# Patient Record
Sex: FEMALE | Race: WHITE | NOT HISPANIC OR LATINO
[De-identification: names, ages, dates, MRNs, and addresses within clinical notes are randomized per-mention and may not be internally consistent; named-entity substitution may affect disease eponyms.]

---

## 2017-01-04 ENCOUNTER — FORM ENCOUNTER (OUTPATIENT)
Age: 77
End: 2017-01-04

## 2017-01-05 ENCOUNTER — OUTPATIENT (OUTPATIENT)
Dept: OUTPATIENT SERVICES | Facility: HOSPITAL | Age: 77
LOS: 1 days | End: 2017-01-05
Payer: MEDICARE

## 2017-01-05 ENCOUNTER — APPOINTMENT (OUTPATIENT)
Dept: RADIOLOGY | Facility: IMAGING CENTER | Age: 77
End: 2017-01-05

## 2017-01-05 ENCOUNTER — APPOINTMENT (OUTPATIENT)
Dept: MAMMOGRAPHY | Facility: IMAGING CENTER | Age: 77
End: 2017-01-05

## 2017-01-05 DIAGNOSIS — Z12.31 ENCOUNTER FOR SCREENING MAMMOGRAM FOR MALIGNANT NEOPLASM OF BREAST: ICD-10-CM

## 2017-01-05 DIAGNOSIS — M81.0 AGE-RELATED OSTEOPOROSIS WITHOUT CURRENT PATHOLOGICAL FRACTURE: ICD-10-CM

## 2017-01-05 PROCEDURE — 77080 DXA BONE DENSITY AXIAL: CPT

## 2017-01-05 PROCEDURE — 77067 SCR MAMMO BI INCL CAD: CPT

## 2017-01-09 DIAGNOSIS — Z78.0 ASYMPTOMATIC MENOPAUSAL STATE: ICD-10-CM

## 2017-01-09 DIAGNOSIS — Z12.31 ENCOUNTER FOR SCREENING MAMMOGRAM FOR MALIGNANT NEOPLASM OF BREAST: ICD-10-CM

## 2017-01-09 DIAGNOSIS — M81.0 AGE-RELATED OSTEOPOROSIS WITHOUT CURRENT PATHOLOGICAL FRACTURE: ICD-10-CM

## 2017-01-12 ENCOUNTER — OUTPATIENT (OUTPATIENT)
Dept: OUTPATIENT SERVICES | Facility: HOSPITAL | Age: 77
LOS: 1 days | Discharge: HOME | End: 2017-01-12

## 2017-01-12 DIAGNOSIS — D69.6 THROMBOCYTOPENIA, UNSPECIFIED: ICD-10-CM

## 2017-01-16 ENCOUNTER — RESULT REVIEW (OUTPATIENT)
Age: 77
End: 2017-01-16

## 2017-01-17 ENCOUNTER — APPOINTMENT (OUTPATIENT)
Dept: HEMATOLOGY ONCOLOGY | Facility: CLINIC | Age: 77
End: 2017-01-17

## 2017-01-17 VITALS
WEIGHT: 133.38 LBS | OXYGEN SATURATION: 99 % | DIASTOLIC BLOOD PRESSURE: 78 MMHG | SYSTOLIC BLOOD PRESSURE: 120 MMHG | BODY MASS INDEX: 27.62 KG/M2 | HEART RATE: 55 BPM | RESPIRATION RATE: 16 BRPM | TEMPERATURE: 98.3 F

## 2017-01-17 LAB
BASOPHILS # BLD AUTO: 0.1 K/UL — SIGNIFICANT CHANGE UP (ref 0–0.2)
BASOPHILS NFR BLD AUTO: 0.8 % — SIGNIFICANT CHANGE UP (ref 0–2)
EOSINOPHIL # BLD AUTO: 0.2 K/UL — SIGNIFICANT CHANGE UP (ref 0–0.5)
EOSINOPHIL NFR BLD AUTO: 3 % — SIGNIFICANT CHANGE UP (ref 0–6)
HCT VFR BLD CALC: 41 % — SIGNIFICANT CHANGE UP (ref 34.5–45)
HGB BLD-MCNC: 13.8 G/DL — SIGNIFICANT CHANGE UP (ref 11.5–15.5)
LYMPHOCYTES # BLD AUTO: 2.6 K/UL — SIGNIFICANT CHANGE UP (ref 1–3.3)
LYMPHOCYTES # BLD AUTO: 37.8 % — SIGNIFICANT CHANGE UP (ref 13–44)
MCHC RBC-ENTMCNC: 30.1 PG — SIGNIFICANT CHANGE UP (ref 27–34)
MCHC RBC-ENTMCNC: 33.6 GM/DL — SIGNIFICANT CHANGE UP (ref 32–36)
MCV RBC AUTO: 89.6 FL — SIGNIFICANT CHANGE UP (ref 80–100)
MONOCYTES # BLD AUTO: 0.6 K/UL — SIGNIFICANT CHANGE UP (ref 0–0.9)
MONOCYTES NFR BLD AUTO: 8.4 % — SIGNIFICANT CHANGE UP (ref 2–14)
NEUTROPHILS # BLD AUTO: 3.4 K/UL — SIGNIFICANT CHANGE UP (ref 1.8–7.4)
NEUTROPHILS NFR BLD AUTO: 50 % — SIGNIFICANT CHANGE UP (ref 43–77)
PLATELET # BLD AUTO: 101 K/UL — LOW (ref 150–400)
RBC # BLD: 4.58 M/UL — SIGNIFICANT CHANGE UP (ref 3.8–5.2)
RBC # FLD: 11.4 % — SIGNIFICANT CHANGE UP (ref 10.3–14.5)
WBC # BLD: 6.8 K/UL — SIGNIFICANT CHANGE UP (ref 3.8–10.5)
WBC # FLD AUTO: 6.8 K/UL — SIGNIFICANT CHANGE UP (ref 3.8–10.5)

## 2017-02-07 ENCOUNTER — APPOINTMENT (OUTPATIENT)
Dept: ORTHOPEDIC SURGERY | Facility: CLINIC | Age: 77
End: 2017-02-07

## 2017-02-07 VITALS — HEIGHT: 58 IN | BODY MASS INDEX: 27.92 KG/M2 | WEIGHT: 133 LBS

## 2017-02-07 VITALS — BODY MASS INDEX: 25.52 KG/M2 | WEIGHT: 130 LBS | HEIGHT: 60 IN

## 2017-02-16 ENCOUNTER — APPOINTMENT (OUTPATIENT)
Dept: ORTHOPEDIC SURGERY | Facility: CLINIC | Age: 77
End: 2017-02-16

## 2017-03-09 ENCOUNTER — MED ADMIN CHARGE (OUTPATIENT)
Age: 77
End: 2017-03-09

## 2017-03-09 ENCOUNTER — OUTPATIENT (OUTPATIENT)
Dept: OUTPATIENT SERVICES | Facility: HOSPITAL | Age: 77
LOS: 1 days | End: 2017-03-09
Payer: MEDICARE

## 2017-03-09 ENCOUNTER — APPOINTMENT (OUTPATIENT)
Dept: INTERNAL MEDICINE | Facility: CLINIC | Age: 77
End: 2017-03-09

## 2017-03-09 VITALS
SYSTOLIC BLOOD PRESSURE: 118 MMHG | BODY MASS INDEX: 25.91 KG/M2 | OXYGEN SATURATION: 96 % | HEART RATE: 60 BPM | DIASTOLIC BLOOD PRESSURE: 60 MMHG | WEIGHT: 132 LBS | HEIGHT: 60 IN

## 2017-03-09 DIAGNOSIS — M81.0 AGE-RELATED OSTEOPOROSIS WITHOUT CURRENT PATHOLOGICAL FRACTURE: ICD-10-CM

## 2017-03-09 DIAGNOSIS — E78.5 HYPERLIPIDEMIA, UNSPECIFIED: ICD-10-CM

## 2017-03-09 DIAGNOSIS — M19.012 PRIMARY OSTEOARTHRITIS, LEFT SHOULDER: ICD-10-CM

## 2017-03-09 DIAGNOSIS — I10 ESSENTIAL (PRIMARY) HYPERTENSION: ICD-10-CM

## 2017-03-09 DIAGNOSIS — Z00.00 ENCOUNTER FOR GENERAL ADULT MEDICAL EXAMINATION WITHOUT ABNORMAL FINDINGS: ICD-10-CM

## 2017-03-09 DIAGNOSIS — B35.1 TINEA UNGUIUM: ICD-10-CM

## 2017-03-09 DIAGNOSIS — M25.561 PAIN IN RIGHT KNEE: ICD-10-CM

## 2017-03-09 DIAGNOSIS — M17.11 UNILATERAL PRIMARY OSTEOARTHRITIS, RIGHT KNEE: ICD-10-CM

## 2017-03-09 DIAGNOSIS — D69.6 THROMBOCYTOPENIA, UNSPECIFIED: ICD-10-CM

## 2017-03-09 DIAGNOSIS — K21.9 GASTRO-ESOPHAGEAL REFLUX DISEASE WITHOUT ESOPHAGITIS: ICD-10-CM

## 2017-03-09 PROCEDURE — 90732 PPSV23 VACC 2 YRS+ SUBQ/IM: CPT

## 2017-03-09 PROCEDURE — 90715 TDAP VACCINE 7 YRS/> IM: CPT

## 2017-03-09 PROCEDURE — 90472 IMMUNIZATION ADMIN EACH ADD: CPT

## 2017-03-09 PROCEDURE — G0009: CPT

## 2017-03-09 PROCEDURE — G0463: CPT

## 2017-03-09 RX ORDER — CHLORHEXIDINE GLUCONATE 4 %
1000 LIQUID (ML) TOPICAL
Refills: 0 | Status: DISCONTINUED | COMMUNITY
Start: 2017-03-09 | End: 2017-03-09

## 2017-03-25 ENCOUNTER — EMERGENCY (EMERGENCY)
Facility: HOSPITAL | Age: 77
LOS: 1 days | Discharge: HOME | End: 2017-03-25
Attending: EMERGENCY MEDICINE | Admitting: EMERGENCY MEDICINE
Payer: MEDICARE

## 2017-03-25 VITALS
OXYGEN SATURATION: 100 % | HEART RATE: 57 BPM | TEMPERATURE: 98 F | SYSTOLIC BLOOD PRESSURE: 179 MMHG | RESPIRATION RATE: 16 BRPM | DIASTOLIC BLOOD PRESSURE: 72 MMHG

## 2017-03-25 VITALS — WEIGHT: 130.07 LBS | HEIGHT: 61 IN

## 2017-03-25 DIAGNOSIS — R10.13 EPIGASTRIC PAIN: ICD-10-CM

## 2017-03-25 LAB
ALBUMIN SERPL ELPH-MCNC: 3.5 G/DL — SIGNIFICANT CHANGE UP (ref 3.3–5)
ALP SERPL-CCNC: 73 U/L — SIGNIFICANT CHANGE UP (ref 40–120)
ALT FLD-CCNC: 20 U/L RC — SIGNIFICANT CHANGE UP (ref 10–45)
ANION GAP SERPL CALC-SCNC: 10 MMOL/L — SIGNIFICANT CHANGE UP (ref 5–17)
APPEARANCE UR: CLEAR — SIGNIFICANT CHANGE UP
AST SERPL-CCNC: 23 U/L — SIGNIFICANT CHANGE UP (ref 10–40)
BASOPHILS # BLD AUTO: 0 K/UL — SIGNIFICANT CHANGE UP (ref 0–0.2)
BASOPHILS NFR BLD AUTO: 0.8 % — SIGNIFICANT CHANGE UP (ref 0–2)
BILIRUB SERPL-MCNC: 0.4 MG/DL — SIGNIFICANT CHANGE UP (ref 0.2–1.2)
BILIRUB UR-MCNC: NEGATIVE — SIGNIFICANT CHANGE UP
BUN SERPL-MCNC: 13 MG/DL — SIGNIFICANT CHANGE UP (ref 7–23)
CALCIUM SERPL-MCNC: 8.6 MG/DL — SIGNIFICANT CHANGE UP (ref 8.4–10.5)
CHLORIDE SERPL-SCNC: 104 MMOL/L — SIGNIFICANT CHANGE UP (ref 96–108)
CO2 SERPL-SCNC: 28 MMOL/L — SIGNIFICANT CHANGE UP (ref 22–31)
COLOR SPEC: COLORLESS — SIGNIFICANT CHANGE UP
CREAT SERPL-MCNC: 0.72 MG/DL — SIGNIFICANT CHANGE UP (ref 0.5–1.3)
DIFF PNL FLD: NEGATIVE — SIGNIFICANT CHANGE UP
EOSINOPHIL # BLD AUTO: 0.2 K/UL — SIGNIFICANT CHANGE UP (ref 0–0.5)
EOSINOPHIL NFR BLD AUTO: 2.7 % — SIGNIFICANT CHANGE UP (ref 0–6)
EPI CELLS # UR: SIGNIFICANT CHANGE UP /HPF
GLUCOSE SERPL-MCNC: 119 MG/DL — HIGH (ref 70–99)
GLUCOSE UR QL: NEGATIVE — SIGNIFICANT CHANGE UP
HCT VFR BLD CALC: 39.8 % — SIGNIFICANT CHANGE UP (ref 34.5–45)
HGB BLD-MCNC: 13.4 G/DL — SIGNIFICANT CHANGE UP (ref 11.5–15.5)
KETONES UR-MCNC: NEGATIVE — SIGNIFICANT CHANGE UP
LEUKOCYTE ESTERASE UR-ACNC: ABNORMAL
LIDOCAIN IGE QN: 41 U/L — SIGNIFICANT CHANGE UP (ref 7–60)
LYMPHOCYTES # BLD AUTO: 2.3 K/UL — SIGNIFICANT CHANGE UP (ref 1–3.3)
LYMPHOCYTES # BLD AUTO: 37.1 % — SIGNIFICANT CHANGE UP (ref 13–44)
MCHC RBC-ENTMCNC: 30 PG — SIGNIFICANT CHANGE UP (ref 27–34)
MCHC RBC-ENTMCNC: 33.6 GM/DL — SIGNIFICANT CHANGE UP (ref 32–36)
MCV RBC AUTO: 89.5 FL — SIGNIFICANT CHANGE UP (ref 80–100)
MONOCYTES # BLD AUTO: 0.7 K/UL — SIGNIFICANT CHANGE UP (ref 0–0.9)
MONOCYTES NFR BLD AUTO: 10.8 % — SIGNIFICANT CHANGE UP (ref 2–14)
NEUTROPHILS # BLD AUTO: 3.1 K/UL — SIGNIFICANT CHANGE UP (ref 1.8–7.4)
NEUTROPHILS NFR BLD AUTO: 48.6 % — SIGNIFICANT CHANGE UP (ref 43–77)
NITRITE UR-MCNC: NEGATIVE — SIGNIFICANT CHANGE UP
PH UR: 6 — SIGNIFICANT CHANGE UP (ref 4.8–8)
PLATELET # BLD AUTO: 125 K/UL — LOW (ref 150–400)
POTASSIUM SERPL-MCNC: 4.5 MMOL/L — SIGNIFICANT CHANGE UP (ref 3.5–5.3)
POTASSIUM SERPL-SCNC: 4.5 MMOL/L — SIGNIFICANT CHANGE UP (ref 3.5–5.3)
PROT SERPL-MCNC: 6.4 G/DL — SIGNIFICANT CHANGE UP (ref 6–8.3)
PROT UR-MCNC: NEGATIVE — SIGNIFICANT CHANGE UP
RBC # BLD: 4.45 M/UL — SIGNIFICANT CHANGE UP (ref 3.8–5.2)
RBC # FLD: 11.6 % — SIGNIFICANT CHANGE UP (ref 10.3–14.5)
RBC CASTS # UR COMP ASSIST: SIGNIFICANT CHANGE UP /HPF (ref 0–2)
SODIUM SERPL-SCNC: 142 MMOL/L — SIGNIFICANT CHANGE UP (ref 135–145)
SP GR SPEC: 1 — LOW (ref 1.01–1.02)
UROBILINOGEN FLD QL: NEGATIVE — SIGNIFICANT CHANGE UP
WBC # BLD: 6.3 K/UL — SIGNIFICANT CHANGE UP (ref 3.8–10.5)
WBC # FLD AUTO: 6.3 K/UL — SIGNIFICANT CHANGE UP (ref 3.8–10.5)
WBC UR QL: SIGNIFICANT CHANGE UP /HPF (ref 0–5)

## 2017-03-25 PROCEDURE — 93010 ELECTROCARDIOGRAM REPORT: CPT

## 2017-03-25 PROCEDURE — 83690 ASSAY OF LIPASE: CPT

## 2017-03-25 PROCEDURE — 93005 ELECTROCARDIOGRAM TRACING: CPT

## 2017-03-25 PROCEDURE — 80053 COMPREHEN METABOLIC PANEL: CPT

## 2017-03-25 PROCEDURE — 96375 TX/PRO/DX INJ NEW DRUG ADDON: CPT

## 2017-03-25 PROCEDURE — 85027 COMPLETE CBC AUTOMATED: CPT

## 2017-03-25 PROCEDURE — 99284 EMERGENCY DEPT VISIT MOD MDM: CPT | Mod: 25

## 2017-03-25 PROCEDURE — 71045 X-RAY EXAM CHEST 1 VIEW: CPT

## 2017-03-25 PROCEDURE — 81001 URINALYSIS AUTO W/SCOPE: CPT

## 2017-03-25 PROCEDURE — 99284 EMERGENCY DEPT VISIT MOD MDM: CPT | Mod: 25,GC

## 2017-03-25 PROCEDURE — 71010: CPT | Mod: 26

## 2017-03-25 PROCEDURE — 96374 THER/PROPH/DIAG INJ IV PUSH: CPT

## 2017-03-25 RX ORDER — LIDOCAINE 4 G/100G
10 CREAM TOPICAL ONCE
Qty: 0 | Refills: 0 | Status: COMPLETED | OUTPATIENT
Start: 2017-03-25 | End: 2017-03-25

## 2017-03-25 RX ORDER — FAMOTIDINE 10 MG/ML
20 INJECTION INTRAVENOUS ONCE
Qty: 0 | Refills: 0 | Status: COMPLETED | OUTPATIENT
Start: 2017-03-25 | End: 2017-03-25

## 2017-03-25 RX ORDER — ACETAMINOPHEN 500 MG
1000 TABLET ORAL ONCE
Qty: 0 | Refills: 0 | Status: COMPLETED | OUTPATIENT
Start: 2017-03-25 | End: 2017-03-25

## 2017-03-25 RX ADMIN — FAMOTIDINE 20 MILLIGRAM(S): 10 INJECTION INTRAVENOUS at 12:16

## 2017-03-25 RX ADMIN — LIDOCAINE 10 MILLILITER(S): 4 CREAM TOPICAL at 12:16

## 2017-03-25 RX ADMIN — Medication 400 MILLIGRAM(S): at 12:16

## 2017-03-25 RX ADMIN — Medication 30 MILLILITER(S): at 12:16

## 2017-03-25 NOTE — ED PROVIDER NOTE - CARE PLAN
Instructions for follow-up, activity and diet:	Take Maalox 10 to 20 mL 4 times daily (maximum: 80 mL per 24 hours) as need for stomach upset. Continue taking your famotidine as prescribed. Please follow-up with your PMD within 1-2 days following discharge. GI referral provided. Continue activity and diet as tolerated. If you becomes unable to tolerate liquids by mouth, uncontrollable pain, have new or worsening symptoms, or any other concern please return to the ED. Principal Discharge DX:	GERD (gastroesophageal reflux disease)  Instructions for follow-up, activity and diet:	Follow up with Cardiology: see referral sheet for rapid cardiology appointment. Take Maalox 10 to 20 mL 4 times daily (maximum: 80 mL per 24 hours) as need for stomach upset. Continue taking your famotidine as prescribed. Please follow-up with your PMD within 1-2 days following discharge. GI referral provided. Continue activity and diet as tolerated. If you becomes unable to tolerate liquids by mouth, uncontrollable pain, have new or worsening symptoms, or any other concern please return to the ED.

## 2017-03-25 NOTE — ED PROVIDER NOTE - PLAN OF CARE
Take Maalox 10 to 20 mL 4 times daily (maximum: 80 mL per 24 hours) as need for stomach upset. Continue taking your famotidine as prescribed. Please follow-up with your PMD within 1-2 days following discharge. GI referral provided. Continue activity and diet as tolerated. If you becomes unable to tolerate liquids by mouth, uncontrollable pain, have new or worsening symptoms, or any other concern please return to the ED. Follow up with Cardiology: see referral sheet for rapid cardiology appointment. Take Maalox 10 to 20 mL 4 times daily (maximum: 80 mL per 24 hours) as need for stomach upset. Continue taking your famotidine as prescribed. Please follow-up with your PMD within 1-2 days following discharge. GI referral provided. Continue activity and diet as tolerated. If you becomes unable to tolerate liquids by mouth, uncontrollable pain, have new or worsening symptoms, or any other concern please return to the ED.

## 2017-03-25 NOTE — ED PROVIDER NOTE - PMH
Arthritis    GERD (gastroesophageal reflux disease)    HTN (hypertension)    Hypercholesteremia    Thrombocytopenia

## 2017-03-25 NOTE — ED PROVIDER NOTE - NS ED ROS FT
No fever/chills, no change in vision, no dysphagia, no odynophagia, no chest pain, + abdominal pain see hpi, no nausea/vomiting,  no dysuria, no joint pain, no rashes, no focal neurologic complaints, no known mental health issues

## 2017-03-25 NOTE — ED PROVIDER NOTE - MEDICAL DECISION MAKING DETAILS
epigatric and esophageal burning after stopping PPI - signs and symptoms concerning for GERD - labs, gi cocktail, ekg, xr, reassess.

## 2017-03-25 NOTE — ED ADULT NURSE NOTE - OBJECTIVE STATEMENT
Pt presents to the ER A+Ox3 complaining of constant epigastric burning radiating upwards into throat and nose, nausea x4 days. Pt has hx of GERD recently changed PCP, currently goes to clinic, where they changed her Protonix prescription to Pepcid. 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

## 2017-03-25 NOTE — ED PROVIDER NOTE - PHYSICAL EXAMINATION
AAOX3, NAD, NCAT, EOMI, PERRL, MMM, Neck Supple, RRR, CTABL, ABD S/suprapubic ttp, without rebound or guarding, ND +BS, No CVAT, EXT warm, well perfused, No Edema, Distal Pulses Intact, No Rash,  MAEx4, Sensation to soft touch grossly intact, normal strength/tone.

## 2017-03-25 NOTE — ED PROVIDER NOTE - ATTENDING CONTRIBUTION TO CARE
Attending MD Stewart: I personally have seen and examined this patient.  Resident note reviewed and agree on plan of care and except where noted.  See below for details.    77F with PMH including HTN, HLD, GERD, arthritis presents to the ED with epigastric burning for 4 days.  Reports that PMD (at [address]) switched Rx from Protonix to Famotidine 4 days ago.  Reports that since then had had increased epigastric burning worse with laying down or after eating.  Reports that this AM took Protonix and reports that pain is improved since taking the Protonix.  Reports symptoms also associated with increased eructation.  Denies chest pain, shortness of breath, palpitations. Denies vomiting, diarrhea, blood in stools. Denies dysuria, hematuria, change in urinary habits including frequency, urgency. Denies fevers, chills, dizziness, weakness, change in vision. On exam, head NCAT, PERRL, FROM at neck, no tenderness to palpation or stepoffs along length of spine, lungs CTAB with good inspiratory effort, +S1S2, no m/r/g, abdomen soft with +BS, +mild tenderness to palpation over epigastrum, ND, no CVAT, moving all extremities with 5/5 strength bilateral upper and lower extremities, good and equal  strength bilaterally; Plan: pain control, GI cocktail, labs, UA, EKG, CXR, reassess Attending MD Stewart: I personally have seen and examined this patient.  Resident note reviewed and agree on plan of care and except where noted.  See below for details.    77F with PMH including HTN, HLD, GERD, arthritis presents to the ED with epigastric burning for 4 days.  Reports that PMD (at [address]) switched Rx from Protonix to Famotidine 4 days ago.  Reports that since then had had increased epigastric burning worse with laying down or after eating.  Reports that this AM took Protonix and reports that pain is improved since taking the Protonix.  Reports symptoms also associated with increased eructation.  Denies PSH, EtOH, tobacco, drugs, allergies.  Denies chest pain, shortness of breath, palpitations. Denies vomiting, diarrhea, blood in stools. Denies dysuria, hematuria, change in urinary habits including frequency, urgency. Denies fevers, chills, dizziness, weakness, change in vision. On exam, head NCAT, PERRL, FROM at neck, no tenderness to palpation or stepoffs along length of spine, lungs CTAB with good inspiratory effort, +S1S2, no m/r/g, abdomen soft with +BS, +mild tenderness to palpation over epigastrum, ND, no CVAT, moving all extremities with 5/5 strength bilateral upper and lower extremities, good and equal  strength bilaterally; Plan: pain control, GI cocktail, labs, UA, EKG, CXR, reassess

## 2017-04-13 ENCOUNTER — APPOINTMENT (OUTPATIENT)
Dept: OTOLARYNGOLOGY | Facility: CLINIC | Age: 77
End: 2017-04-13

## 2017-04-13 VITALS
WEIGHT: 132 LBS | HEIGHT: 60 IN | BODY MASS INDEX: 25.91 KG/M2 | DIASTOLIC BLOOD PRESSURE: 82 MMHG | SYSTOLIC BLOOD PRESSURE: 138 MMHG

## 2017-04-13 DIAGNOSIS — L29.9 PRURITUS, UNSPECIFIED: ICD-10-CM

## 2017-04-25 ENCOUNTER — APPOINTMENT (OUTPATIENT)
Dept: GASTROENTEROLOGY | Facility: CLINIC | Age: 77
End: 2017-04-25

## 2017-05-03 ENCOUNTER — LABORATORY RESULT (OUTPATIENT)
Age: 77
End: 2017-05-03

## 2017-05-03 ENCOUNTER — APPOINTMENT (OUTPATIENT)
Dept: RHEUMATOLOGY | Facility: CLINIC | Age: 77
End: 2017-05-03

## 2017-05-03 VITALS
BODY MASS INDEX: 26.9 KG/M2 | HEIGHT: 60 IN | DIASTOLIC BLOOD PRESSURE: 74 MMHG | SYSTOLIC BLOOD PRESSURE: 139 MMHG | HEART RATE: 62 BPM | OXYGEN SATURATION: 97 % | WEIGHT: 137 LBS

## 2017-05-08 LAB
25(OH)D3 SERPL-MCNC: 39.9 NG/ML
ALBUMIN SERPL ELPH-MCNC: 3.9 G/DL
ALP BLD-CCNC: 80 U/L
ALT SERPL-CCNC: 20 U/L
ANION GAP SERPL CALC-SCNC: 11 MMOL/L
AST SERPL-CCNC: 25 U/L
BASOPHILS # BLD AUTO: 0.05 K/UL
BASOPHILS NFR BLD AUTO: 0.9 %
BILIRUB SERPL-MCNC: 0.3 MG/DL
BUN SERPL-MCNC: 16 MG/DL
CALCIUM SERPL-MCNC: 9.3 MG/DL
CALCIUM SERPL-MCNC: 9.3 MG/DL
CHLORIDE SERPL-SCNC: 103 MMOL/L
CO2 SERPL-SCNC: 28 MMOL/L
CREAT SERPL-MCNC: 0.86 MG/DL
CRP SERPL-MCNC: 0.29 MG/DL
EOSINOPHIL # BLD AUTO: 0.15 K/UL
EOSINOPHIL NFR BLD AUTO: 2.7 %
ERYTHROCYTE [SEDIMENTATION RATE] IN BLOOD BY WESTERGREN METHOD: 10 MM/HR
GLUCOSE SERPL-MCNC: 152 MG/DL
HCT VFR BLD CALC: 38.9 %
HGB BLD-MCNC: 12.8 G/DL
IMM GRANULOCYTES NFR BLD AUTO: 0.2 %
LYMPHOCYTES # BLD AUTO: 2.57 K/UL
LYMPHOCYTES NFR BLD AUTO: 45.6 %
MAN DIFF?: NORMAL
MCHC RBC-ENTMCNC: 29.2 PG
MCHC RBC-ENTMCNC: 32.9 GM/DL
MCV RBC AUTO: 88.6 FL
MONOCYTES # BLD AUTO: 0.37 K/UL
MONOCYTES NFR BLD AUTO: 6.6 %
NEUTROPHILS # BLD AUTO: 2.48 K/UL
NEUTROPHILS NFR BLD AUTO: 44 %
PARATHYROID HORMONE INTACT: 48 PG/ML
PLATELET # BLD AUTO: 141 K/UL
POTASSIUM SERPL-SCNC: 4.2 MMOL/L
PROT SERPL-MCNC: 6.5 G/DL
RBC # BLD: 4.39 M/UL
RBC # FLD: 13 %
SODIUM SERPL-SCNC: 142 MMOL/L
TSH SERPL-ACNC: 3.8 UIU/ML
WBC # FLD AUTO: 5.63 K/UL

## 2017-05-09 ENCOUNTER — APPOINTMENT (OUTPATIENT)
Dept: GASTROENTEROLOGY | Facility: CLINIC | Age: 77
End: 2017-05-09

## 2017-05-09 ENCOUNTER — RESULT REVIEW (OUTPATIENT)
Age: 77
End: 2017-05-09

## 2017-05-10 ENCOUNTER — APPOINTMENT (OUTPATIENT)
Dept: ORTHOPEDIC SURGERY | Facility: CLINIC | Age: 77
End: 2017-05-10

## 2017-05-18 ENCOUNTER — APPOINTMENT (OUTPATIENT)
Dept: INTERNAL MEDICINE | Facility: CLINIC | Age: 77
End: 2017-05-18

## 2017-05-18 ENCOUNTER — OUTPATIENT (OUTPATIENT)
Dept: OUTPATIENT SERVICES | Facility: HOSPITAL | Age: 77
LOS: 1 days | End: 2017-05-18
Payer: MEDICARE

## 2017-05-18 VITALS
HEART RATE: 63 BPM | BODY MASS INDEX: 26.56 KG/M2 | OXYGEN SATURATION: 96 % | DIASTOLIC BLOOD PRESSURE: 70 MMHG | SYSTOLIC BLOOD PRESSURE: 125 MMHG | WEIGHT: 136 LBS

## 2017-05-18 DIAGNOSIS — K21.9 GASTRO-ESOPHAGEAL REFLUX DISEASE WITHOUT ESOPHAGITIS: ICD-10-CM

## 2017-05-18 DIAGNOSIS — B36.9 SUPERFICIAL MYCOSIS, UNSPECIFIED: ICD-10-CM

## 2017-05-18 DIAGNOSIS — I10 ESSENTIAL (PRIMARY) HYPERTENSION: ICD-10-CM

## 2017-05-18 DIAGNOSIS — M81.0 AGE-RELATED OSTEOPOROSIS WITHOUT CURRENT PATHOLOGICAL FRACTURE: ICD-10-CM

## 2017-05-18 PROCEDURE — G0463: CPT

## 2017-05-19 ENCOUNTER — OTHER (OUTPATIENT)
Age: 77
End: 2017-05-19

## 2017-05-19 RX ORDER — CLOTRIMAZOLE AND BETAMETHASONE DIPROPIONATE 10; .5 MG/G; MG/G
1-0.05 CREAM TOPICAL
Qty: 1 | Refills: 0 | Status: DISCONTINUED | COMMUNITY
Start: 2017-05-18 | End: 2017-05-19

## 2017-05-25 ENCOUNTER — APPOINTMENT (OUTPATIENT)
Dept: GASTROENTEROLOGY | Facility: CLINIC | Age: 77
End: 2017-05-25

## 2017-06-01 ENCOUNTER — MEDICATION RENEWAL (OUTPATIENT)
Age: 77
End: 2017-06-01

## 2017-07-26 ENCOUNTER — OUTPATIENT (OUTPATIENT)
Dept: OUTPATIENT SERVICES | Facility: HOSPITAL | Age: 77
LOS: 1 days | End: 2017-07-26
Payer: MEDICARE

## 2017-07-26 ENCOUNTER — APPOINTMENT (OUTPATIENT)
Dept: INTERNAL MEDICINE | Facility: CLINIC | Age: 77
End: 2017-07-26

## 2017-07-26 VITALS
DIASTOLIC BLOOD PRESSURE: 68 MMHG | HEART RATE: 66 BPM | RESPIRATION RATE: 14 BRPM | OXYGEN SATURATION: 95 % | SYSTOLIC BLOOD PRESSURE: 144 MMHG

## 2017-07-26 VITALS — WEIGHT: 130 LBS | BODY MASS INDEX: 25.39 KG/M2

## 2017-07-26 DIAGNOSIS — B35.1 TINEA UNGUIUM: ICD-10-CM

## 2017-07-26 DIAGNOSIS — D69.6 THROMBOCYTOPENIA, UNSPECIFIED: ICD-10-CM

## 2017-07-26 DIAGNOSIS — I10 ESSENTIAL (PRIMARY) HYPERTENSION: ICD-10-CM

## 2017-07-26 DIAGNOSIS — M81.0 AGE-RELATED OSTEOPOROSIS WITHOUT CURRENT PATHOLOGICAL FRACTURE: ICD-10-CM

## 2017-07-26 DIAGNOSIS — K21.9 GASTRO-ESOPHAGEAL REFLUX DISEASE WITHOUT ESOPHAGITIS: ICD-10-CM

## 2017-07-26 PROCEDURE — G0463: CPT

## 2017-07-26 RX ORDER — CLOTRIMAZOLE 10 MG/G
1 CREAM TOPICAL
Qty: 1 | Refills: 0 | Status: COMPLETED | COMMUNITY
Start: 2017-05-19 | End: 2017-07-26

## 2017-07-26 RX ORDER — BETAMETHASONE DIPROPIONATE 0.5 MG/G
0.05 CREAM TOPICAL TWICE DAILY
Qty: 1 | Refills: 0 | Status: COMPLETED | COMMUNITY
Start: 2017-05-19 | End: 2017-07-26

## 2017-08-15 ENCOUNTER — APPOINTMENT (OUTPATIENT)
Dept: ORTHOPEDIC SURGERY | Facility: CLINIC | Age: 77
End: 2017-08-15

## 2017-09-29 ENCOUNTER — APPOINTMENT (OUTPATIENT)
Dept: ENDOCRINOLOGY | Facility: CLINIC | Age: 77
End: 2017-09-29
Payer: MEDICARE

## 2017-09-29 VITALS
WEIGHT: 125 LBS | OXYGEN SATURATION: 98 % | DIASTOLIC BLOOD PRESSURE: 78 MMHG | BODY MASS INDEX: 24.54 KG/M2 | HEIGHT: 60 IN | SYSTOLIC BLOOD PRESSURE: 130 MMHG | HEART RATE: 75 BPM

## 2017-09-29 PROCEDURE — 99204 OFFICE O/P NEW MOD 45 MIN: CPT

## 2017-09-29 RX ORDER — ACETAMINOPHEN AND PHENYLEPHRINE HYDROCHLORIDE 325; 5 MG/1; MG/1
TABLET, COATED ORAL
Refills: 0 | Status: ACTIVE | COMMUNITY

## 2017-10-04 ENCOUNTER — RX RENEWAL (OUTPATIENT)
Age: 77
End: 2017-10-04

## 2017-10-04 ENCOUNTER — APPOINTMENT (OUTPATIENT)
Dept: INTERNAL MEDICINE | Facility: CLINIC | Age: 77
End: 2017-10-04

## 2017-10-04 RX ORDER — RISEDRONATE SODIUM 150 MG/1
150 TABLET, FILM COATED ORAL
Qty: 3 | Refills: 3 | Status: DISCONTINUED | COMMUNITY
Start: 2017-09-29 | End: 2017-10-04

## 2017-10-16 ENCOUNTER — OUTPATIENT (OUTPATIENT)
Dept: OUTPATIENT SERVICES | Facility: HOSPITAL | Age: 77
LOS: 1 days | End: 2017-10-16
Payer: MEDICARE

## 2017-10-16 ENCOUNTER — APPOINTMENT (OUTPATIENT)
Dept: INTERNAL MEDICINE | Facility: CLINIC | Age: 77
End: 2017-10-16
Payer: MEDICARE

## 2017-10-16 ENCOUNTER — NON-APPOINTMENT (OUTPATIENT)
Age: 77
End: 2017-10-16

## 2017-10-16 VITALS
BODY MASS INDEX: 24.54 KG/M2 | HEART RATE: 55 BPM | DIASTOLIC BLOOD PRESSURE: 64 MMHG | SYSTOLIC BLOOD PRESSURE: 118 MMHG | WEIGHT: 125 LBS | HEIGHT: 60 IN | OXYGEN SATURATION: 97 %

## 2017-10-16 DIAGNOSIS — I10 ESSENTIAL (PRIMARY) HYPERTENSION: ICD-10-CM

## 2017-10-16 DIAGNOSIS — H25.89 OTHER AGE-RELATED CATARACT: ICD-10-CM

## 2017-10-16 DIAGNOSIS — Z01.818 ENCOUNTER FOR OTHER PREPROCEDURAL EXAMINATION: ICD-10-CM

## 2017-10-16 LAB — HBA1C MFR BLD HPLC: 5.6

## 2017-10-16 PROCEDURE — G0463: CPT

## 2017-10-16 PROCEDURE — 99214 OFFICE O/P EST MOD 30 MIN: CPT | Mod: GC

## 2017-10-16 PROCEDURE — 93005 ELECTROCARDIOGRAM TRACING: CPT

## 2017-11-21 ENCOUNTER — APPOINTMENT (OUTPATIENT)
Dept: INTERNAL MEDICINE | Facility: CLINIC | Age: 77
End: 2017-11-21

## 2017-12-12 ENCOUNTER — OUTPATIENT (OUTPATIENT)
Dept: OUTPATIENT SERVICES | Facility: HOSPITAL | Age: 77
LOS: 1 days | End: 2017-12-12
Payer: MEDICARE

## 2017-12-12 ENCOUNTER — MED ADMIN CHARGE (OUTPATIENT)
Age: 77
End: 2017-12-12

## 2017-12-12 ENCOUNTER — APPOINTMENT (OUTPATIENT)
Dept: INTERNAL MEDICINE | Facility: CLINIC | Age: 77
End: 2017-12-12

## 2017-12-12 DIAGNOSIS — Z01.818 ENCOUNTER FOR OTHER PREPROCEDURAL EXAMINATION: ICD-10-CM

## 2017-12-12 DIAGNOSIS — Z23 ENCOUNTER FOR IMMUNIZATION: ICD-10-CM

## 2017-12-12 DIAGNOSIS — H25.89 OTHER AGE-RELATED CATARACT: ICD-10-CM

## 2017-12-12 PROCEDURE — 90732 PPSV23 VACC 2 YRS+ SUBQ/IM: CPT

## 2017-12-12 PROCEDURE — G0009: CPT

## 2018-01-10 ENCOUNTER — APPOINTMENT (OUTPATIENT)
Dept: ENDOCRINOLOGY | Facility: CLINIC | Age: 78
End: 2018-01-10
Payer: MEDICARE

## 2018-01-10 VITALS
WEIGHT: 129 LBS | BODY MASS INDEX: 25.32 KG/M2 | SYSTOLIC BLOOD PRESSURE: 132 MMHG | HEART RATE: 67 BPM | DIASTOLIC BLOOD PRESSURE: 64 MMHG | HEIGHT: 60 IN | OXYGEN SATURATION: 98 %

## 2018-01-10 PROCEDURE — 99214 OFFICE O/P EST MOD 30 MIN: CPT

## 2018-01-22 ENCOUNTER — OUTPATIENT (OUTPATIENT)
Dept: OUTPATIENT SERVICES | Facility: HOSPITAL | Age: 78
LOS: 1 days | End: 2018-01-22
Payer: MEDICARE

## 2018-01-22 ENCOUNTER — APPOINTMENT (OUTPATIENT)
Dept: INTERNAL MEDICINE | Facility: CLINIC | Age: 78
End: 2018-01-22
Payer: MEDICARE

## 2018-01-22 VITALS
DIASTOLIC BLOOD PRESSURE: 60 MMHG | SYSTOLIC BLOOD PRESSURE: 140 MMHG | BODY MASS INDEX: 26.79 KG/M2 | OXYGEN SATURATION: 96 % | HEART RATE: 73 BPM | TEMPERATURE: 99.9 F | WEIGHT: 126 LBS

## 2018-01-22 DIAGNOSIS — J06.9 ACUTE UPPER RESPIRATORY INFECTION, UNSPECIFIED: ICD-10-CM

## 2018-01-22 DIAGNOSIS — E78.5 HYPERLIPIDEMIA, UNSPECIFIED: ICD-10-CM

## 2018-01-22 DIAGNOSIS — R30.0 DYSURIA: ICD-10-CM

## 2018-01-22 DIAGNOSIS — I10 ESSENTIAL (PRIMARY) HYPERTENSION: ICD-10-CM

## 2018-01-22 PROCEDURE — 99214 OFFICE O/P EST MOD 30 MIN: CPT | Mod: GC

## 2018-01-22 PROCEDURE — G0463: CPT

## 2018-01-24 LAB — BACTERIA UR CULT: NORMAL

## 2018-01-29 ENCOUNTER — APPOINTMENT (OUTPATIENT)
Dept: INTERNAL MEDICINE | Facility: CLINIC | Age: 78
End: 2018-01-29
Payer: MEDICARE

## 2018-01-29 VITALS
DIASTOLIC BLOOD PRESSURE: 60 MMHG | WEIGHT: 124 LBS | OXYGEN SATURATION: 97 % | HEIGHT: 57.5 IN | SYSTOLIC BLOOD PRESSURE: 144 MMHG | BODY MASS INDEX: 26.39 KG/M2 | HEART RATE: 71 BPM

## 2018-01-29 DIAGNOSIS — R09.81 NASAL CONGESTION: ICD-10-CM

## 2018-01-29 DIAGNOSIS — J34.89 NASAL CONGESTION: ICD-10-CM

## 2018-01-29 PROCEDURE — 99213 OFFICE O/P EST LOW 20 MIN: CPT | Mod: GE

## 2018-02-25 ENCOUNTER — FORM ENCOUNTER (OUTPATIENT)
Age: 78
End: 2018-02-25

## 2018-02-26 ENCOUNTER — OUTPATIENT (OUTPATIENT)
Dept: OUTPATIENT SERVICES | Facility: HOSPITAL | Age: 78
LOS: 1 days | End: 2018-02-26
Payer: MEDICARE

## 2018-02-26 ENCOUNTER — APPOINTMENT (OUTPATIENT)
Dept: RADIOLOGY | Facility: IMAGING CENTER | Age: 78
End: 2018-02-26
Payer: MEDICARE

## 2018-02-26 ENCOUNTER — APPOINTMENT (OUTPATIENT)
Dept: MAMMOGRAPHY | Facility: IMAGING CENTER | Age: 78
End: 2018-02-26
Payer: MEDICARE

## 2018-02-26 DIAGNOSIS — R06.02 SHORTNESS OF BREATH: ICD-10-CM

## 2018-02-26 PROCEDURE — 71046 X-RAY EXAM CHEST 2 VIEWS: CPT

## 2018-02-26 PROCEDURE — 77067 SCR MAMMO BI INCL CAD: CPT

## 2018-02-26 PROCEDURE — 77063 BREAST TOMOSYNTHESIS BI: CPT

## 2018-02-26 PROCEDURE — 77063 BREAST TOMOSYNTHESIS BI: CPT | Mod: 26

## 2018-02-26 PROCEDURE — 71046 X-RAY EXAM CHEST 2 VIEWS: CPT | Mod: 26

## 2018-02-26 PROCEDURE — 77067 SCR MAMMO BI INCL CAD: CPT | Mod: 26

## 2018-02-27 ENCOUNTER — MEDICATION RENEWAL (OUTPATIENT)
Age: 78
End: 2018-02-27

## 2018-02-28 ENCOUNTER — RX RENEWAL (OUTPATIENT)
Age: 78
End: 2018-02-28

## 2018-03-01 ENCOUNTER — RX RENEWAL (OUTPATIENT)
Age: 78
End: 2018-03-01

## 2018-03-01 RX ORDER — ALENDRONATE SODIUM 70 MG/1
70 TABLET ORAL
Qty: 13 | Refills: 3 | Status: DISCONTINUED | COMMUNITY
Start: 2017-10-04 | End: 2018-03-01

## 2018-04-09 ENCOUNTER — APPOINTMENT (OUTPATIENT)
Dept: INTERNAL MEDICINE | Facility: CLINIC | Age: 78
End: 2018-04-09

## 2018-04-25 ENCOUNTER — APPOINTMENT (OUTPATIENT)
Dept: OTOLARYNGOLOGY | Facility: CLINIC | Age: 78
End: 2018-04-25
Payer: MEDICARE

## 2018-04-25 VITALS
HEIGHT: 57.5 IN | WEIGHT: 125 LBS | HEART RATE: 60 BPM | DIASTOLIC BLOOD PRESSURE: 69 MMHG | BODY MASS INDEX: 26.6 KG/M2 | SYSTOLIC BLOOD PRESSURE: 162 MMHG

## 2018-04-25 DIAGNOSIS — H90.3 SENSORINEURAL HEARING LOSS, BILATERAL: ICD-10-CM

## 2018-04-25 PROCEDURE — 99213 OFFICE O/P EST LOW 20 MIN: CPT

## 2018-04-25 PROCEDURE — 92567 TYMPANOMETRY: CPT

## 2018-04-25 PROCEDURE — 92557 COMPREHENSIVE HEARING TEST: CPT

## 2018-04-25 RX ORDER — GLUCOSAMINE SULFATE 500 MG
500 CAPSULE ORAL
Refills: 0 | Status: DISCONTINUED | COMMUNITY
Start: 2017-03-09 | End: 2018-04-25

## 2018-04-25 RX ORDER — FLUTICASONE PROPIONATE 50 UG/1
50 SPRAY, METERED NASAL
Qty: 1 | Refills: 0 | Status: DISCONTINUED | COMMUNITY
Start: 2018-01-29 | End: 2018-04-25

## 2018-04-25 RX ORDER — UBIDECARENONE/VIT E ACET 100MG-5
100 CAPSULE ORAL TWICE DAILY
Refills: 0 | Status: DISCONTINUED | COMMUNITY
Start: 2017-03-09 | End: 2018-04-25

## 2018-05-11 PROBLEM — H90.3 COCHLEAR HEARING LOSS, BILATERAL: Status: ACTIVE | Noted: 2017-04-28

## 2018-06-26 ENCOUNTER — APPOINTMENT (OUTPATIENT)
Dept: CARDIOLOGY | Facility: CLINIC | Age: 78
End: 2018-06-26
Payer: MEDICARE

## 2018-06-26 ENCOUNTER — NON-APPOINTMENT (OUTPATIENT)
Age: 78
End: 2018-06-26

## 2018-06-26 VITALS
OXYGEN SATURATION: 98 % | HEIGHT: 57.5 IN | HEART RATE: 59 BPM | DIASTOLIC BLOOD PRESSURE: 70 MMHG | SYSTOLIC BLOOD PRESSURE: 140 MMHG | BODY MASS INDEX: 26.81 KG/M2 | WEIGHT: 126 LBS

## 2018-06-26 PROCEDURE — 93000 ELECTROCARDIOGRAM COMPLETE: CPT

## 2018-06-26 PROCEDURE — 99205 OFFICE O/P NEW HI 60 MIN: CPT

## 2018-07-30 ENCOUNTER — APPOINTMENT (OUTPATIENT)
Dept: ENDOCRINOLOGY | Facility: CLINIC | Age: 78
End: 2018-07-30
Payer: MEDICARE

## 2018-07-30 VITALS
SYSTOLIC BLOOD PRESSURE: 110 MMHG | WEIGHT: 130 LBS | OXYGEN SATURATION: 99 % | HEART RATE: 60 BPM | DIASTOLIC BLOOD PRESSURE: 80 MMHG | HEIGHT: 57.5 IN | BODY MASS INDEX: 27.66 KG/M2

## 2018-07-30 PROCEDURE — 99214 OFFICE O/P EST MOD 30 MIN: CPT

## 2018-07-31 LAB
ALBUMIN SERPL ELPH-MCNC: 4 G/DL
ALP BLD-CCNC: 75 U/L
ALT SERPL-CCNC: 19 U/L
ANION GAP SERPL CALC-SCNC: 14 MMOL/L
AST SERPL-CCNC: 24 U/L
BILIRUB SERPL-MCNC: 0.3 MG/DL
BUN SERPL-MCNC: 20 MG/DL
CALCIUM SERPL-MCNC: 8.9 MG/DL
CHLORIDE SERPL-SCNC: 103 MMOL/L
CO2 SERPL-SCNC: 26 MMOL/L
CREAT SERPL-MCNC: 0.75 MG/DL
GLUCOSE SERPL-MCNC: 97 MG/DL
POTASSIUM SERPL-SCNC: 4.4 MMOL/L
PROT SERPL-MCNC: 6.5 G/DL
SODIUM SERPL-SCNC: 143 MMOL/L

## 2018-08-01 ENCOUNTER — OUTPATIENT (OUTPATIENT)
Dept: OUTPATIENT SERVICES | Facility: HOSPITAL | Age: 78
LOS: 1 days | End: 2018-08-01
Payer: MEDICARE

## 2018-08-01 ENCOUNTER — APPOINTMENT (OUTPATIENT)
Dept: INTERNAL MEDICINE | Facility: CLINIC | Age: 78
End: 2018-08-01
Payer: MEDICARE

## 2018-08-01 VITALS
HEIGHT: 57.5 IN | HEART RATE: 65 BPM | SYSTOLIC BLOOD PRESSURE: 142 MMHG | DIASTOLIC BLOOD PRESSURE: 60 MMHG | BODY MASS INDEX: 27.45 KG/M2 | WEIGHT: 129 LBS | OXYGEN SATURATION: 97 %

## 2018-08-01 VITALS — SYSTOLIC BLOOD PRESSURE: 124 MMHG | DIASTOLIC BLOOD PRESSURE: 64 MMHG

## 2018-08-01 DIAGNOSIS — I10 ESSENTIAL (PRIMARY) HYPERTENSION: ICD-10-CM

## 2018-08-01 DIAGNOSIS — K21.9 GASTRO-ESOPHAGEAL REFLUX DISEASE W/OUT ESOPHAGITIS: ICD-10-CM

## 2018-08-01 DIAGNOSIS — E78.5 HYPERLIPIDEMIA, UNSPECIFIED: ICD-10-CM

## 2018-08-01 DIAGNOSIS — K21.9 GASTRO-ESOPHAGEAL REFLUX DISEASE WITHOUT ESOPHAGITIS: ICD-10-CM

## 2018-08-01 PROCEDURE — G0463: CPT

## 2018-08-01 PROCEDURE — 99213 OFFICE O/P EST LOW 20 MIN: CPT | Mod: GE

## 2018-08-01 RX ORDER — GLUCOSAMINE SULFATE 500 MG
500 CAPSULE ORAL 3 TIMES DAILY
Qty: 90 | Refills: 0 | Status: ACTIVE | COMMUNITY
Start: 2018-08-01

## 2018-08-01 NOTE — ASSESSMENT
[FreeTextEntry1] : 77 y/o Female with PMHx of HTN, HLD, GERD, Osteoporosis, Osteoarthritis, and ITP presenting for follow up visit. \par \par #HTN:\par - BP elevated today at 142/60, repeat 124/64\par - would continue with current BP regimen of amlodipine 5 and losartan 50\par - continue to monitor BP at home \par \par #GERD:\par - stable on famotidine 20 mg daily\par - counseled on avoiding triggers \par \par #HLD:\par -c/w Rosuvastatin 10 \par \par #Osteoporosis:\par -c/w Risedronate 150 mg daily (Since October 2017)\par -c/w calcium and vitamin D \par \par #HCM\par - Mammogram 02/2018 BIRADS 2 \par - immunizations UTD \par \par RTC in 4 months for CPE \par Discussed with Dr. Simon \par

## 2018-08-01 NOTE — PHYSICAL EXAM
[No Acute Distress] : no acute distress [Well-Appearing] : well-appearing [Normal Sclera/Conjunctiva] : normal sclera/conjunctiva [PERRL] : pupils equal round and reactive to light [EOMI] : extraocular movements intact [Normal Outer Ear/Nose] : the outer ears and nose were normal in appearance [Normal Oropharynx] : the oropharynx was normal [Supple] : supple [No Lymphadenopathy] : no lymphadenopathy [No Respiratory Distress] : no respiratory distress  [Clear to Auscultation] : lungs were clear to auscultation bilaterally [Normal Rate] : normal rate  [Regular Rhythm] : with a regular rhythm [Normal S1, S2] : normal S1 and S2 [Soft] : abdomen soft [Non Tender] : non-tender [Normal Posterior Cervical Nodes] : no posterior cervical lymphadenopathy [Normal Anterior Cervical Nodes] : no anterior cervical lymphadenopathy [No Joint Swelling] : no joint swelling [No Rash] : no rash [No Focal Deficits] : no focal deficits [Normal Affect] : the affect was normal

## 2018-08-01 NOTE — HISTORY OF PRESENT ILLNESS
[FreeTextEntry1] : follow up visit [de-identified] : 79 y/o Female with PMHx of HTN, HLD, GERD, Osteoporosis, Osteoarthritis presenting for follow up visit.\par \par Patient accompanied by son, who is translating in Farsi. Patient has been doing well since last visit. Patient has been taking all of her medications. Patient checks BP at home daily. Numbers ranging from 110's-130's/50'-60's. Patient's son notices that numbers are higher in the morning, and lower in the evening. Patient denies lightheadedness/dizziness, headaches, or vision changes. Patient tries to eat low sodium diet. Patient's acid reflux is much better with famotidine, started about 6 months ago. Patient has been avoiding triggers.

## 2018-08-01 NOTE — REVIEW OF SYSTEMS
[Fever] : no fever [Night Sweats] : no night sweats [Vision Problems] : no vision problems [Earache] : no earache [Chest Pain] : no chest pain [Palpitations] : no palpitations [Shortness Of Breath] : no shortness of breath [Wheezing] : no wheezing [Cough] : no cough [Abdominal Pain] : no abdominal pain [Nausea] : no nausea [Vomiting] : no vomiting [Dysuria] : no dysuria [Joint Pain] : no joint pain [Skin Rash] : no skin rash [Dizziness] : no dizziness [Fainting] : no fainting

## 2018-09-18 ENCOUNTER — APPOINTMENT (OUTPATIENT)
Dept: ORTHOPEDIC SURGERY | Facility: CLINIC | Age: 78
End: 2018-09-18
Payer: MEDICARE

## 2018-09-18 PROCEDURE — 20610 DRAIN/INJ JOINT/BURSA W/O US: CPT | Mod: 50

## 2018-09-18 PROCEDURE — 99213 OFFICE O/P EST LOW 20 MIN: CPT | Mod: 25

## 2018-09-18 PROCEDURE — 73564 X-RAY EXAM KNEE 4 OR MORE: CPT | Mod: 50

## 2018-11-01 ENCOUNTER — MEDICATION RENEWAL (OUTPATIENT)
Age: 78
End: 2018-11-01

## 2018-11-05 ENCOUNTER — APPOINTMENT (OUTPATIENT)
Dept: ENDOCRINOLOGY | Facility: CLINIC | Age: 78
End: 2018-11-05
Payer: MEDICARE

## 2018-11-05 VITALS
DIASTOLIC BLOOD PRESSURE: 60 MMHG | HEIGHT: 57.8 IN | WEIGHT: 124 LBS | OXYGEN SATURATION: 98 % | BODY MASS INDEX: 26.03 KG/M2 | HEART RATE: 56 BPM | SYSTOLIC BLOOD PRESSURE: 110 MMHG

## 2018-11-05 VITALS — HEIGHT: 57.8 IN | BODY MASS INDEX: 26.03 KG/M2 | WEIGHT: 124 LBS

## 2018-11-05 PROCEDURE — 77080 DXA BONE DENSITY AXIAL: CPT | Mod: GA

## 2018-11-05 PROCEDURE — 99214 OFFICE O/P EST MOD 30 MIN: CPT | Mod: 25

## 2018-11-05 PROCEDURE — ZZZZZ: CPT

## 2018-11-07 ENCOUNTER — MEDICATION RENEWAL (OUTPATIENT)
Age: 78
End: 2018-11-07

## 2018-12-03 ENCOUNTER — MEDICATION RENEWAL (OUTPATIENT)
Age: 78
End: 2018-12-03

## 2018-12-13 ENCOUNTER — APPOINTMENT (OUTPATIENT)
Dept: INTERNAL MEDICINE | Facility: CLINIC | Age: 78
End: 2018-12-13
Payer: MEDICARE

## 2018-12-13 ENCOUNTER — LABORATORY RESULT (OUTPATIENT)
Age: 78
End: 2018-12-13

## 2018-12-13 ENCOUNTER — OUTPATIENT (OUTPATIENT)
Dept: OUTPATIENT SERVICES | Facility: HOSPITAL | Age: 78
LOS: 1 days | End: 2018-12-13
Payer: MEDICARE

## 2018-12-13 VITALS
HEIGHT: 57.8 IN | HEART RATE: 58 BPM | OXYGEN SATURATION: 98 % | BODY MASS INDEX: 27.29 KG/M2 | SYSTOLIC BLOOD PRESSURE: 140 MMHG | WEIGHT: 130 LBS | DIASTOLIC BLOOD PRESSURE: 70 MMHG

## 2018-12-13 DIAGNOSIS — I10 ESSENTIAL (PRIMARY) HYPERTENSION: ICD-10-CM

## 2018-12-13 DIAGNOSIS — Z23 ENCOUNTER FOR IMMUNIZATION: ICD-10-CM

## 2018-12-13 DIAGNOSIS — R35.0 FREQUENCY OF MICTURITION: ICD-10-CM

## 2018-12-13 LAB
BILIRUB UR QL STRIP: NORMAL
CLARITY UR: CLEAR
COLLECTION METHOD: NORMAL
GLUCOSE UR-MCNC: NORMAL
HCG UR QL: 0.2 EU/DL
HGB UR QL STRIP.AUTO: NORMAL
KETONES UR-MCNC: NORMAL
LEUKOCYTE ESTERASE UR QL STRIP: NORMAL
NITRITE UR QL STRIP: NORMAL
PH UR STRIP: 5.5
PROT UR STRIP-MCNC: NORMAL
SP GR UR STRIP: 1

## 2018-12-13 PROCEDURE — G0463: CPT

## 2018-12-13 PROCEDURE — 99213 OFFICE O/P EST LOW 20 MIN: CPT | Mod: GE

## 2018-12-14 PROBLEM — Z23 NEED FOR INFLUENZA VACCINATION: Status: ACTIVE | Noted: 2018-12-13

## 2018-12-14 LAB
APPEARANCE: CLEAR
BILIRUBIN URINE: NEGATIVE
BLOOD URINE: NEGATIVE
COLOR: YELLOW
GLUCOSE QUALITATIVE U: NEGATIVE MG/DL
HBA1C MFR BLD HPLC: 5.8 %
KETONES URINE: NEGATIVE
LEUKOCYTE ESTERASE URINE: NEGATIVE
NITRITE URINE: NEGATIVE
PH URINE: 6.5
PROTEIN URINE: NEGATIVE MG/DL
SPECIFIC GRAVITY URINE: 1
UROBILINOGEN URINE: NEGATIVE MG/DL

## 2018-12-14 NOTE — PHYSICAL EXAM
[No Acute Distress] : no acute distress [Well-Appearing] : well-appearing [Normal Sclera/Conjunctiva] : normal sclera/conjunctiva [Normal Outer Ear/Nose] : the outer ears and nose were normal in appearance [Normal Oropharynx] : the oropharynx was normal [Supple] : supple [No Lymphadenopathy] : no lymphadenopathy [No Respiratory Distress] : no respiratory distress  [Clear to Auscultation] : lungs were clear to auscultation bilaterally [Normal Rate] : normal rate  [Regular Rhythm] : with a regular rhythm [Normal S1, S2] : normal S1 and S2 [Soft] : abdomen soft [Non Tender] : non-tender [Well Nourished] : well nourished [Well Developed] : well developed [No Edema] : there was no peripheral edema [Normal Affect] : the affect was normal [Normal Insight/Judgement] : insight and judgment were intact

## 2018-12-18 ENCOUNTER — APPOINTMENT (OUTPATIENT)
Dept: CARDIOLOGY | Facility: CLINIC | Age: 78
End: 2018-12-18
Payer: MEDICARE

## 2018-12-18 ENCOUNTER — NON-APPOINTMENT (OUTPATIENT)
Age: 78
End: 2018-12-18

## 2018-12-18 VITALS
BODY MASS INDEX: 27.61 KG/M2 | SYSTOLIC BLOOD PRESSURE: 188 MMHG | HEART RATE: 58 BPM | WEIGHT: 128 LBS | OXYGEN SATURATION: 100 % | HEIGHT: 57 IN | DIASTOLIC BLOOD PRESSURE: 50 MMHG

## 2018-12-18 PROCEDURE — 99214 OFFICE O/P EST MOD 30 MIN: CPT

## 2018-12-18 PROCEDURE — 93000 ELECTROCARDIOGRAM COMPLETE: CPT

## 2018-12-20 NOTE — REVIEW OF SYSTEMS
[Nocturia] : nocturia [Frequency] : frequency [Fever] : no fever [Chills] : no chills [Night Sweats] : no night sweats [Vision Problems] : no vision problems [Earache] : no earache [Chest Pain] : no chest pain [Palpitations] : no palpitations [Shortness Of Breath] : no shortness of breath [Wheezing] : no wheezing [Cough] : no cough [Abdominal Pain] : no abdominal pain [Nausea] : no nausea [Vomiting] : no vomiting [Dysuria] : no dysuria [Joint Pain] : no joint pain [Skin Rash] : no skin rash [Dizziness] : no dizziness [Fainting] : no fainting

## 2018-12-20 NOTE — ASSESSMENT
[FreeTextEntry1] : 79yo F with PMH of HTN, HLD, GERD, Osteoporosis, Osteoarthritis, and ITP presenting for follow up visit with complaints of nocturia/increased frequency\par \par #Nocturia/Urinary Frequency\par -UA WNL, no suggestion of infection\par -counselled that this may be part of the aging process\par -provided Kegel exercise handout\par -Urogynecology referral provided \par \par #HTN: controlled on current regimen\par - c/w amlodipine 5 and losartan 50\par - continue to monitor BP at home \par \par #HCM\par - Mammogram 02/2018 BIRADS 2 \par - immunizations UTD -> administer Flu vaccine today\par \par RTC for CPE\par d/w Dr. Rizvi\par

## 2018-12-20 NOTE — HISTORY OF PRESENT ILLNESS
[FreeTextEntry1] : Increased Urinary Frequency [de-identified] : 77 y/o Female with PMHx of HTN, HLD, GERD, Osteoporosis, Osteoarthritis presenting for follow up visit.\par \par Patient accompanied by son, who is translating in Farsi. Patient is concerned about increased nighttime urinary frequency. This has been an ongoing issue for 1yr. She is worried about her kidneys. Previously, would awaken 2 times a night but recently has gotten up 3 times a night. She eats dinner a few hours before she goes to sleep. She drinks water right before bed when she takes her meds. She drinks green tea but no other caffeine. She reports some burning with urination. She denies any incontinence.\par \par Son has been checking BPs at least 2x daily. Her SBPs are usually 120-150 prior to medication. She takes Losartan in the AM and Amlodipine in PM. Denies HA, CP, SOB, Abd Pain. lower L/lower R

## 2018-12-27 DIAGNOSIS — R35.0 FREQUENCY OF MICTURITION: ICD-10-CM

## 2018-12-28 ENCOUNTER — RX CHANGE (OUTPATIENT)
Age: 78
End: 2018-12-28

## 2019-01-07 ENCOUNTER — CLINICAL ADVICE (OUTPATIENT)
Age: 79
End: 2019-01-07

## 2019-01-08 ENCOUNTER — APPOINTMENT (OUTPATIENT)
Dept: CARDIOLOGY | Facility: CLINIC | Age: 79
End: 2019-01-08
Payer: MEDICARE

## 2019-01-08 ENCOUNTER — LABORATORY RESULT (OUTPATIENT)
Age: 79
End: 2019-01-08

## 2019-01-08 VITALS
OXYGEN SATURATION: 96 % | HEIGHT: 57 IN | SYSTOLIC BLOOD PRESSURE: 116 MMHG | HEART RATE: 70 BPM | DIASTOLIC BLOOD PRESSURE: 72 MMHG | WEIGHT: 128 LBS | BODY MASS INDEX: 27.61 KG/M2

## 2019-01-08 PROCEDURE — 99214 OFFICE O/P EST MOD 30 MIN: CPT

## 2019-01-09 LAB
25(OH)D3 SERPL-MCNC: 46.7 NG/ML
ALBUMIN SERPL ELPH-MCNC: 3.9 G/DL
ALP BLD-CCNC: 70 U/L
ALT SERPL-CCNC: 63 U/L
ANION GAP SERPL CALC-SCNC: 16 MMOL/L
APPEARANCE: CLEAR
AST SERPL-CCNC: 45 U/L
BACTERIA: NEGATIVE
BASOPHILS # BLD AUTO: 0.04 K/UL
BASOPHILS NFR BLD AUTO: 0.6 %
BILIRUB SERPL-MCNC: 0.6 MG/DL
BILIRUBIN URINE: NEGATIVE
BLOOD URINE: ABNORMAL
BUN SERPL-MCNC: 10 MG/DL
CALCIUM SERPL-MCNC: 9 MG/DL
CHLORIDE SERPL-SCNC: 101 MMOL/L
CHOLEST SERPL-MCNC: 121 MG/DL
CHOLEST/HDLC SERPL: 3 RATIO
CO2 SERPL-SCNC: 24 MMOL/L
COLOR: YELLOW
CREAT SERPL-MCNC: 0.71 MG/DL
EOSINOPHIL # BLD AUTO: 0.07 K/UL
EOSINOPHIL NFR BLD AUTO: 1 %
FT4I SERPL CALC-MCNC: 9.7 INDEX
GLUCOSE QUALITATIVE U: NEGATIVE MG/DL
GLUCOSE SERPL-MCNC: 108 MG/DL
HBA1C MFR BLD HPLC: 6.1 %
HCT VFR BLD CALC: 38.2 %
HDLC SERPL-MCNC: 41 MG/DL
HGB BLD-MCNC: 12.2 G/DL
HYALINE CASTS: 4 /LPF
IMM GRANULOCYTES NFR BLD AUTO: 0.3 %
KETONES URINE: NEGATIVE
LDLC SERPL CALC-MCNC: 60 MG/DL
LEUKOCYTE ESTERASE URINE: ABNORMAL
LYMPHOCYTES # BLD AUTO: 2.5 K/UL
LYMPHOCYTES NFR BLD AUTO: 35.1 %
MAN DIFF?: NORMAL
MCHC RBC-ENTMCNC: 28.8 PG
MCHC RBC-ENTMCNC: 31.9 GM/DL
MCV RBC AUTO: 90.1 FL
MICROSCOPIC-UA: NORMAL
MONOCYTES # BLD AUTO: 0.64 K/UL
MONOCYTES NFR BLD AUTO: 9 %
NEUTROPHILS # BLD AUTO: 3.86 K/UL
NEUTROPHILS NFR BLD AUTO: 54 %
NITRITE URINE: NEGATIVE
PH URINE: 6.5
PLATELET # BLD AUTO: 177 K/UL
POTASSIUM SERPL-SCNC: 4.6 MMOL/L
PROT SERPL-MCNC: 6.6 G/DL
PROTEIN URINE: NEGATIVE MG/DL
RBC # BLD: 4.24 M/UL
RBC # FLD: 13.2 %
RED BLOOD CELLS URINE: 8 /HPF
SODIUM SERPL-SCNC: 141 MMOL/L
SPECIFIC GRAVITY URINE: 1.02
SQUAMOUS EPITHELIAL CELLS: 2 /HPF
T3 SERPL-MCNC: 103 NG/DL
T3RU NFR SERPL: 1.01 INDEX
T4 SERPL-MCNC: 9.8 UG/DL
TRIGL SERPL-MCNC: 101 MG/DL
TSH SERPL-ACNC: 3.02 UIU/ML
UROBILINOGEN URINE: NEGATIVE MG/DL
WBC # FLD AUTO: 7.13 K/UL
WHITE BLOOD CELLS URINE: 6 /HPF

## 2019-01-10 LAB — BACTERIA UR CULT: NORMAL

## 2019-01-17 ENCOUNTER — MEDICATION RENEWAL (OUTPATIENT)
Age: 79
End: 2019-01-17

## 2019-01-18 ENCOUNTER — APPOINTMENT (OUTPATIENT)
Dept: CARDIOLOGY | Facility: CLINIC | Age: 79
End: 2019-01-18

## 2019-01-21 LAB
APPEARANCE: CLEAR
BACTERIA UR CULT: ABNORMAL
BACTERIA: NEGATIVE
BILIRUBIN URINE: NEGATIVE
BLOOD URINE: NEGATIVE
COLOR: YELLOW
GLUCOSE QUALITATIVE U: NEGATIVE MG/DL
HYALINE CASTS: 1 /LPF
KETONES URINE: NEGATIVE
LEUKOCYTE ESTERASE URINE: NEGATIVE
MICROSCOPIC-UA: NORMAL
NITRITE URINE: NEGATIVE
PH URINE: 7.5
PROTEIN URINE: NEGATIVE MG/DL
RED BLOOD CELLS URINE: 3 /HPF
SPECIFIC GRAVITY URINE: 1.01
SQUAMOUS EPITHELIAL CELLS: 1 /HPF
UROBILINOGEN URINE: NEGATIVE MG/DL
WHITE BLOOD CELLS URINE: 1 /HPF

## 2019-01-23 ENCOUNTER — APPOINTMENT (OUTPATIENT)
Dept: ORTHOPEDIC SURGERY | Facility: CLINIC | Age: 79
End: 2019-01-23
Payer: MEDICARE

## 2019-01-23 VITALS
DIASTOLIC BLOOD PRESSURE: 71 MMHG | SYSTOLIC BLOOD PRESSURE: 159 MMHG | HEART RATE: 51 BPM | WEIGHT: 128 LBS | HEIGHT: 57 IN | BODY MASS INDEX: 27.61 KG/M2

## 2019-01-23 PROCEDURE — 99213 OFFICE O/P EST LOW 20 MIN: CPT | Mod: 25

## 2019-01-23 PROCEDURE — 20610 DRAIN/INJ JOINT/BURSA W/O US: CPT | Mod: 50

## 2019-01-23 NOTE — PHYSICAL EXAM
[FreeTextEntry2] : \par \par \par KNEE EXAMINATION\par She continues to do well from a considerable amount of time after the shot and then the pain comes back she has severe arthritis in the medial compartments of both knees the right goes from 5-95 degrees still in the left stayed the same at 0-115 degrees.  She has good medial lateral and anterior posterior stability and today she has some crepitus behind the patella and pain with motion.\par \par Does not have any major effusion but knee and no real Baker's cyst.\par \par

## 2019-01-23 NOTE — DISCUSSION/SUMMARY
[de-identified] : She does not want to consider any operation or joint replacement at this time.  She would like to stay on conservative measures and tolerated the local injections well.  Return visit in 3-6 months.  \par

## 2019-01-23 NOTE — PROCEDURE
[de-identified] : Procedure Note:\par \par Anatomic Location:  Right Knee\par \par Diagnosis:  Arthritis\par \par Procedure:  Injection of 2 cc of Marcaine 0.25% plain and Celestone 1cc (6 MG)\par \par Local Spray: Ethyl Chloride.\par \par Skin preparation with Alcohol.\par \par \par Patient has consented for the procedure.\par \par Injection  through a lateral parapatella approach.\par \par Patient tolerated the procedure well.\par \par \par Procedure Note:\par \par Anatomic Location:  Left Knee\par \par Diagnosis:  Arthritis\par \par Procedure:  Injection of 2cc of Marcaine 0.25% plain and Celestone 1cc (6 MG)\par \par Local Spray: Ethyl Chloride.\par \par Preparation of the skin with Alcohol.\par \par Skin Preparation with Alcohol\par \par Patient has consented for the procedure.\par \par Injection  through a lateral parapatella approach.\par \par Patient tolerated the procedure well.\par \par Patient instructed to call the office if any reaction, fever, chills, increased erythema or swelling.   [phone].\par \par \par \par \par Patient instructed to call the office if any reaction, fever, chills, increased erythema or swelling.   [phone].\par \par

## 2019-01-23 NOTE — HISTORY OF PRESENT ILLNESS
[Pain] : pain [Worsening] : worsening [Intermit.] : ~He/She~ states the symptoms seem to be intermittent [Walking] : worsened by walking [Acetaminophen] : relieved by acetaminophen [All Hx] : past medical, family, and social [All] : medication and allergy [All Other ROS Normal] : All other review of systems are negative except as noted [Joint Pain] : joint pain [FreeTextEntry1] : Bilateral knee pain (R>L) [FreeTextEntry2] : Pt is a 79 y/o female c/o right knee pain > left knee pain.  She has pain with walking and going up and down stairs. Pain worsened last week after she was walking for awhile.  She has relief when she lays in bed at night.  The knee valentin when she stands from sitting. Denies locking, numbness or tingling. \par She had cortisone injections in September which helped.  She presents for reevaluation.

## 2019-02-20 ENCOUNTER — MEDICATION RENEWAL (OUTPATIENT)
Age: 79
End: 2019-02-20

## 2019-03-07 ENCOUNTER — MEDICATION RENEWAL (OUTPATIENT)
Age: 79
End: 2019-03-07

## 2019-03-08 ENCOUNTER — RX RENEWAL (OUTPATIENT)
Age: 79
End: 2019-03-08

## 2019-04-02 ENCOUNTER — APPOINTMENT (OUTPATIENT)
Dept: CARDIOLOGY | Facility: CLINIC | Age: 79
End: 2019-04-02
Payer: MEDICARE

## 2019-04-02 ENCOUNTER — NON-APPOINTMENT (OUTPATIENT)
Age: 79
End: 2019-04-02

## 2019-04-02 VITALS
DIASTOLIC BLOOD PRESSURE: 70 MMHG | SYSTOLIC BLOOD PRESSURE: 140 MMHG | WEIGHT: 125 LBS | HEIGHT: 57 IN | HEART RATE: 58 BPM | BODY MASS INDEX: 26.97 KG/M2 | OXYGEN SATURATION: 95 %

## 2019-04-02 PROCEDURE — 99214 OFFICE O/P EST MOD 30 MIN: CPT

## 2019-04-02 PROCEDURE — 93000 ELECTROCARDIOGRAM COMPLETE: CPT

## 2019-05-01 ENCOUNTER — APPOINTMENT (OUTPATIENT)
Dept: OTOLARYNGOLOGY | Facility: CLINIC | Age: 79
End: 2019-05-01

## 2019-05-08 ENCOUNTER — MEDICATION RENEWAL (OUTPATIENT)
Age: 79
End: 2019-05-08

## 2019-05-08 ENCOUNTER — RX RENEWAL (OUTPATIENT)
Age: 79
End: 2019-05-08

## 2019-05-09 ENCOUNTER — RX CHANGE (OUTPATIENT)
Age: 79
End: 2019-05-09

## 2019-05-19 ENCOUNTER — RX RENEWAL (OUTPATIENT)
Age: 79
End: 2019-05-19

## 2019-05-22 ENCOUNTER — APPOINTMENT (OUTPATIENT)
Dept: OTOLARYNGOLOGY | Facility: CLINIC | Age: 79
End: 2019-05-22
Payer: MEDICARE

## 2019-05-22 VITALS
SYSTOLIC BLOOD PRESSURE: 122 MMHG | DIASTOLIC BLOOD PRESSURE: 67 MMHG | BODY MASS INDEX: 28.93 KG/M2 | HEIGHT: 55 IN | WEIGHT: 125 LBS | HEART RATE: 57 BPM

## 2019-05-22 PROCEDURE — 99214 OFFICE O/P EST MOD 30 MIN: CPT

## 2019-05-22 PROCEDURE — 92557 COMPREHENSIVE HEARING TEST: CPT

## 2019-05-22 PROCEDURE — 92567 TYMPANOMETRY: CPT

## 2019-05-23 NOTE — HISTORY OF PRESENT ILLNESS
[de-identified] : 80 y/o female here for f/u for hearing check. Pt c/o of left of ear getting numb- about 3 months, happens at times. NO other sx - lasts approximately 30 minutes - tingling - gets better with massage - also happens in left hand - told must discuss with cardiology.

## 2019-05-23 NOTE — PHYSICAL EXAM
[Midline] : trachea located in midline position [Normal] : no rashes [de-identified] :  TMs normal wax removed AD

## 2019-05-23 NOTE — REASON FOR VISIT
[Subsequent Evaluation] : a subsequent evaluation for [Family Member] : family member [Other: _____] : [unfilled] [FreeTextEntry2] : f/u for hearing check [FreeTextEntry3] : Pt offered and declined translation services. Pt prefers to have family member/friend to translate.\par

## 2019-06-05 ENCOUNTER — APPOINTMENT (OUTPATIENT)
Dept: MAMMOGRAPHY | Facility: IMAGING CENTER | Age: 79
End: 2019-06-05
Payer: MEDICARE

## 2019-06-05 ENCOUNTER — OUTPATIENT (OUTPATIENT)
Dept: OUTPATIENT SERVICES | Facility: HOSPITAL | Age: 79
LOS: 1 days | End: 2019-06-05
Payer: MEDICARE

## 2019-06-05 DIAGNOSIS — Z00.00 ENCOUNTER FOR GENERAL ADULT MEDICAL EXAMINATION WITHOUT ABNORMAL FINDINGS: ICD-10-CM

## 2019-06-05 PROCEDURE — 77063 BREAST TOMOSYNTHESIS BI: CPT

## 2019-06-05 PROCEDURE — 77063 BREAST TOMOSYNTHESIS BI: CPT | Mod: 26

## 2019-06-05 PROCEDURE — 77067 SCR MAMMO BI INCL CAD: CPT

## 2019-06-05 PROCEDURE — 77067 SCR MAMMO BI INCL CAD: CPT | Mod: 26

## 2019-06-11 ENCOUNTER — APPOINTMENT (OUTPATIENT)
Dept: OTOLARYNGOLOGY | Facility: CLINIC | Age: 79
End: 2019-06-11
Payer: MEDICARE

## 2019-06-11 PROCEDURE — 92585: CPT

## 2019-06-25 ENCOUNTER — RX RENEWAL (OUTPATIENT)
Age: 79
End: 2019-06-25

## 2019-07-18 ENCOUNTER — NON-APPOINTMENT (OUTPATIENT)
Age: 79
End: 2019-07-18

## 2019-07-18 ENCOUNTER — APPOINTMENT (OUTPATIENT)
Dept: CARDIOLOGY | Facility: CLINIC | Age: 79
End: 2019-07-18
Payer: MEDICARE

## 2019-07-18 VITALS
HEART RATE: 56 BPM | WEIGHT: 126 LBS | BODY MASS INDEX: 29.16 KG/M2 | SYSTOLIC BLOOD PRESSURE: 138 MMHG | DIASTOLIC BLOOD PRESSURE: 62 MMHG | RESPIRATION RATE: 14 BRPM | OXYGEN SATURATION: 96 % | HEIGHT: 55 IN

## 2019-07-18 DIAGNOSIS — G47.30 SLEEP APNEA, UNSPECIFIED: ICD-10-CM

## 2019-07-18 PROCEDURE — 99215 OFFICE O/P EST HI 40 MIN: CPT

## 2019-07-18 PROCEDURE — 93000 ELECTROCARDIOGRAM COMPLETE: CPT | Mod: 59

## 2019-07-18 RX ORDER — RISEDRONATE SODIUM 150 MG/1
150 TABLET, FILM COATED ORAL
Qty: 3 | Refills: 2 | Status: DISCONTINUED | COMMUNITY
Start: 2018-01-10 | End: 2019-07-18

## 2019-07-18 RX ORDER — CHOLECALCIFEROL (VITAMIN D3) 50 MCG
50 MCG TABLET ORAL
Qty: 90 | Refills: 3 | Status: ACTIVE | COMMUNITY
Start: 2017-03-09 | End: 1900-01-01

## 2019-07-19 ENCOUNTER — LABORATORY RESULT (OUTPATIENT)
Age: 79
End: 2019-07-19

## 2019-07-19 LAB
ALBUMIN SERPL ELPH-MCNC: 4.4 G/DL
ALP BLD-CCNC: 74 U/L
ALT SERPL-CCNC: 19 U/L
ANION GAP SERPL CALC-SCNC: 11 MMOL/L
AST SERPL-CCNC: 20 U/L
BASOPHILS # BLD AUTO: 0.03 K/UL
BASOPHILS NFR BLD AUTO: 0.6 %
BILIRUB SERPL-MCNC: 0.6 MG/DL
BUN SERPL-MCNC: 13 MG/DL
CALCIUM SERPL-MCNC: 9.8 MG/DL
CHLORIDE SERPL-SCNC: 103 MMOL/L
CHOLEST SERPL-MCNC: 110 MG/DL
CHOLEST/HDLC SERPL: 2 RATIO
CO2 SERPL-SCNC: 29 MMOL/L
CREAT SERPL-MCNC: 0.8 MG/DL
EOSINOPHIL # BLD AUTO: 0.13 K/UL
EOSINOPHIL NFR BLD AUTO: 2.5 %
ESTIMATED AVERAGE GLUCOSE: 114 MG/DL
FT4I SERPL CALC-MCNC: 7 INDEX
GLUCOSE SERPL-MCNC: 97 MG/DL
HBA1C MFR BLD HPLC: 5.6 %
HCT VFR BLD CALC: 41.4 %
HDLC SERPL-MCNC: 54 MG/DL
HGB BLD-MCNC: 13.3 G/DL
IMM GRANULOCYTES NFR BLD AUTO: 0.2 %
LDLC SERPL CALC-MCNC: 44 MG/DL
LYMPHOCYTES # BLD AUTO: 2.47 K/UL
LYMPHOCYTES NFR BLD AUTO: 47.2 %
MAN DIFF?: NORMAL
MCHC RBC-ENTMCNC: 30.2 PG
MCHC RBC-ENTMCNC: 32.1 GM/DL
MCV RBC AUTO: 93.9 FL
MONOCYTES # BLD AUTO: 0.38 K/UL
MONOCYTES NFR BLD AUTO: 7.3 %
NEUTROPHILS # BLD AUTO: 2.21 K/UL
NEUTROPHILS NFR BLD AUTO: 42.2 %
PLATELET # BLD AUTO: 123 K/UL
POTASSIUM SERPL-SCNC: 4.6 MMOL/L
PROT SERPL-MCNC: 6.6 G/DL
RBC # BLD: 4.41 M/UL
RBC # FLD: 12.5 %
SODIUM SERPL-SCNC: 143 MMOL/L
T3 SERPL-MCNC: 112 NG/DL
T3RU NFR SERPL: 1.1 TBI
T4 FREE SERPL-MCNC: 1.3 NG/DL
T4 SERPL-MCNC: 7.2 UG/DL
TRIGL SERPL-MCNC: 60 MG/DL
TSH SERPL-ACNC: 3.64 UIU/ML
WBC # FLD AUTO: 5.23 K/UL

## 2019-07-21 LAB — 25(OH)D3 SERPL-MCNC: 46 NG/ML

## 2019-07-31 ENCOUNTER — RX RENEWAL (OUTPATIENT)
Age: 79
End: 2019-07-31

## 2019-08-22 LAB
BASOPHILS # BLD AUTO: 0.05 K/UL
BASOPHILS NFR BLD AUTO: 0.9 %
EOSINOPHIL # BLD AUTO: 0.15 K/UL
EOSINOPHIL NFR BLD AUTO: 2.7 %
HCT VFR BLD CALC: 39.5 %
HGB BLD-MCNC: 12.6 G/DL
IMM GRANULOCYTES NFR BLD AUTO: 0 %
LYMPHOCYTES # BLD AUTO: 2.32 K/UL
LYMPHOCYTES NFR BLD AUTO: 41.1 %
MAN DIFF?: NORMAL
MCHC RBC-ENTMCNC: 30 PG
MCHC RBC-ENTMCNC: 31.9 GM/DL
MCV RBC AUTO: 94 FL
MONOCYTES # BLD AUTO: 0.43 K/UL
MONOCYTES NFR BLD AUTO: 7.6 %
NEUTROPHILS # BLD AUTO: 2.69 K/UL
NEUTROPHILS NFR BLD AUTO: 47.7 %
PLATELET # BLD AUTO: 119 K/UL
RBC # BLD: 4.2 M/UL
RBC # FLD: 12.4 %
WBC # FLD AUTO: 5.64 K/UL

## 2019-10-18 ENCOUNTER — RX CHANGE (OUTPATIENT)
Age: 79
End: 2019-10-18

## 2019-11-13 RX ORDER — IBANDRONATE SODIUM 150 MG/1
150 TABLET ORAL
Qty: 3 | Refills: 3 | Status: COMPLETED | COMMUNITY
Start: 2019-05-09 | End: 2019-11-13

## 2019-12-09 ENCOUNTER — APPOINTMENT (OUTPATIENT)
Dept: ENDOCRINOLOGY | Facility: CLINIC | Age: 79
End: 2019-12-09
Payer: MEDICARE

## 2019-12-09 VITALS
OXYGEN SATURATION: 98 % | BODY MASS INDEX: 29.16 KG/M2 | WEIGHT: 126 LBS | HEIGHT: 55 IN | HEART RATE: 53 BPM | SYSTOLIC BLOOD PRESSURE: 142 MMHG | DIASTOLIC BLOOD PRESSURE: 62 MMHG

## 2019-12-09 PROCEDURE — 99214 OFFICE O/P EST MOD 30 MIN: CPT

## 2019-12-10 NOTE — PAST MEDICAL HISTORY
[History of Hormone Replacement Treatment] : has a history of hormone replacement treatment [Menopause Age____] : age at menopause was [unfilled] [de-identified] : HRT short time

## 2019-12-10 NOTE — PHYSICAL EXAM
[No Acute Distress] : no acute distress [Well Nourished] : well nourished [Alert] : alert [Well Developed] : well developed [Normal Sclera/Conjunctiva] : normal sclera/conjunctiva [EOMI] : extra ocular movement intact [No Proptosis] : no proptosis [Normal Oropharynx] : the oropharynx was normal [Thyroid Not Enlarged] : the thyroid was not enlarged [No Thyroid Nodules] : there were no palpable thyroid nodules [No Respiratory Distress] : no respiratory distress [Normal Rate] : heart rate was normal  [Clear to Auscultation] : lungs were clear to auscultation bilaterally [No Accessory Muscle Use] : no accessory muscle use [Regular Rhythm] : with a regular rhythm [Normal S1, S2] : normal S1 and S2 [Not Tender] : non-tender [Soft] : abdomen soft [Normal Bowel Sounds] : normal bowel sounds [Anterior Cervical Nodes] : anterior cervical nodes [Not Distended] : not distended [Post Cervical Nodes] : posterior cervical nodes [No Spinal Tenderness] : no spinal tenderness [Normal] : normal and non tender [Spine Straight] : spine straight [No Stigmata of Cushings Syndrome] : no stigmata of cushings syndrome [Normal Gait] : normal gait [No Rash] : no rash [Normal Strength/Tone] : muscle strength and tone were normal [Oriented x3] : oriented to person, place, and time [No Tremors] : no tremors [Normal Reflexes] : deep tendon reflexes were 2+ and symmetric [Acanthosis Nigricans] : no acanthosis nigricans

## 2019-12-10 NOTE — END OF VISIT
[FreeTextEntry3] : I, Alessandro Perez, authored this note working as a medical scribe for Dr. Concepcion.  12/09/2019.  5:00PM. This note was authored by the medical scribe for me. I have reviewed, edited, and revised the note as needed. I am in agreement with the exam findings, imaging findings, and treatment plan.  Mtathew Concepcion MD

## 2019-12-10 NOTE — HISTORY OF PRESENT ILLNESS
[Alendronate (Fosomax)] : Alendronate [Risedronate (Actonel)] : Risedronate [Previous Fragility Fracture] : previous fragility fracture(s) [FreeTextEntry1] : f/u 79 year-old female for management of osteoporosis\par \par Pt has been told of osteoporosis for many years. She took Fosamax for several years in the distant past. She stopped medicine at the time of the left humerus fracture. She was off therapy for 5 or more years. Bone mineral density January 2017 Spine -2.3 total hip -2.4, femoral neck -2.7. Restarted Fosamax, taking correctly not tolerating well some UGI sx. No thigh pain. Last DDS few mons ago. No interval fractures. \par Pt switched to Actonel 1/2018, but later transitioned to Boniva, did not tolerate (aches and pains), switched back to Actonel. Taking correctly, tolerating well. No interval fx, no UGI sx, no thigh pain. No aches & pains. No ONJ.\par \par Had cataract surgery. PSTs normal. [Disordered Eating] : no past or present history of disordered eating [Kidney Stones] : no history of kidney stones [Family History of Osteoporosis] : no family history of osteoporosis [Taking Steroids] : no past or present history of taking steroids [Family History of Hip Fracture] : no family history of hip fracture [Hyperparathyroidism] : no hyperparathyroidism [History of Radiation Therapy] : no history of radiation therapy [History of Blood Clots] : no history of blood clots

## 2019-12-10 NOTE — REASON FOR VISIT
[Follow-Up: _____] : a [unfilled] follow-up visit [Family Member] : family member [FreeTextEntry1] : via , family

## 2019-12-10 NOTE — ASSESSMENT
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 \par \par Pt has been told of osteoporosis for many years. She took Fosamax for several years in the distant past. She stopped medicine at the time of the left humerus fracture. She was off therapy for 5 or more years.\par Bone mineral density January 2017 low. Restarted Fosamax, took correctly not tolerated well some UGI sx. No thigh pain.\par Pt switched to Actonel 1/2018, but later transitioned to Boniva, did not tolerate (aches and pains), switched back to Actonel. Taking correctly, tolerating well. No interval fx, no UGI sx, no thigh pain. No aches & pains. No ONJ. Ca 9.8, normal. Vitamin D 46.\par \par f/u in 1 year w/ repeat BMD

## 2020-01-12 ENCOUNTER — LABORATORY RESULT (OUTPATIENT)
Age: 80
End: 2020-01-12

## 2020-01-14 ENCOUNTER — NON-APPOINTMENT (OUTPATIENT)
Age: 80
End: 2020-01-14

## 2020-01-14 ENCOUNTER — APPOINTMENT (OUTPATIENT)
Dept: CARDIOLOGY | Facility: CLINIC | Age: 80
End: 2020-01-14
Payer: MEDICARE

## 2020-01-14 VITALS
BODY MASS INDEX: 28.7 KG/M2 | WEIGHT: 124 LBS | DIASTOLIC BLOOD PRESSURE: 64 MMHG | OXYGEN SATURATION: 99 % | SYSTOLIC BLOOD PRESSURE: 147 MMHG | HEART RATE: 53 BPM | HEIGHT: 55 IN

## 2020-01-14 DIAGNOSIS — B36.9 SUPERFICIAL MYCOSIS, UNSPECIFIED: ICD-10-CM

## 2020-01-14 PROCEDURE — 93000 ELECTROCARDIOGRAM COMPLETE: CPT

## 2020-01-14 PROCEDURE — 99214 OFFICE O/P EST MOD 30 MIN: CPT

## 2020-02-20 ENCOUNTER — APPOINTMENT (OUTPATIENT)
Dept: CARDIOLOGY | Facility: CLINIC | Age: 80
End: 2020-02-20
Payer: MEDICARE

## 2020-02-20 ENCOUNTER — EMERGENCY (EMERGENCY)
Facility: HOSPITAL | Age: 80
LOS: 1 days | Discharge: HOME | End: 2020-02-20
Attending: EMERGENCY MEDICINE
Payer: MEDICARE

## 2020-02-20 ENCOUNTER — NON-APPOINTMENT (OUTPATIENT)
Age: 80
End: 2020-02-20

## 2020-02-20 VITALS
SYSTOLIC BLOOD PRESSURE: 128 MMHG | OXYGEN SATURATION: 100 % | HEART RATE: 66 BPM | HEIGHT: 55 IN | BODY MASS INDEX: 27.77 KG/M2 | DIASTOLIC BLOOD PRESSURE: 60 MMHG | WEIGHT: 120 LBS

## 2020-02-20 VITALS
DIASTOLIC BLOOD PRESSURE: 56 MMHG | SYSTOLIC BLOOD PRESSURE: 135 MMHG | HEART RATE: 65 BPM | TEMPERATURE: 98 F | RESPIRATION RATE: 16 BRPM | OXYGEN SATURATION: 99 %

## 2020-02-20 VITALS
SYSTOLIC BLOOD PRESSURE: 162 MMHG | HEIGHT: 55 IN | RESPIRATION RATE: 20 BRPM | WEIGHT: 119.93 LBS | OXYGEN SATURATION: 100 % | DIASTOLIC BLOOD PRESSURE: 73 MMHG | TEMPERATURE: 98 F | HEART RATE: 83 BPM

## 2020-02-20 PROCEDURE — 73030 X-RAY EXAM OF SHOULDER: CPT | Mod: 26,RT

## 2020-02-20 PROCEDURE — 73020 X-RAY EXAM OF SHOULDER: CPT

## 2020-02-20 PROCEDURE — 76377 3D RENDER W/INTRP POSTPROCES: CPT | Mod: 26

## 2020-02-20 PROCEDURE — 70450 CT HEAD/BRAIN W/O DYE: CPT

## 2020-02-20 PROCEDURE — 70450 CT HEAD/BRAIN W/O DYE: CPT | Mod: 26

## 2020-02-20 PROCEDURE — 73060 X-RAY EXAM OF HUMERUS: CPT | Mod: 26,RT

## 2020-02-20 PROCEDURE — 73030 X-RAY EXAM OF SHOULDER: CPT

## 2020-02-20 PROCEDURE — 99284 EMERGENCY DEPT VISIT MOD MDM: CPT | Mod: 25

## 2020-02-20 PROCEDURE — 70486 CT MAXILLOFACIAL W/O DYE: CPT | Mod: 26

## 2020-02-20 PROCEDURE — 99213 OFFICE O/P EST LOW 20 MIN: CPT

## 2020-02-20 PROCEDURE — 76377 3D RENDER W/INTRP POSTPROCES: CPT

## 2020-02-20 PROCEDURE — 73060 X-RAY EXAM OF HUMERUS: CPT

## 2020-02-20 PROCEDURE — 99284 EMERGENCY DEPT VISIT MOD MDM: CPT | Mod: GC

## 2020-02-20 PROCEDURE — 70486 CT MAXILLOFACIAL W/O DYE: CPT

## 2020-02-20 PROCEDURE — 73020 X-RAY EXAM OF SHOULDER: CPT | Mod: 26,59,RT

## 2020-02-20 RX ORDER — OXYBUTYNIN CHLORIDE 5 MG/1
5 TABLET ORAL DAILY
Qty: 90 | Refills: 3 | Status: DISCONTINUED | COMMUNITY
Start: 2020-01-14 | End: 2020-02-20

## 2020-02-20 RX ORDER — NITROFURANTOIN (MONOHYDRATE/MACROCRYSTALS) 25; 75 MG/1; MG/1
100 CAPSULE ORAL
Qty: 10 | Refills: 0 | Status: DISCONTINUED | COMMUNITY
Start: 2019-01-17 | End: 2020-02-20

## 2020-02-20 RX ORDER — ACETAMINOPHEN 500 MG
650 TABLET ORAL ONCE
Refills: 0 | Status: COMPLETED | OUTPATIENT
Start: 2020-02-20 | End: 2020-02-20

## 2020-02-20 RX ADMIN — Medication 650 MILLIGRAM(S): at 12:43

## 2020-02-20 RX ADMIN — Medication 650 MILLIGRAM(S): at 13:52

## 2020-02-20 NOTE — ED PROVIDER NOTE - PHYSICAL EXAMINATION
Gen: AAOx3, non-toxic  Head: NCAT  HEENT: EOMI, oral mucosa moist, normal conjunctiva  Lung: CTAB, no respiratory distress, no wheezes/rhonchi/rales B/L, speaking in full sentences  CV: RRR, no murmurs, rubs or gallops  Abd: soft, NTND, no guarding  MSK: +TTP over R acromion and proximal humerus----distal pulses intact bilateral, skin well perfused, no sensory or motor deficits. TTP over left supraorbital region with associated bruising. midline cervical spine TTP, normal ROM of neck without pain or limitation.   Neuro: No focal sensory or motor deficits, normal CN exam   Skin: Warm, well perfused, no rash  Psych: normal affect.     ~Haseeb Flor PGY2

## 2020-02-20 NOTE — ED PROVIDER NOTE - PATIENT PORTAL LINK FT
You can access the FollowMyHealth Patient Portal offered by [facility] by registering at the following website: [email or web address]. By joining [employer]’s FollowMyHealth portal, you will also be able to view your health information using other applications (apps) compatible with our system.

## 2020-02-20 NOTE — ED PROVIDER NOTE - CLINICAL SUMMARY MEDICAL DECISION MAKING FREE TEXT BOX
HAs s/p mechanical fall with head trauma 2 days ago. No midline spinal TTP, normal ROM of neck without pain or limitation. Will assess for SDH/ICH/facial bone fractures with CT head/maxfac. R shoulder pain s/p mechanical fall today. Neurovascularly intact. Will assess for fracture with XRs. Pain control and reassess.

## 2020-02-20 NOTE — ED ADULT NURSE NOTE - NSIMPLEMENTINTERV_GEN_ALL_ED
Implemented All Fall Risk Interventions:  [place] to call system. Call bell, personal items and telephone within reach. Instruct patient to call for assistance. Room bathroom lighting operational. Non-slip footwear when patient is off stretcher. Physically safe environment: no spills, clutter or unnecessary equipment. Stretcher in lowest position, wheels locked, appropriate side rails in place. Provide visual cue, wrist band, yellow gown, etc. Monitor gait and stability. Monitor for mental status changes and reorient to person, place, and time. Review medications for side effects contributing to fall risk. Reinforce activity limits and safety measures with patient and family.

## 2020-02-20 NOTE — ED ADULT NURSE NOTE - OBJECTIVE STATEMENT
80 year old female patient presents to ED c/o R shoulder pain s/p trip and fall today. Patient reports trip and fall on Tuesday night where she hit her head and sustained bruising to L eye, denies LOC at the time. Patient states she went to PMD today because she has been having headaches since. While walking outside today, patient tripped on sidewalk and fell on to R side, hitting her shoulder. Patient denies dizziness prior to fall and denies hitting head today. Patient reporting 10/10 R shoulder pain with movement. 80 year old female patient presents to ED c/o R shoulder pain s/p trip and fall today. Patient reports trip and fall on Tuesday night where she hit her head and sustained bruising to L eye, denies LOC at the time. Patient states she went to PMD today because she has been having headaches since. While walking outside today, patient tripped on sidewalk and fell on to R side, hitting her shoulder. Patient denies dizziness prior to fall and denies hitting head today. Patient reporting 10/10 R shoulder pain with movement, denies taking pain medication PTA. Patient provided with sling, applied with improvement noted and cold pack applied to shoulder. Patient made aware of plan of care.

## 2020-02-20 NOTE — ED PROVIDER NOTE - NSFOLLOWUPINSTRUCTIONS_ED_ALL_ED_FT
Follow up with your PCP in 24-48 hours about finding on CT head.   May take Tylenol as directed on the bottle for pain control.   Return to the ER if you develop any new or worsening symptoms such as fever, chest pain, shortness of breath, numbness, weakness, abdominal pain, nausea, vomiting, or visual changes.

## 2020-02-20 NOTE — ED PROVIDER NOTE - NS ED ROS FT
ROS:  GENERAL: No fever, no chills  EYES: no change in vision  HEENT: no trouble swallowing, no trouble speaking  CARDIAC: no chest pain  PULMONARY: no cough, no shortness of breath  GI: no abdominal pain, no nausea, no vomiting, no diarrhea, no constipation  : No dysuria, no frequency, no change in appearance, or odor of urine  SKIN: no rashes  NEURO: +HA.  no weakness or numbness  MSK: +R shoulder pain     Haseeb Flor PGY2

## 2020-02-20 NOTE — ED PROVIDER NOTE - PROGRESS NOTE DETAILS
Spoke to pt's cardiologist Dr. Doe who agrees with plan to have pt follow up with him within 2 days. 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

## 2020-02-20 NOTE — ED PROVIDER NOTE - ATTENDING CONTRIBUTION TO CARE
79 y/o f with pmhx HTN, HLD, presents for right shoulder pain after falling earlier today. She initially fell on Tuesday while at home, possibly felt dizzy prior to falling.  was home but not next to her at the moment. at that time she injured her left eye. today she went to follow with her pmd and after her evaluation was walking to her son's car and tripped over a pothole and fell onto her shoulder. Neither today or on Tues did the patient experience LOC, tongue biting, incontinence of bowel or bladder.  ABCDE intact,  Gen.  NO acute distress  HEENT:  perrl eomi. left paranasal ridge with ecchymosis and ttp. no deformity. no pain with eye movement. no hinds or racoon eyes,. tm clear.   Lungs:  b/l bs  CVS: S1S2   Abd;  soft non tender no distention  Ext: no pitting edema or erythema  Neuro: aaox3 no focal deficits  MSK: strength 5/5 b/l upper and lower ext

## 2020-02-20 NOTE — ED PROVIDER NOTE - OBJECTIVE STATEMENT
80F with PMH of HTN and HLD p/w right shoulder pain s/p mechanical fall today. Pt states she also experienced a mechanical fall at home 2 days ago at which time she hit her left forehead, no LOC, did not seek medical attention until she began developing headaches last night. Went to PCP this morning for the HAs. Was told by PCP nothing to do. While walking out of the office tripped over a pothole and landed onto her right shoulder. No LOC or head trauma. Reports R shoulder pain. Denies any numbness, weakness, chest pain, SOB, abd pain, N/V/D, syncope, or AC use.

## 2020-02-26 ENCOUNTER — TRANSCRIPTION ENCOUNTER (OUTPATIENT)
Age: 80
End: 2020-02-26

## 2020-02-28 ENCOUNTER — APPOINTMENT (OUTPATIENT)
Dept: ORTHOPEDIC SURGERY | Facility: CLINIC | Age: 80
End: 2020-02-28

## 2020-03-03 ENCOUNTER — NON-APPOINTMENT (OUTPATIENT)
Age: 80
End: 2020-03-03

## 2020-03-03 ENCOUNTER — APPOINTMENT (OUTPATIENT)
Dept: CARDIOLOGY | Facility: CLINIC | Age: 80
End: 2020-03-03
Payer: MEDICARE

## 2020-03-03 VITALS
WEIGHT: 125 LBS | BODY MASS INDEX: 28.93 KG/M2 | OXYGEN SATURATION: 98 % | HEART RATE: 60 BPM | HEIGHT: 55 IN | DIASTOLIC BLOOD PRESSURE: 54 MMHG | SYSTOLIC BLOOD PRESSURE: 132 MMHG

## 2020-03-03 PROCEDURE — 99214 OFFICE O/P EST MOD 30 MIN: CPT

## 2020-04-02 PROBLEM — R09.81 NASAL CONGESTION WITH RHINORRHEA: Status: ACTIVE | Noted: 2018-01-29

## 2020-05-11 ENCOUNTER — APPOINTMENT (OUTPATIENT)
Dept: OTOLARYNGOLOGY | Facility: CLINIC | Age: 80
End: 2020-05-11

## 2020-07-10 ENCOUNTER — NON-APPOINTMENT (OUTPATIENT)
Age: 80
End: 2020-07-10

## 2020-07-10 ENCOUNTER — APPOINTMENT (OUTPATIENT)
Dept: CARDIOLOGY | Facility: CLINIC | Age: 80
End: 2020-07-10
Payer: MEDICARE

## 2020-07-10 VITALS
DIASTOLIC BLOOD PRESSURE: 70 MMHG | HEIGHT: 55 IN | WEIGHT: 115 LBS | HEART RATE: 53 BPM | TEMPERATURE: 97.6 F | OXYGEN SATURATION: 99 % | BODY MASS INDEX: 26.61 KG/M2 | SYSTOLIC BLOOD PRESSURE: 140 MMHG

## 2020-07-10 PROCEDURE — 93000 ELECTROCARDIOGRAM COMPLETE: CPT

## 2020-07-10 PROCEDURE — 36415 COLL VENOUS BLD VENIPUNCTURE: CPT

## 2020-07-10 PROCEDURE — 99214 OFFICE O/P EST MOD 30 MIN: CPT

## 2020-07-12 LAB
25(OH)D3 SERPL-MCNC: 47.5 NG/ML
ALBUMIN SERPL ELPH-MCNC: 4.3 G/DL
ALP BLD-CCNC: 63 U/L
ALT SERPL-CCNC: 22 U/L
ANION GAP SERPL CALC-SCNC: 12 MMOL/L
AST SERPL-CCNC: 25 U/L
BASOPHILS # BLD AUTO: 0.05 K/UL
BASOPHILS NFR BLD AUTO: 1.1 %
BILIRUB SERPL-MCNC: 0.6 MG/DL
BUN SERPL-MCNC: 13 MG/DL
CALCIUM SERPL-MCNC: 9.5 MG/DL
CHLORIDE SERPL-SCNC: 102 MMOL/L
CHOLEST SERPL-MCNC: 108 MG/DL
CHOLEST/HDLC SERPL: 1.9 RATIO
CO2 SERPL-SCNC: 28 MMOL/L
CREAT SERPL-MCNC: 0.76 MG/DL
EOSINOPHIL # BLD AUTO: 0.24 K/UL
EOSINOPHIL NFR BLD AUTO: 5.4 %
ESTIMATED AVERAGE GLUCOSE: 114 MG/DL
GLUCOSE SERPL-MCNC: 101 MG/DL
HBA1C MFR BLD HPLC: 5.6 %
HCT VFR BLD CALC: 40.9 %
HDLC SERPL-MCNC: 56 MG/DL
HGB BLD-MCNC: 13.2 G/DL
IMM GRANULOCYTES NFR BLD AUTO: 0.2 %
LDLC SERPL CALC-MCNC: 39 MG/DL
LYMPHOCYTES # BLD AUTO: 1.88 K/UL
LYMPHOCYTES NFR BLD AUTO: 42.6 %
MAN DIFF?: NORMAL
MCHC RBC-ENTMCNC: 30.1 PG
MCHC RBC-ENTMCNC: 32.3 GM/DL
MCV RBC AUTO: 93.2 FL
MONOCYTES # BLD AUTO: 0.34 K/UL
MONOCYTES NFR BLD AUTO: 7.7 %
NEUTROPHILS # BLD AUTO: 1.89 K/UL
NEUTROPHILS NFR BLD AUTO: 43 %
PLATELET # BLD AUTO: 123 K/UL
POTASSIUM SERPL-SCNC: 4.5 MMOL/L
PROT SERPL-MCNC: 6.4 G/DL
RBC # BLD: 4.39 M/UL
RBC # FLD: 12.5 %
SARS-COV-2 IGG SERPL IA-ACNC: 0.01 INDEX
SARS-COV-2 IGG SERPL QL IA: NEGATIVE
SODIUM SERPL-SCNC: 143 MMOL/L
T4 SERPL-MCNC: 7.5 UG/DL
TRIGL SERPL-MCNC: 64 MG/DL
TSH SERPL-ACNC: 4.84 UIU/ML
WBC # FLD AUTO: 4.41 K/UL

## 2020-07-16 ENCOUNTER — APPOINTMENT (OUTPATIENT)
Dept: ORTHOPEDIC SURGERY | Facility: CLINIC | Age: 80
End: 2020-07-16
Payer: MEDICARE

## 2020-07-16 VITALS — TEMPERATURE: 97.7 F

## 2020-07-16 PROCEDURE — 20610 DRAIN/INJ JOINT/BURSA W/O US: CPT | Mod: RT

## 2020-07-16 PROCEDURE — 99214 OFFICE O/P EST MOD 30 MIN: CPT | Mod: 25

## 2020-07-22 NOTE — PROCEDURE
[de-identified] : Injection: Right shoulder (Subacromial).\par Indication: Rotator cuff arthropathy.\par \par A discussion was had with the patient regarding this procedure and all questions were answered. All risks, benefits and alternatives were discussed. These included but were not limited to bleeding, infection, and allergic reaction. Alcohol was used to clean the skin, and betadine was used to sterilize and prep the area in the posterior aspect of the right shoulder. Ethyl chloride spray was then used as a topical anesthetic. A 21-gauge needle was used to inject 4cc of 1% lidocaine and 1cc of 40mg/ml methylprednisolone into the right subacromial space. A sterile bandage was then applied. The patient tolerated the procedure well and there were no complications.

## 2020-07-22 NOTE — PHYSICAL EXAM
[de-identified] : Oriented to time, place, person\par Mood: Normal\par Affect: Normal\par Appearance: Healthy, well appearing, no acute distress.\par Gait: Normal\par Assistive Devices: None\par \par Right shoulder exam\par \par Inspection: No malalignment, No defects, No atrophy\par Skin: No masses, No lesions\par Neck: Negative Spurlings, full ROM, no pain with ROM\par AROM: FF to 160, abduction to 80, ER to 20, IR to mid lumbar\par PROM: Same\par Painful arc ROM: None\par Tenderness: No bicipital tenderness, positive tenderness to the greater tuberosity/RTC insertion, no anterior shoulder/lesser tuberosity tenderness\par Strength: 3/5 ER, 5/5 IR in adduction, 3/5 supraspinatus testing, positive O'Briens test\par AC Joint: No ttp/pain with cross arm testing\par Biceps: Speed Negative, Yergusons Negative\par Impingement test: Positive Rfausto, Positive Neer \par Stability: Stable\par Vasc: 2+ radial pulse\par Neuro: AIN, PIN, Ulnar nerve in tact to motor\par Sensation: In tact to light touch throughout\par  [de-identified] : Images were reviewed from 2.2.20. \par \par Multiple images right shoulder show severe rotator cuff arthropathy with humeral head elevation and early acetabularization of the acromion.

## 2020-07-22 NOTE — HISTORY OF PRESENT ILLNESS
[de-identified] : 80 year old RHD female presents today with right shoulder pain since February 2020. She sustained a fall on to the shoulder in February and was taken to [facility],. X-rays taken at the hospital were negative for fx. She was unable to get any additional treatment due to the pandemic. The pain is constant and worse arm elevation. Taking Tylenol for pain.  Denies numbness or tingling. Unable to take NSAIDs due to thrombocytopenia. \par \par The patient's past medical history, past surgical history, medications and allergies were reviewed by me today with the patient and documented accordingly. In addition, the patient's family and social history, which were noncontributory to this visit, were reviewed also.

## 2020-07-22 NOTE — DISCUSSION/SUMMARY
[de-identified] : 80-year-old female with right shoulder rotator cuff arthropathy\par \par A discussion was had with the patient regarding degenerative joint disease that results from rotator cuff injury and loss of joint congruence and glenohumeral wear.  This is consistent with rotator cuff arthropathy which is characterized by the combination of rotator cuff insufficiency as well as glenohumeral cartilage destruction and superior migration of the humeral head.  Discussed that progression is common due to the loss of the compressive effects of the rotator cuff as well as progression of functional decline.  Rotator cuff arthropathy tends to limit range of motion as well as pseudoparalysis.\par \par Rotator cuff arthropathy is classified by the acromiohumeral interval; GI >6mm, GII <5mm, GIII + acetabularization of acromion, GIV GH OA with or without acetabularization, GV humeral head collapse.  \par \par Nonoperative management with activity modification, injection therapy, and physical therapy are the first-line treatments.  Physical therapy is focused on scapular and rotator cuff strengthening in an attempt to maximize function and decrease anterior superior escape.  Surgical intervention can include surgical arthroscopy with unpredictable results, or reverse total shoulder arthroplasty for patients that have pseudoparalysis/anterior superior escape with a functioning axillary nerve.\par \par Recommendation: Injection therapy as provided today, consideration for surgical intervention if symptoms persist.  PT prescription provided.\par \par Follow-up PRN

## 2020-11-09 ENCOUNTER — APPOINTMENT (OUTPATIENT)
Dept: OTOLARYNGOLOGY | Facility: CLINIC | Age: 80
End: 2020-11-09
Payer: MEDICARE

## 2020-11-09 VITALS
WEIGHT: 125 LBS | DIASTOLIC BLOOD PRESSURE: 71 MMHG | BODY MASS INDEX: 28.93 KG/M2 | HEART RATE: 60 BPM | HEIGHT: 55 IN | SYSTOLIC BLOOD PRESSURE: 156 MMHG

## 2020-11-09 PROCEDURE — 92567 TYMPANOMETRY: CPT

## 2020-11-09 PROCEDURE — 92557 COMPREHENSIVE HEARING TEST: CPT

## 2020-11-09 PROCEDURE — 99213 OFFICE O/P EST LOW 20 MIN: CPT

## 2020-11-09 NOTE — HISTORY OF PRESENT ILLNESS
[de-identified] : 80 year old female follow up bilateral hearing loss.  States only wears right hearing aid as this was all insurance would cover.  States hasn't been wearing because of itchy ears.  States when removing it at night, she has right tinnitus.  Denies feeling any change in hearing since last visit.  Patient denies otalgia, otorrhea, ear infections.  Last wore hearing aid 6 months ago - clogged sensation AS but no vertigo  - when inhales feels vertigo

## 2020-11-09 NOTE — PHYSICAL EXAM
[Midline] : trachea located in midline position [Normal] : no rashes [de-identified] :  TMs normal wax removed AD

## 2020-12-09 ENCOUNTER — APPOINTMENT (OUTPATIENT)
Dept: ENDOCRINOLOGY | Facility: CLINIC | Age: 80
End: 2020-12-09

## 2020-12-16 ENCOUNTER — RX RENEWAL (OUTPATIENT)
Age: 80
End: 2020-12-16

## 2020-12-21 ENCOUNTER — RX RENEWAL (OUTPATIENT)
Age: 80
End: 2020-12-21

## 2020-12-23 ENCOUNTER — APPOINTMENT (OUTPATIENT)
Dept: ENDOCRINOLOGY | Facility: CLINIC | Age: 80
End: 2020-12-23

## 2020-12-30 ENCOUNTER — APPOINTMENT (OUTPATIENT)
Dept: ENDOCRINOLOGY | Facility: CLINIC | Age: 80
End: 2020-12-30
Payer: MEDICARE

## 2020-12-30 VITALS — HEIGHT: 57.6 IN | BODY MASS INDEX: 22.98 KG/M2 | TEMPERATURE: 98.9 F | WEIGHT: 108 LBS

## 2020-12-30 PROCEDURE — 77080 DXA BONE DENSITY AXIAL: CPT | Mod: GA

## 2021-01-08 ENCOUNTER — NON-APPOINTMENT (OUTPATIENT)
Age: 81
End: 2021-01-08

## 2021-01-08 ENCOUNTER — APPOINTMENT (OUTPATIENT)
Dept: CARDIOLOGY | Facility: CLINIC | Age: 81
End: 2021-01-08
Payer: MEDICARE

## 2021-01-08 VITALS
WEIGHT: 107 LBS | DIASTOLIC BLOOD PRESSURE: 60 MMHG | OXYGEN SATURATION: 100 % | SYSTOLIC BLOOD PRESSURE: 130 MMHG | BODY MASS INDEX: 22.68 KG/M2 | TEMPERATURE: 98.2 F | HEART RATE: 60 BPM

## 2021-01-08 DIAGNOSIS — D69.6 THROMBOCYTOPENIA, UNSPECIFIED: ICD-10-CM

## 2021-01-08 DIAGNOSIS — R14.0 ABDOMINAL DISTENSION (GASEOUS): ICD-10-CM

## 2021-01-08 DIAGNOSIS — G56.22 LESION OF ULNAR NERVE, LEFT UPPER LIMB: ICD-10-CM

## 2021-01-08 PROCEDURE — 36415 COLL VENOUS BLD VENIPUNCTURE: CPT

## 2021-01-08 PROCEDURE — 93000 ELECTROCARDIOGRAM COMPLETE: CPT

## 2021-01-08 PROCEDURE — 99214 OFFICE O/P EST MOD 30 MIN: CPT

## 2021-01-09 LAB
25(OH)D3 SERPL-MCNC: 51.8 NG/ML
ALBUMIN SERPL ELPH-MCNC: 4.2 G/DL
ALP BLD-CCNC: 74 U/L
ALT SERPL-CCNC: 18 U/L
ANION GAP SERPL CALC-SCNC: 11 MMOL/L
AST SERPL-CCNC: 22 U/L
BASOPHILS # BLD AUTO: 0.03 K/UL
BASOPHILS NFR BLD AUTO: 0.6 %
BILIRUB SERPL-MCNC: 0.6 MG/DL
BUN SERPL-MCNC: 19 MG/DL
CALCIUM SERPL-MCNC: 9.5 MG/DL
CHLORIDE SERPL-SCNC: 102 MMOL/L
CHOLEST SERPL-MCNC: 115 MG/DL
CO2 SERPL-SCNC: 29 MMOL/L
CREAT SERPL-MCNC: 0.87 MG/DL
EOSINOPHIL # BLD AUTO: 0.15 K/UL
EOSINOPHIL NFR BLD AUTO: 2.8 %
ESTIMATED AVERAGE GLUCOSE: 105 MG/DL
GLUCOSE SERPL-MCNC: 97 MG/DL
HBA1C MFR BLD HPLC: 5.3 %
HCT VFR BLD CALC: 41.7 %
HDLC SERPL-MCNC: 62 MG/DL
HGB BLD-MCNC: 13.2 G/DL
IMM GRANULOCYTES NFR BLD AUTO: 0.2 %
LDLC SERPL CALC-MCNC: 44 MG/DL
LYMPHOCYTES # BLD AUTO: 1.84 K/UL
LYMPHOCYTES NFR BLD AUTO: 34.9 %
MAN DIFF?: NORMAL
MCHC RBC-ENTMCNC: 30.3 PG
MCHC RBC-ENTMCNC: 31.7 GM/DL
MCV RBC AUTO: 95.6 FL
MONOCYTES # BLD AUTO: 0.43 K/UL
MONOCYTES NFR BLD AUTO: 8.2 %
NEUTROPHILS # BLD AUTO: 2.81 K/UL
NEUTROPHILS NFR BLD AUTO: 53.3 %
NONHDLC SERPL-MCNC: 54 MG/DL
PLATELET # BLD AUTO: 122 K/UL
POTASSIUM SERPL-SCNC: 4.6 MMOL/L
PROT SERPL-MCNC: 6.5 G/DL
RBC # BLD: 4.36 M/UL
RBC # FLD: 12.4 %
SODIUM SERPL-SCNC: 141 MMOL/L
T4 SERPL-MCNC: 8.1 UG/DL
TRIGL SERPL-MCNC: 50 MG/DL
TSH SERPL-ACNC: 3.1 UIU/ML
WBC # FLD AUTO: 5.27 K/UL

## 2021-01-13 ENCOUNTER — APPOINTMENT (OUTPATIENT)
Dept: ENDOCRINOLOGY | Facility: CLINIC | Age: 81
End: 2021-01-13
Payer: MEDICARE

## 2021-01-13 VITALS
DIASTOLIC BLOOD PRESSURE: 62 MMHG | WEIGHT: 108 LBS | BODY MASS INDEX: 23.3 KG/M2 | HEIGHT: 57 IN | OXYGEN SATURATION: 98 % | TEMPERATURE: 98.3 F | HEART RATE: 62 BPM | SYSTOLIC BLOOD PRESSURE: 160 MMHG

## 2021-01-13 PROCEDURE — 96372 THER/PROPH/DIAG INJ SC/IM: CPT

## 2021-01-13 PROCEDURE — 99214 OFFICE O/P EST MOD 30 MIN: CPT | Mod: 25

## 2021-01-13 RX ORDER — DENOSUMAB 60 MG/ML
60 INJECTION SUBCUTANEOUS
Qty: 1 | Refills: 0 | Status: COMPLETED | OUTPATIENT
Start: 2021-01-13

## 2021-01-13 RX ADMIN — DENOSUMAB 60 MG/ML: 60 INJECTION SUBCUTANEOUS at 00:00

## 2021-01-15 NOTE — PROCEDURE
[FreeTextEntry1] : Bone Densitometry Report\par BMD  December 30, 2020\par Indication: Compared to 2018\par Lumbar spine 1-4  BMD: 0.736 g/cm2  T score -2.8  WHO Classification osteoporosis -3.2%\par Total hip   BMD: 0.563 g/cm2  T score -3.1  WHO Classification osteoporosis -10.2%\par   Femoral neck  BMD: 0.481 g/cm2  T score -3.3  WHO Classification -12.4%   \par Proximal wrist  BMD: 0.619 g/cm2  T score -1.3  WHO Classification osteopenia no significant change  \par \par

## 2021-01-15 NOTE — ASSESSMENT
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 \par \par Pt has been told of osteoporosis for many years. She took Fosamax for several years in the distant past. She stopped medicine at the time of the left humerus fracture. She was off therapy for 5 or more years.\par Bone mineral density January 2017 low. Restarted Fosamax, took correctly not tolerated well some UGI sx. No thigh pain.\par Pt switched to Actonel 1/2018, but later transitioned to Boniva, did not tolerate (aches and pains), switched back to Actonel. Taking correctly, tolerating well. No interval fx, no UGI sx, no thigh pain. No aches & pains. No ONJ. Ca 9.8, normal. Vitamin D 46.\par \par  repeat BMD 12/2020,markedly decreased despite Rx.\par Options of medical therapy for osteoporosis were again reviewed in great detail. The patient was advised that she is at increased risk for future fracture. Major options are to continue anti-resorptive therapy versus change to anabolic therapy such as Tymlos, Forteo, or Evenity.  Risks benefits and costs of each category were discussed in detail. \par \par Prolia buy and bill

## 2021-01-15 NOTE — PHYSICAL EXAM
[Alert] : alert [Well Nourished] : well nourished [No Acute Distress] : no acute distress [Well Developed] : well developed [Normal Sclera/Conjunctiva] : normal sclera/conjunctiva [EOMI] : extra ocular movement intact [No Proptosis] : no proptosis [Thyroid Not Enlarged] : the thyroid was not enlarged [No Thyroid Nodules] : no palpable thyroid nodules [Clear to Auscultation] : lungs were clear to auscultation bilaterally [Normal S1, S2] : normal S1 and S2 [Normal Rate] : heart rate was normal [Regular Rhythm] : with a regular rhythm [No Edema] : no peripheral edema [Normal Bowel Sounds] : normal bowel sounds [Not Tender] : non-tender [Not Distended] : not distended [Soft] : abdomen soft [Normal Anterior Cervical Nodes] : no anterior cervical lymphadenopathy [Normal Posterior Cervical Nodes] : no posterior cervical lymphadenopathy [No Spinal Tenderness] : no spinal tenderness [Spine Straight] : spine straight [No Stigmata of Cushings Syndrome] : no stigmata of Cushings Syndrome [Normal Gait] : normal gait [No Rash] : no rash [Acanthosis Nigricans] : no acanthosis nigricans [Normal Reflexes] : deep tendon reflexes were 2+ and symmetric [No Tremors] : no tremors [Oriented x3] : oriented to person, place, and time

## 2021-01-15 NOTE — HISTORY OF PRESENT ILLNESS
[Alendronate (Fosomax)] : Alendronate [Risedronate (Actonel)] : Risedronate [Previous Fragility Fracture] : previous fragility fracture(s) [FreeTextEntry1] :  \par \par Pt has been told of osteoporosis for many years. She took Fosamax for several years in the distant past. She stopped medicine at the time of the left humerus fracture. She was off therapy for 5 or more years. Bone mineral density January 2017 Spine -2.3 total hip -2.4, femoral neck -2.7. Restarted Fosamax, taking correctly not tolerating well some UGI sx. No thigh pain. Last DDS few mons ago. No interval fractures. \par Pt switched to Actonel 1/2018, but later transitioned to Boniva, did not tolerate (aches and pains), switched back to Actonel. Taking correctly, tolerating well. No interval fx, no UGI sx, no thigh pain. No aches & pains. No ONJ.\par \par Had cataract surgery. PSTs normal. [Disordered Eating] : no past or present history of disordered eating [Kidney Stones] : no history of kidney stones [Taking Steroids] : no past or present history of taking steroids [Family History of Osteoporosis] : no family history of osteoporosis [Family History of Hip Fracture] : no family history of hip fracture [Hyperparathyroidism] : no hyperparathyroidism [History of Radiation Therapy] : no history of radiation therapy [History of Blood Clots] : no history of blood clots

## 2021-03-05 ENCOUNTER — RX RENEWAL (OUTPATIENT)
Age: 81
End: 2021-03-05

## 2021-06-28 ENCOUNTER — RX RENEWAL (OUTPATIENT)
Age: 81
End: 2021-06-28

## 2021-07-07 ENCOUNTER — RX RENEWAL (OUTPATIENT)
Age: 81
End: 2021-07-07

## 2021-07-09 ENCOUNTER — APPOINTMENT (OUTPATIENT)
Dept: CARDIOLOGY | Facility: CLINIC | Age: 81
End: 2021-07-09
Payer: MEDICARE

## 2021-07-09 ENCOUNTER — LABORATORY RESULT (OUTPATIENT)
Age: 81
End: 2021-07-09

## 2021-07-09 ENCOUNTER — NON-APPOINTMENT (OUTPATIENT)
Age: 81
End: 2021-07-09

## 2021-07-09 VITALS
HEIGHT: 57 IN | HEART RATE: 61 BPM | DIASTOLIC BLOOD PRESSURE: 78 MMHG | BODY MASS INDEX: 23.73 KG/M2 | SYSTOLIC BLOOD PRESSURE: 134 MMHG | OXYGEN SATURATION: 99 % | WEIGHT: 110 LBS

## 2021-07-09 LAB
COVID-19 SPIKE DOMAIN ANTIBODY INTERPRETATION: POSITIVE
SARS-COV-2 AB SERPL IA-ACNC: >250 U/ML

## 2021-07-09 PROCEDURE — 93000 ELECTROCARDIOGRAM COMPLETE: CPT

## 2021-07-09 PROCEDURE — 99214 OFFICE O/P EST MOD 30 MIN: CPT

## 2021-07-09 PROCEDURE — 86580 TB INTRADERMAL TEST: CPT

## 2021-07-09 PROCEDURE — 36415 COLL VENOUS BLD VENIPUNCTURE: CPT

## 2021-07-09 RX ORDER — VALSARTAN AND HYDROCHLOROTHIAZIDE 160; 25 MG/1; MG/1
160-25 TABLET, FILM COATED ORAL
Qty: 90 | Refills: 1 | Status: DISCONTINUED | COMMUNITY
Start: 2021-03-05 | End: 2021-07-09

## 2021-07-11 LAB
25(OH)D3 SERPL-MCNC: 50.5 NG/ML
ALBUMIN SERPL ELPH-MCNC: 4.1 G/DL
ALP BLD-CCNC: 65 U/L
ALT SERPL-CCNC: 18 U/L
ANION GAP SERPL CALC-SCNC: 10 MMOL/L
AST SERPL-CCNC: 21 U/L
BASOPHILS # BLD AUTO: 0.05 K/UL
BASOPHILS NFR BLD AUTO: 1 %
BILIRUB SERPL-MCNC: 0.5 MG/DL
BUN SERPL-MCNC: 19 MG/DL
CALCIUM SERPL-MCNC: 9.4 MG/DL
CHLORIDE SERPL-SCNC: 98 MMOL/L
CHOLEST SERPL-MCNC: 126 MG/DL
CO2 SERPL-SCNC: 28 MMOL/L
CREAT SERPL-MCNC: 0.77 MG/DL
EOSINOPHIL # BLD AUTO: 0.2 K/UL
EOSINOPHIL NFR BLD AUTO: 3.9 %
ESTIMATED AVERAGE GLUCOSE: 108 MG/DL
GLUCOSE SERPL-MCNC: 102 MG/DL
HBA1C MFR BLD HPLC: 5.4 %
HCT VFR BLD CALC: 36.7 %
HDLC SERPL-MCNC: 57 MG/DL
HGB BLD-MCNC: 12.2 G/DL
IMM GRANULOCYTES NFR BLD AUTO: 0.2 %
LDLC SERPL CALC-MCNC: 50 MG/DL
LYMPHOCYTES # BLD AUTO: 1.88 K/UL
LYMPHOCYTES NFR BLD AUTO: 36.2 %
MAN DIFF?: NORMAL
MCHC RBC-ENTMCNC: 31.1 PG
MCHC RBC-ENTMCNC: 33.2 GM/DL
MCV RBC AUTO: 93.6 FL
MONOCYTES # BLD AUTO: 0.45 K/UL
MONOCYTES NFR BLD AUTO: 8.7 %
NEUTROPHILS # BLD AUTO: 2.6 K/UL
NEUTROPHILS NFR BLD AUTO: 50 %
NONHDLC SERPL-MCNC: 69 MG/DL
PLATELET # BLD AUTO: 127 K/UL
POTASSIUM SERPL-SCNC: 4.2 MMOL/L
PROT SERPL-MCNC: 6.4 G/DL
RBC # BLD: 3.92 M/UL
RBC # FLD: 12.2 %
SODIUM SERPL-SCNC: 136 MMOL/L
T4 SERPL-MCNC: 6.8 UG/DL
TRIGL SERPL-MCNC: 97 MG/DL
TSH SERPL-ACNC: 3.32 UIU/ML
WBC # FLD AUTO: 5.19 K/UL

## 2021-07-14 ENCOUNTER — APPOINTMENT (OUTPATIENT)
Dept: CARDIOLOGY | Facility: CLINIC | Age: 81
End: 2021-07-14
Payer: MEDICARE

## 2021-07-14 PROCEDURE — 36415 COLL VENOUS BLD VENIPUNCTURE: CPT

## 2021-07-18 LAB
M TB IFN-G BLD-IMP: NEGATIVE
QUANTIFERON TB PLUS MITOGEN MINUS NIL: 9.16 IU/ML
QUANTIFERON TB PLUS NIL: 0.03 IU/ML
QUANTIFERON TB PLUS TB1 MINUS NIL: 0.09 IU/ML
QUANTIFERON TB PLUS TB2 MINUS NIL: 0.07 IU/ML

## 2021-07-20 ENCOUNTER — APPOINTMENT (OUTPATIENT)
Dept: ENDOCRINOLOGY | Facility: CLINIC | Age: 81
End: 2021-07-20
Payer: MEDICARE

## 2021-07-20 VITALS
HEART RATE: 54 BPM | BODY MASS INDEX: 23.3 KG/M2 | OXYGEN SATURATION: 98 % | TEMPERATURE: 98.1 F | HEIGHT: 57 IN | WEIGHT: 108 LBS | SYSTOLIC BLOOD PRESSURE: 134 MMHG | DIASTOLIC BLOOD PRESSURE: 62 MMHG

## 2021-07-20 PROCEDURE — 96372 THER/PROPH/DIAG INJ SC/IM: CPT

## 2021-07-20 PROCEDURE — 99213 OFFICE O/P EST LOW 20 MIN: CPT | Mod: 25

## 2021-07-20 RX ORDER — RISEDRONATE SODIUM 150 MG/1
150 TABLET, FILM COATED ORAL
Qty: 3 | Refills: 3 | Status: DISCONTINUED | COMMUNITY
Start: 2019-10-18 | End: 2021-07-20

## 2021-07-20 RX ORDER — DENOSUMAB 60 MG/ML
60 INJECTION SUBCUTANEOUS
Qty: 1 | Refills: 0 | Status: COMPLETED | OUTPATIENT
Start: 2021-07-20

## 2021-07-20 RX ADMIN — DENOSUMAB 60 MG/ML: 60 INJECTION SUBCUTANEOUS at 00:00

## 2021-07-20 NOTE — ASSESSMENT
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 \par \par Pt has been told of osteoporosis for many years. She took Fosamax for several years in the distant past. She stopped medicine at the time of the left humerus fracture. She was off therapy for 5 or more years.\par Bone mineral density January 2017 low. Restarted Fosamax, took correctly not tolerated well some UGI sx. No thigh pain. Pt switched to Actonel 1/2018, but later transitioned to Boniva, did not tolerate (aches and pains), switched back to Actonel. Took correctly, tolerated well. BMD 12/2020 markedly decreased despite Rx. Options of medical therapy for osteoporosis were again reviewed in great detail. Pt started Prolia 1/2021. Tolerating well. No thigh pain, no interval fx. No ONJ. Continue Prolia, buy and bill.\par \par Pt may need future dental extraction. Discussed ADA guidelines regarding osteoporosis rx, ONJ, and oral surgery.  Data to include suggestion of the resumption of osteoporosis rx after ONJ. Norma NB, Sadie YANGT, Candido N, José Antonio S, Ju PW, Lauren X, Rakel A, Terry RB, Kiley M. Invasive Oral Procedures and Events in Postmenopausal Women With Osteoporosis Treated With Denosumab for Up to 10 Years. J Clin Endocrinol Metab. 2019 Jun 1;104(6):5738-0420. Pt can have work done towards the end of a 6 month cycle and then delay the next dose of Prolia by 1 month. All questions answered. Any further questions DDS can contact me.\par \par Labs 7/2021 reviewed: Ca 9.4, normal. Vitamin D 50.5, normal. TSH 3.32, normal. Creatinine 0.77, normal.\par \par F/u in 6 months w/ BMD

## 2021-07-20 NOTE — HISTORY OF PRESENT ILLNESS
[Alendronate (Fosomax)] : Alendronate [Risedronate (Actonel)] : Risedronate [Denosumab (Prolia)] : Denosumab [FreeTextEntry1] : No significant interval health changes. No interval surgery, hospitalizations, fractures, or change in medications.\par \par Pt has been told of osteoporosis for many years. She took Fosamax for several years in the distant past. She stopped medicine at the time of the left humerus fracture. She was off therapy for 5 or more years. BMD January 2017 Spine -2.3 total hip -2.4, femoral neck -2.7. Restarted Fosamax, taking correctly not tolerating well some UGI sx. No thigh pain. Last DDS few mons ago. No interval fractures. Pt switched to Actonel 1/2018, but later transitioned to Boniva, did not tolerate (aches and pains), switched back to Actonel. Took correctly, tolerated well. BMD 12/2020 markedly decreased despite Rx. Pt started Prolia 1/2021. Tolerating well. No thigh pain, no interval fx. Last DDS over 1 year ago. No ONJ.\par \par Had cataract surgery. PSTs normal.

## 2021-07-20 NOTE — PHYSICAL EXAM
[Alert] : alert [Well Nourished] : well nourished [No Acute Distress] : no acute distress [Well Developed] : well developed [Normal Sclera/Conjunctiva] : normal sclera/conjunctiva [EOMI] : extra ocular movement intact [No Proptosis] : no proptosis [Thyroid Not Enlarged] : the thyroid was not enlarged [No Thyroid Nodules] : no palpable thyroid nodules [Clear to Auscultation] : lungs were clear to auscultation bilaterally [Normal S1, S2] : normal S1 and S2 [Normal Rate] : heart rate was normal [Regular Rhythm] : with a regular rhythm [No Edema] : no peripheral edema [Normal Bowel Sounds] : normal bowel sounds [Not Tender] : non-tender [Not Distended] : not distended [Soft] : abdomen soft [Normal Anterior Cervical Nodes] : no anterior cervical lymphadenopathy [No Spinal Tenderness] : no spinal tenderness [Spine Straight] : spine straight [No Stigmata of Cushings Syndrome] : no stigmata of Cushings Syndrome [Normal Gait] : normal gait [Normal Reflexes] : deep tendon reflexes were 2+ and symmetric [No Tremors] : no tremors [Oriented x3] : oriented to person, place, and time [de-identified] : 2/6 systolic murmur

## 2021-07-20 NOTE — END OF VISIT
[FreeTextEntry3] : I, Alessandro Perez, authored this note working as a medical scribe for Dr. Concepcion.  07/20/2021. 11:15AM. This note was authored by the medical scribe for me. I have reviewed, edited, and revised the note as needed. I am in agreement with the exam findings, imaging findings, and treatment plan.  Matthew Concepcion MD

## 2021-11-07 ENCOUNTER — EMERGENCY (EMERGENCY)
Facility: HOSPITAL | Age: 81
LOS: 1 days | Discharge: HOME | End: 2021-11-07
Attending: EMERGENCY MEDICINE
Payer: MEDICARE

## 2021-11-07 VITALS
HEART RATE: 63 BPM | DIASTOLIC BLOOD PRESSURE: 70 MMHG | TEMPERATURE: 100 F | OXYGEN SATURATION: 98 % | SYSTOLIC BLOOD PRESSURE: 176 MMHG | WEIGHT: 119.93 LBS | RESPIRATION RATE: 16 BRPM | HEIGHT: 55 IN

## 2021-11-07 PROCEDURE — 99283 EMERGENCY DEPT VISIT LOW MDM: CPT | Mod: GC

## 2021-11-07 PROCEDURE — 99284 EMERGENCY DEPT VISIT MOD MDM: CPT

## 2021-11-07 RX ORDER — ACETAMINOPHEN 500 MG
975 TABLET ORAL ONCE
Refills: 0 | Status: COMPLETED | OUTPATIENT
Start: 2021-11-07 | End: 2021-11-07

## 2021-11-07 RX ADMIN — Medication 1 TABLET(S): at 16:16

## 2021-11-07 RX ADMIN — Medication 975 MILLIGRAM(S): at 12:44

## 2021-11-07 NOTE — ED PROVIDER NOTE - ATTENDING CONTRIBUTION TO CARE
Patient with R jawline/gum abscess, no evidence of deep space infection, no trismus, dental consult.

## 2021-11-07 NOTE — ED PROVIDER NOTE - NSFOLLOWUPINSTRUCTIONS_ED_ALL_ED_FT
Please take Augmentin (antibiotic) one tablet two times per day for the next 10 days.       Dental Abscess    WHAT YOU NEED TO KNOW:    A dental abscess is a collection of pus in or around a tooth. A dental abscess is caused by bacteria. The bacteria can enter the tooth when the enamel (outer part of the tooth) is damaged by tooth decay. Bacteria can also enter the tooth through a chip in the tooth or a cut in the gum. Food particles that are stuck between the teeth for a long time may also lead to an abscess.     Dental Abscess         DISCHARGE INSTRUCTIONS:    Return to the emergency department if:   •You have severe pain in your tooth or jaw.      •You have trouble breathing because of pain or swelling.      Call your doctor if:   •Your symptoms get worse, even after treatment.      •Your mouth is bleeding.      •You cannot eat or drink because of pain or swelling.      •Your abscess returns.      •You have an injury that causes a crack in your tooth.      •You have questions or concerns about your condition or care.      Medicines: You may need any of the following:   •Antibiotics help treat a bacterial infection.       •NSAIDs, such as ibuprofen, help decrease swelling, pain, and fever. This medicine is available with or without a doctor's order. NSAIDs can cause stomach bleeding or kidney problems in certain people. If you take blood thinner medicine, always ask your healthcare provider if NSAIDs are safe for you. Always read the medicine label and follow directions.      •Acetaminophen decreases pain and fever. It is available without a doctor's order. Ask how much to take and how often to take it. Follow directions. Read the labels of all other medicines you are using to see if they also contain acetaminophen, or ask your doctor or pharmacist. Acetaminophen can cause liver damage if not taken correctly. Do not use more than 4 grams (4,000 milligrams) total of acetaminophen in one day.       •Prescription pain medicine may be given. Ask your healthcare provider how to take this medicine safely. Some prescription pain medicines contain acetaminophen. Do not take other medicines that contain acetaminophen without talking to your healthcare provider. Too much acetaminophen may cause liver damage. Prescription pain medicine may cause constipation. Ask your healthcare provider how to prevent or treat constipation.       •Take your medicine as directed. Contact your healthcare provider if you think your medicine is not helping or if you have side effects. Tell him of her if you are allergic to any medicine. Keep a list of the medicines, vitamins, and herbs you take. Include the amounts, and when and why you take them. Bring the list or the pill bottles to follow-up visits. Carry your medicine list with you in case of an emergency.      Self-care:   •Rinse your mouth every 2 hours with salt water. This will help keep the area clean.       •Gently brush your teeth twice a day with a soft tooth brush. This will help keep the area clean.       •Eat soft foods as directed. Soft foods may cause less pain. Examples include applesauce, yogurt, and cooked pasta. Ask your healthcare provider how long to follow this instruction.       •Apply a warm compress to your tooth or gum. Use a cotton ball or gauze soaked in warm water. Remove the compress in 10 minutes or when it becomes cool. Repeat 3 times a day.       Prevent another abscess:   •Brush your teeth at least 2 times a day with fluoride toothpaste.      •Use dental floss at least once a day to clean between your teeth.      •Rinse your mouth with water or mouthwash after meals and snacks. Chew sugarless gum.      •Avoid sugary and starchy food that can stick between your teeth. Limit drinks high in sugar, such as soda or fruit juice.      •See your dentist every 6 months for dental cleanings and oral exams.      Follow up with your doctor or dentist in 24 hours, or as directed: Your healthcare provider will need to check your teeth and gums. Write down your questions so you remember to ask them during your visits.

## 2021-11-07 NOTE — ED PROVIDER NOTE - NS ED ROS FT
Gen: No F/C/NS  Head: No falls   Eyes: No changes in vision   Resp: No cough   Cardiovascular: No chest pain   Gastroenteric: No N/V/D  :  No change in urine output, dysuria or hematuria   MS: + r jaw pain   Neuro: No headache   Skin: No new rash

## 2021-11-07 NOTE — CONSULT NOTE ADULT - SUBJECTIVE AND OBJECTIVE BOX
Patient is a 81y old  Female who presents with a chief complaint of     HPI: As per ED Team: 80yo F PMH HTN, HLD, heart murmur presents with R jaw pain. Onset 1 wk ago. 3 wks ago saw dentist, told she needs to be seen by gum specialist whom she saw but was not able to undergo any procedures secondary to high out of pocket payment it required. Now with R jaw pain. Taking Tylenol with minimal relief of symptoms. No F/C/NS. Pain with mov't and chewing. Given a mouthwash by dentist 3 wks ago, since then states her teeth have become more sensitive. Per son pt with urosepsis 5 yrs ago.      PAST MEDICAL & SURGICAL HISTORY:  HTN (hypertension)    Hypercholesteremia    GERD (gastroesophageal reflux disease)    Arthritis    Thrombocytopenia    Heart Murmur    No significant past surgical history      (-) heart valve replacement  (-) joint replacement  (-) pregnancy    MEDICATIONS  (STANDING):    Prolia  Valsartan HCTZ  Rosuvastatin  Ketoconazaole  Chlorhexidine 0.12% Rinse  Clotrimazole  Betamethasone    MEDICATIONS  (PRN):      Allergies    No Known Allergies    Intolerances      Vital Signs Last 24 Hrs  T(C): 37.8 (07 Nov 2021 10:55), Max: 37.8 (07 Nov 2021 10:55)  T(F): 100 (07 Nov 2021 10:55), Max: 100 (07 Nov 2021 10:55)  HR: 63 (07 Nov 2021 10:55) (63 - 63)  BP: 176/70 (07 Nov 2021 10:55) (176/70 - 176/70)  BP(mean): --  RR: 16 (07 Nov 2021 10:55) (16 - 16)  SpO2: 98% (07 Nov 2021 10:55) (98% - 98%)    EOE:  TMJ (-) clicks                    (-) pops                    (-) crepitus             Mandible FROM             Facial bones and MOM grossly intact             (-) trismus             (-) LAD             (+) swelling - mild right sided facial swelling approximating posterior border of ramus              (-) asymmetry             (+) slight tenderness to right sided palpation             (-) SOB             (-) dysphagia             (-) LOC    IOE:  permanent dentition: grossly intact and multiple carious teeth           hard/soft palate:  (-) palatal torus           tongue/FOM: WNL           labial/buccal mucosa: mild erythema/swelling            (+) percussion - #29 (lower right second premolar)           (+) palpation - buccal of #29           (+) swelling - right vestibule buccal to #29     Radiographs: Panoramic image and periapical image #29 obtained and reviewed. Periapical radiolucency noted around #19 and #29. #[id number] PFM bridge observed, with previously root canal treated #29 and post previously placed.      ASSESSMENT: Dental consulted to ED for right sided facial swelling. Clinical and radiographic assessment conducted. Mild erythema/buccal vestibular swelling noted approximating #29. Recommended incision and drainage of site, and explained benefits/risks of procedure. Patient declined incision and drainage and has appointment to see her outside dentist this Wednesday, November 10.     RECOMMENDATIONS:  1) Antibiotics and pain management as per ED.    2) Dental F/U with outpatient dentist for comprehensive dental care.   3) If any difficulty swallowing/breathing, fever occur, page dental.     Victor Hugo Herr, DMD #[id number]

## 2021-11-07 NOTE — ED PROVIDER NOTE - CLINICAL SUMMARY MEDICAL DECISION MAKING FREE TEXT BOX
80yo F PMH HTN, HLD, heart murmur presents with R jaw pain. R side of jaw erythematous, swollen, TTP. Right lower gum/molar swollen and TTP. Plan to give analgesics, antibiotics, c/s dental, reassess. Likely dental abscess.

## 2021-11-07 NOTE — ED PROVIDER NOTE - PHYSICAL EXAMINATION
G: NAD, cooperative with exam   H: NCAT  E: EOMI, no conjunctival pallor   M: Mucous membranes moist; R side of jaw erythematous, swollen, TTP. Right lower gum/molar swollen and TTP.   R: CTABL, nWOB  C: Nl S1/S2, no mrg  A: Soft, NT/ND, no rebound/guarding   MSK: moving all ext spontaneously

## 2021-11-07 NOTE — ED PROVIDER NOTE - NSICDXPASTMEDICALHX_GEN_ALL_CORE_FT
PAST MEDICAL HISTORY:  Arthritis     GERD (gastroesophageal reflux disease)     HTN (hypertension)     Hypercholesteremia     Thrombocytopenia

## 2021-11-07 NOTE — ED PROVIDER NOTE - OBJECTIVE STATEMENT
82yo F PMH HTN, HLD, heart murmur presents with R jaw pain. Onset 1 wk ago. 3 wks ago saw dentist, told she needs to be seen by gum specialist whom she saw but was not able to undergo any procedures secondary to high out of pocket payment it required. Now with R jaw pain. Taking Tylenol with minimal relief of symptoms. No F/C/NS. Pain with mov't and chewing. Given a mouthwash by dentist 3 wks ago, since then states her teeth have become more sensitive. Per son pt with urosepsis 5 yrs ago.

## 2021-11-07 NOTE — ED PROVIDER NOTE - NSFOLLOWUPCLINICS_GEN_ALL_ED_FT
[facility]  Dental  [address] [place] [id number]  Phone: [phone]  Fax:     Oral & Maxillofacial Surgery  Department of Dental Medicine  [address] [place], [place] [address]  Phone: [phone]  Fax: [phone]

## 2021-11-07 NOTE — ED ADULT NURSE NOTE - OBJECTIVE STATEMENT
pt ambulatory to [facility] accompanied by son c/o r lower dental pain , swelling of lower jaw pt saw dentist was told pt ambulatory to [facility] accompanied by son c/o r lower dental pain , swelling of lower jaw pt saw dentist was told needs cleaning and peridontest

## 2021-11-07 NOTE — ED PROVIDER NOTE - PATIENT PORTAL LINK FT
You can access the FollowMyHealth Patient Portal offered by [facility] by registering at the following website: [email or web address]. By joining [employer]’s FollowMyHealth portal, you will also be able to view your health information using other applications (apps) compatible with our system.

## 2021-11-07 NOTE — ED PROVIDER NOTE - PROGRESS NOTE DETAILS
Faith Cooper MD (PGY2) -  Pt seen by dental, pt declines I&D at this time, dc home on abx and dental f/u

## 2021-11-29 ENCOUNTER — APPOINTMENT (OUTPATIENT)
Dept: OTOLARYNGOLOGY | Facility: CLINIC | Age: 81
End: 2021-11-29
Payer: MEDICARE

## 2021-11-29 DIAGNOSIS — H93.13 TINNITUS, BILATERAL: ICD-10-CM

## 2021-11-29 PROCEDURE — 92557 COMPREHENSIVE HEARING TEST: CPT

## 2021-11-29 PROCEDURE — 99213 OFFICE O/P EST LOW 20 MIN: CPT

## 2021-11-29 PROCEDURE — 92567 TYMPANOMETRY: CPT

## 2021-12-11 PROBLEM — H93.13 TINNITUS OF BOTH EARS: Status: ACTIVE | Noted: 2020-11-09

## 2021-12-11 NOTE — DATA REVIEWED
[de-identified] : Hearing WNL at 250 Hz, sloping to a mild to moderately severe SNHL,Au.\par Type As Tymp, Au.

## 2021-12-11 NOTE — PHYSICAL EXAM
[Midline] : trachea located in midline position [Normal] : no rashes [de-identified] :  TMs normal wax removed AD

## 2021-12-11 NOTE — HISTORY OF PRESENT ILLNESS
[de-identified] : 81F f/u for  tinnitus and asymmetric sensorineural hearing loss- .Tinnitus has resolved-patient reports tinnitus occurred after removing HA at bedtime. Not wearing right HA. Patient reports hearing is stable. Patient denies otalgia, otorrhea, recent ear infections, dizziness, vertigo, headaches related to hearing.\par

## 2021-12-30 ENCOUNTER — RX RENEWAL (OUTPATIENT)
Age: 81
End: 2021-12-30

## 2022-01-06 ENCOUNTER — NON-APPOINTMENT (OUTPATIENT)
Age: 82
End: 2022-01-06

## 2022-01-06 ENCOUNTER — APPOINTMENT (OUTPATIENT)
Dept: CARDIOLOGY | Facility: CLINIC | Age: 82
End: 2022-01-06
Payer: MEDICARE

## 2022-01-06 VITALS
WEIGHT: 112 LBS | OXYGEN SATURATION: 99 % | SYSTOLIC BLOOD PRESSURE: 168 MMHG | HEIGHT: 57 IN | HEART RATE: 57 BPM | BODY MASS INDEX: 24.16 KG/M2 | DIASTOLIC BLOOD PRESSURE: 62 MMHG

## 2022-01-06 LAB
25(OH)D3 SERPL-MCNC: 63.3 NG/ML
ALBUMIN SERPL ELPH-MCNC: 4.3 G/DL
ALP BLD-CCNC: 69 U/L
ALT SERPL-CCNC: 19 U/L
ANION GAP SERPL CALC-SCNC: 14 MMOL/L
AST SERPL-CCNC: 24 U/L
BASOPHILS # BLD AUTO: 0.04 K/UL
BASOPHILS NFR BLD AUTO: 0.7 %
BILIRUB SERPL-MCNC: 0.6 MG/DL
BUN SERPL-MCNC: 19 MG/DL
CALCIUM SERPL-MCNC: 9.3 MG/DL
CHLORIDE SERPL-SCNC: 93 MMOL/L
CHOLEST SERPL-MCNC: 130 MG/DL
CO2 SERPL-SCNC: 29 MMOL/L
CREAT SERPL-MCNC: 0.85 MG/DL
EOSINOPHIL # BLD AUTO: 0.15 K/UL
EOSINOPHIL NFR BLD AUTO: 2.7 %
ESTIMATED AVERAGE GLUCOSE: 111 MG/DL
GLUCOSE SERPL-MCNC: 100 MG/DL
HBA1C MFR BLD HPLC: 5.5 %
HCT VFR BLD CALC: 38.9 %
HDLC SERPL-MCNC: 66 MG/DL
HGB BLD-MCNC: 13 G/DL
IMM GRANULOCYTES NFR BLD AUTO: 0.2 %
LDLC SERPL CALC-MCNC: 51 MG/DL
LYMPHOCYTES # BLD AUTO: 1.86 K/UL
LYMPHOCYTES NFR BLD AUTO: 33.4 %
MAN DIFF?: NORMAL
MCHC RBC-ENTMCNC: 30.6 PG
MCHC RBC-ENTMCNC: 33.4 GM/DL
MCV RBC AUTO: 91.5 FL
MONOCYTES # BLD AUTO: 0.49 K/UL
MONOCYTES NFR BLD AUTO: 8.8 %
NEUTROPHILS # BLD AUTO: 3.02 K/UL
NEUTROPHILS NFR BLD AUTO: 54.2 %
NONHDLC SERPL-MCNC: 64 MG/DL
PLATELET # BLD AUTO: 179 K/UL
POTASSIUM SERPL-SCNC: 4.3 MMOL/L
PROT SERPL-MCNC: 6.9 G/DL
RBC # BLD: 4.25 M/UL
RBC # FLD: 11.9 %
SODIUM SERPL-SCNC: 136 MMOL/L
T4 SERPL-MCNC: 9.4 UG/DL
TRIGL SERPL-MCNC: 66 MG/DL
TSH SERPL-ACNC: 3.32 UIU/ML
WBC # FLD AUTO: 5.57 K/UL

## 2022-01-06 PROCEDURE — 93000 ELECTROCARDIOGRAM COMPLETE: CPT

## 2022-01-06 PROCEDURE — 99214 OFFICE O/P EST MOD 30 MIN: CPT

## 2022-01-06 PROCEDURE — 36415 COLL VENOUS BLD VENIPUNCTURE: CPT

## 2022-01-25 ENCOUNTER — APPOINTMENT (OUTPATIENT)
Dept: ENDOCRINOLOGY | Facility: CLINIC | Age: 82
End: 2022-01-25

## 2022-03-15 ENCOUNTER — APPOINTMENT (OUTPATIENT)
Dept: ENDOCRINOLOGY | Facility: CLINIC | Age: 82
End: 2022-03-15
Payer: MEDICARE

## 2022-03-15 VITALS — WEIGHT: 114 LBS | TEMPERATURE: 97.8 F | HEIGHT: 57 IN | BODY MASS INDEX: 24.59 KG/M2

## 2022-03-15 PROCEDURE — 77080 DXA BONE DENSITY AXIAL: CPT

## 2022-03-15 PROCEDURE — 96372 THER/PROPH/DIAG INJ SC/IM: CPT

## 2022-03-15 RX ORDER — DENOSUMAB 60 MG/ML
60 INJECTION SUBCUTANEOUS
Qty: 1 | Refills: 0 | Status: COMPLETED | OUTPATIENT
Start: 2022-03-15

## 2022-03-15 RX ADMIN — DENOSUMAB 0 MG/ML: 60 INJECTION SUBCUTANEOUS at 00:00

## 2022-07-02 ENCOUNTER — RX RENEWAL (OUTPATIENT)
Age: 82
End: 2022-07-02

## 2022-07-12 ENCOUNTER — LABORATORY RESULT (OUTPATIENT)
Age: 82
End: 2022-07-12

## 2022-07-12 ENCOUNTER — NON-APPOINTMENT (OUTPATIENT)
Age: 82
End: 2022-07-12

## 2022-07-12 ENCOUNTER — APPOINTMENT (OUTPATIENT)
Dept: CARDIOLOGY | Facility: CLINIC | Age: 82
End: 2022-07-12

## 2022-07-12 VITALS
DIASTOLIC BLOOD PRESSURE: 60 MMHG | WEIGHT: 114 LBS | SYSTOLIC BLOOD PRESSURE: 156 MMHG | HEIGHT: 57 IN | BODY MASS INDEX: 24.59 KG/M2 | HEART RATE: 53 BPM | OXYGEN SATURATION: 100 %

## 2022-07-12 PROCEDURE — 36415 COLL VENOUS BLD VENIPUNCTURE: CPT

## 2022-07-12 PROCEDURE — 93000 ELECTROCARDIOGRAM COMPLETE: CPT

## 2022-07-12 PROCEDURE — 86580 TB INTRADERMAL TEST: CPT

## 2022-07-12 PROCEDURE — 99215 OFFICE O/P EST HI 40 MIN: CPT

## 2022-09-21 ENCOUNTER — APPOINTMENT (OUTPATIENT)
Dept: ORTHOPEDIC SURGERY | Facility: CLINIC | Age: 82
End: 2022-09-21

## 2022-09-21 VITALS
HEART RATE: 61 BPM | WEIGHT: 118 LBS | HEIGHT: 57 IN | SYSTOLIC BLOOD PRESSURE: 148 MMHG | DIASTOLIC BLOOD PRESSURE: 67 MMHG | BODY MASS INDEX: 25.46 KG/M2

## 2022-09-21 PROCEDURE — 20610 DRAIN/INJ JOINT/BURSA W/O US: CPT | Mod: 50

## 2022-09-21 PROCEDURE — 73564 X-RAY EXAM KNEE 4 OR MORE: CPT | Mod: 50

## 2022-09-21 PROCEDURE — 99215 OFFICE O/P EST HI 40 MIN: CPT | Mod: 25

## 2022-09-29 ENCOUNTER — APPOINTMENT (OUTPATIENT)
Dept: ENDOCRINOLOGY | Facility: CLINIC | Age: 82
End: 2022-09-29

## 2022-09-29 VITALS
RESPIRATION RATE: 16 BRPM | HEIGHT: 57 IN | HEART RATE: 87 BPM | TEMPERATURE: 98.6 F | SYSTOLIC BLOOD PRESSURE: 126 MMHG | DIASTOLIC BLOOD PRESSURE: 62 MMHG | OXYGEN SATURATION: 99 % | WEIGHT: 118 LBS | BODY MASS INDEX: 25.46 KG/M2

## 2022-09-29 PROCEDURE — 96372 THER/PROPH/DIAG INJ SC/IM: CPT

## 2022-09-29 PROCEDURE — 99213 OFFICE O/P EST LOW 20 MIN: CPT | Mod: 25

## 2022-09-29 RX ORDER — DENOSUMAB 60 MG/ML
60 INJECTION SUBCUTANEOUS
Qty: 1 | Refills: 0 | Status: COMPLETED | OUTPATIENT
Start: 2022-09-29

## 2022-09-29 RX ADMIN — DENOSUMAB 0 MG/ML: 60 INJECTION SUBCUTANEOUS at 00:00

## 2022-10-25 ENCOUNTER — APPOINTMENT (OUTPATIENT)
Dept: ORTHOPEDIC SURGERY | Facility: CLINIC | Age: 82
End: 2022-10-25

## 2022-10-25 VITALS
HEART RATE: 56 BPM | SYSTOLIC BLOOD PRESSURE: 150 MMHG | WEIGHT: 118 LBS | HEIGHT: 57 IN | BODY MASS INDEX: 25.46 KG/M2 | DIASTOLIC BLOOD PRESSURE: 64 MMHG

## 2022-10-25 PROCEDURE — 20610 DRAIN/INJ JOINT/BURSA W/O US: CPT | Mod: 50

## 2022-10-25 PROCEDURE — 99214 OFFICE O/P EST MOD 30 MIN: CPT | Mod: 25

## 2022-10-25 NOTE — PROCEDURE
[de-identified] :  Procedure Note:  Anatomic Location:  Right Knee  Diagnosis:  Arthritis  Procedure:  Injection of Monovisc 4 mL  Lot Number:           [id number]                            expiration Date:   April 30, 2024  Local Spray: Ethyl Chloride.  Skin preparation with Alcohol.  Patient has consented for the procedure.  Injection  through a lateral parapatella approach.  Patient tolerated the procedure well.   Procedure Note:  Anatomic Location: Left knee  Diagnosis:  Arthritis  Procedure:  Injection of Monovisc 4 mL  Lot Number:           [id number]                            expiration Date:   April 30, 2024  Local Spray: Ethyl Chloride.  Skin preparation with Alcohol.  Patient has consented for the procedure.  Injection  through a lateral parapatella approach.  Patient tolerated the procedure well.

## 2022-10-25 NOTE — PHYSICAL EXAM
[de-identified] : Right Knee has 10 to 95 degrees of motion with good medial  lateral and anterior posterior stability.  There is a small effusion.  There is no Baker's cyst.  There is  patellofemoral crepitus.  She does have a significant osteoarthritis as was previously seen by x-ray and she has pain over the medial joint line as well as pain with compression of her patella.  There are some discomfort laterally.   Left  Knee   has 0 to 120 degrees of motion with good medial lateral and anterior posterior stability.  In this knee also the patient has pain over the medial joint line and some pain with compression of her patella.  She does have some patellofemoral crepitus.  She has good medial lateral and anterior posterior stability as before.
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
## 2022-10-25 NOTE — DISCUSSION/SUMMARY
[de-identified] : This patient does have the severe degenerative osteoarthritis in both knees however she did definitely wants us to try all conservative measures.  At this time she would like to stay with oral anti-inflammatory agents and would like a injection although the cortisone injections have worn off and its only 2 months.  At this time she will try injections with hyaluronic acid and she would like the Monovisc injections.  This was discussed with both she and her  and surgery would be held for least 3months after Monovisc as well as the cortisone injections.  They were given and she appears to have tolerated well.

## 2022-11-28 ENCOUNTER — RESULT REVIEW (OUTPATIENT)
Age: 82
End: 2022-11-28

## 2022-11-28 ENCOUNTER — APPOINTMENT (OUTPATIENT)
Dept: OTOLARYNGOLOGY | Facility: CLINIC | Age: 82
End: 2022-11-28

## 2022-11-28 VITALS — WEIGHT: 180 LBS | BODY MASS INDEX: 41.66 KG/M2 | HEIGHT: 55 IN

## 2022-11-28 PROCEDURE — 99214 OFFICE O/P EST MOD 30 MIN: CPT | Mod: 25

## 2022-11-28 PROCEDURE — 69210 REMOVE IMPACTED EAR WAX UNI: CPT

## 2022-11-28 PROCEDURE — 92557 COMPREHENSIVE HEARING TEST: CPT

## 2022-11-28 PROCEDURE — 92567 TYMPANOMETRY: CPT

## 2022-12-02 ENCOUNTER — OUTPATIENT (OUTPATIENT)
Dept: OUTPATIENT SERVICES | Facility: HOSPITAL | Age: 82
LOS: 1 days | End: 2022-12-02
Payer: MEDICARE

## 2022-12-02 ENCOUNTER — APPOINTMENT (OUTPATIENT)
Dept: MRI IMAGING | Facility: IMAGING CENTER | Age: 82
End: 2022-12-02

## 2022-12-02 DIAGNOSIS — H90.3 SENSORINEURAL HEARING LOSS, BILATERAL: ICD-10-CM

## 2022-12-02 PROCEDURE — 70551 MRI BRAIN STEM W/O DYE: CPT | Mod: 26,MH

## 2022-12-02 PROCEDURE — 70551 MRI BRAIN STEM W/O DYE: CPT

## 2022-12-05 NOTE — HISTORY OF PRESENT ILLNESS
[de-identified] : Has hearing aid AD but not using as "hurting ear" tried several times and does not tolerate -

## 2022-12-05 NOTE — PHYSICAL EXAM
[Normal] : mucosa is normal [Midline] : trachea located in midline position [FreeTextEntry1] : no TMJ [de-identified] : wax removed AS, TMs normal

## 2022-12-29 ENCOUNTER — RX RENEWAL (OUTPATIENT)
Age: 82
End: 2022-12-29

## 2023-01-17 ENCOUNTER — APPOINTMENT (OUTPATIENT)
Dept: CARDIOLOGY | Facility: CLINIC | Age: 83
End: 2023-01-17
Payer: MEDICARE

## 2023-01-17 ENCOUNTER — NON-APPOINTMENT (OUTPATIENT)
Age: 83
End: 2023-01-17

## 2023-01-17 VITALS
BODY MASS INDEX: 26.61 KG/M2 | DIASTOLIC BLOOD PRESSURE: 60 MMHG | OXYGEN SATURATION: 99 % | SYSTOLIC BLOOD PRESSURE: 118 MMHG | HEIGHT: 55 IN | HEART RATE: 54 BPM | WEIGHT: 115 LBS

## 2023-01-17 DIAGNOSIS — M17.0 BILATERAL PRIMARY OSTEOARTHRITIS OF KNEE: ICD-10-CM

## 2023-01-17 DIAGNOSIS — M54.50 LOW BACK PAIN, UNSPECIFIED: ICD-10-CM

## 2023-01-17 PROCEDURE — 36415 COLL VENOUS BLD VENIPUNCTURE: CPT

## 2023-01-17 PROCEDURE — 99215 OFFICE O/P EST HI 40 MIN: CPT

## 2023-01-17 PROCEDURE — 93000 ELECTROCARDIOGRAM COMPLETE: CPT

## 2023-01-17 PROCEDURE — 93306 TTE W/DOPPLER COMPLETE: CPT

## 2023-01-17 RX ORDER — CLOTRIMAZOLE AND BETAMETHASONE DIPROPIONATE 10; .5 MG/ML; MG/ML
1-0.05 LOTION TOPICAL
Qty: 3 | Refills: 0 | Status: ACTIVE | COMMUNITY
Start: 2023-01-17 | End: 1900-01-01

## 2023-01-18 LAB
25(OH)D3 SERPL-MCNC: 88 NG/ML
ALBUMIN SERPL ELPH-MCNC: 4.4 G/DL
ALP BLD-CCNC: 62 U/L
ALT SERPL-CCNC: 21 U/L
ANION GAP SERPL CALC-SCNC: 14 MMOL/L
AST SERPL-CCNC: 27 U/L
BASOPHILS # BLD AUTO: 0.05 K/UL
BASOPHILS NFR BLD AUTO: 1.1 %
BILIRUB SERPL-MCNC: 0.5 MG/DL
BUN SERPL-MCNC: 20 MG/DL
CALCIUM SERPL-MCNC: 9.6 MG/DL
CHLORIDE SERPL-SCNC: 96 MMOL/L
CHOLEST SERPL-MCNC: 145 MG/DL
CO2 SERPL-SCNC: 28 MMOL/L
CREAT SERPL-MCNC: 0.85 MG/DL
EGFR: 68 ML/MIN/1.73M2
EOSINOPHIL # BLD AUTO: 0.23 K/UL
EOSINOPHIL NFR BLD AUTO: 4.9 %
ESTIMATED AVERAGE GLUCOSE: 114 MG/DL
GLUCOSE SERPL-MCNC: 97 MG/DL
HBA1C MFR BLD HPLC: 5.6 %
HCT VFR BLD CALC: 40 %
HDLC SERPL-MCNC: 74 MG/DL
HGB BLD-MCNC: 13.2 G/DL
IMM GRANULOCYTES NFR BLD AUTO: 0 %
LDLC SERPL CALC-MCNC: 58 MG/DL
LYMPHOCYTES # BLD AUTO: 2.05 K/UL
LYMPHOCYTES NFR BLD AUTO: 43.4 %
MAN DIFF?: NORMAL
MCHC RBC-ENTMCNC: 30.8 PG
MCHC RBC-ENTMCNC: 33 GM/DL
MCV RBC AUTO: 93.2 FL
MONOCYTES # BLD AUTO: 0.43 K/UL
MONOCYTES NFR BLD AUTO: 9.1 %
NEUTROPHILS # BLD AUTO: 1.96 K/UL
NEUTROPHILS NFR BLD AUTO: 41.5 %
NONHDLC SERPL-MCNC: 70 MG/DL
PLATELET # BLD AUTO: 151 K/UL
POTASSIUM SERPL-SCNC: 4.1 MMOL/L
PROT SERPL-MCNC: 6.8 G/DL
RBC # BLD: 4.29 M/UL
RBC # FLD: 12.1 %
SODIUM SERPL-SCNC: 138 MMOL/L
T4 SERPL-MCNC: 9.5 UG/DL
TRIGL SERPL-MCNC: 60 MG/DL
TSH SERPL-ACNC: 3.4 UIU/ML
WBC # FLD AUTO: 4.72 K/UL

## 2023-01-24 ENCOUNTER — APPOINTMENT (OUTPATIENT)
Dept: ORTHOPEDIC SURGERY | Facility: CLINIC | Age: 83
End: 2023-01-24

## 2023-03-01 NOTE — HISTORY OF PRESENT ILLNESS
[Alendronate (Fosomax)] : Alendronate [Risedronate (Actonel)] : Risedronate [Denosumab (Prolia)] : Denosumab [FreeTextEntry1] : Patient returns for a follow up visit for osteoporosis. Since the last visit pt has no significant interval health changes. No interval surgery, hospitalizations, fractures, or change in medications.\par \par Pt has been told of osteoporosis for many years. She took Fosamax for several years in the distant past. She stopped medicine at the time of the left humerus fracture. She was off therapy for 5 or more years. BMD January 2017 Spine -2.3 total hip -2.4, femoral neck -2.7. Restarted Fosamax, taking correctly not tolerating well some UGI sx. No thigh pain. Last DDS few mons ago. No interval fractures. Pt switched to Actonel 1/2018, but later transitioned to Boniva, did not tolerate (aches and pains), switched back to Actonel. Took correctly, tolerated well. BMD 12/2020 markedly decreased despite Rx. Pt started Prolia 1/2021. Tolerating well. No thigh pain, no interval fx. Last DDS 3 within the last 6 months ago. No ONJ.\par \par Had cataract surgery. PSTs normal.

## 2023-03-01 NOTE — ADDENDUM
[FreeTextEntry1] : 3/1/23\par Pt told of need for dental extraction.\par Spoke to son about this.  Recommend patient may go ahead with extraction around 5 months after last Prolia dose and can delay next dose for approximately 1 month.\par Jaylon SL, Mirian TB, Huber T, Byron ER, Randy BB, Duarte D, American Association of Oral and Maxillofacial Surgeons’ Position Paper on Medication-Related Osteonecrosis of the Jaw - 2022 Update, Journal of Oral and Maxillofacial Surgery (2022) \par

## 2023-03-01 NOTE — PHYSICAL EXAM
[Alert] : alert [Well Nourished] : well nourished [No Acute Distress] : no acute distress [Well Developed] : well developed [Normal Sclera/Conjunctiva] : normal sclera/conjunctiva [EOMI] : extra ocular movement intact [No Proptosis] : no proptosis [Thyroid Not Enlarged] : the thyroid was not enlarged [No Thyroid Nodules] : no palpable thyroid nodules [Clear to Auscultation] : lungs were clear to auscultation bilaterally [Normal S1, S2] : normal S1 and S2 [Normal Rate] : heart rate was normal [Regular Rhythm] : with a regular rhythm [No Edema] : no peripheral edema [Normal Bowel Sounds] : normal bowel sounds [Not Tender] : non-tender [Not Distended] : not distended [Soft] : abdomen soft [Normal Anterior Cervical Nodes] : no anterior cervical lymphadenopathy [No Spinal Tenderness] : no spinal tenderness [Spine Straight] : spine straight [No Stigmata of Cushings Syndrome] : no stigmata of Cushings Syndrome [Normal Gait] : normal gait [Normal Reflexes] : deep tendon reflexes were 2+ and symmetric [No Tremors] : no tremors [Oriented x3] : oriented to person, place, and time [de-identified] : 2/6 systolic murmur

## 2023-03-01 NOTE — PROCEDURE
[FreeTextEntry1] : Bone Density March 2022 \par Spine -2.5 osteoporosis +5.1%\par Total Hip -2.8 osteoporosis +6.0%\par Femoral Neck -2.8 osteoporosis +11.8%\par Proximal Radius -1.5 osteoporosis no significant change\par \par BMD  December 30, 2020\par Indication: Compared to 2018\par Lumbar spine 1-4  BMD: 0.736 g/cm2  T score -2.8  WHO Classification osteoporosis -3.2%\par Total hip   BMD: 0.563 g/cm2  T score -3.1  WHO Classification osteoporosis -10.2%\par   Femoral neck  BMD: 0.481 g/cm2  T score -3.3  WHO Classification -12.4%   \par Proximal wrist  BMD: 0.619 g/cm2  T score -1.3  WHO Classification osteopenia no significant change  \par \par

## 2023-03-01 NOTE — ASSESSMENT
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 \par \par Pt has been told of osteoporosis for many years. She took Fosamax for several years in the distant past. She stopped medicine at the time of the left humerus fracture. She was off therapy for 5 or more years.\par Bone mineral density January 2017 low. Restarted Fosamax, took correctly not tolerated well some UGI sx. No thigh pain. Pt switched to Actonel 1/2018, but later transitioned to Boniva, did not tolerate (aches and pains), switched back to Actonel. Took correctly, tolerated well. BMD 12/2020 markedly decreased despite Rx. Options of medical therapy for osteoporosis were again reviewed in great detail. Pt started Prolia 1/2021. Tolerating well. No thigh pain, no interval fx. No ONJ.BMD March 2022 i significant improvement.  Continue Prolia, buy and bill.\par \par Pt may need future dental extraction. Discussed ADA guidelines regarding osteoporosis rx, ONJ, and oral surgery.  Data to include suggestion of the resumption of osteoporosis rx after ONJ. Norma NB, Sadie JT, Candido N, José Antonio S, Ju PW, Lauren X, Rakel A, Terry RB, Kiley M. Invasive Oral Procedures and Events in Postmenopausal Women With Osteoporosis Treated With Denosumab for Up to 10 Years. J Clin Endocrinol Metab. 2019 Jun 1;104(6):3047-4468. Pt can have work done towards the end of a 6 month cycle and then delay the next dose of Prolia by 1 month. All questions answered. Any further questions DDS can contact me.\par \par \par \par F/u in 6 months

## 2023-03-01 NOTE — END OF VISIT
[FreeTextEntry3] : This note was written by Magui Ford on ( September 29, 2022) acting as a medical scribe for Dr. Concepcion This note was authored by the medical scribe for me. I have reviewed, edited, and revised the note as needed. I am in agreement with the exam findings, imaging findings, and treatment plan.  Matthew Concepcion MD

## 2023-03-07 ENCOUNTER — APPOINTMENT (OUTPATIENT)
Dept: ORTHOPEDIC SURGERY | Facility: CLINIC | Age: 83
End: 2023-03-07
Payer: MEDICARE

## 2023-03-07 VITALS — WEIGHT: 119 LBS | BODY MASS INDEX: 27.54 KG/M2 | HEIGHT: 55 IN

## 2023-03-07 PROCEDURE — 20610 DRAIN/INJ JOINT/BURSA W/O US: CPT | Mod: 50

## 2023-03-07 PROCEDURE — 99214 OFFICE O/P EST MOD 30 MIN: CPT | Mod: 25

## 2023-03-07 NOTE — PHYSICAL EXAM
[de-identified] : Right Knee has 10 to 95 degrees of motion with good medial  lateral and anterior posterior stability.  There is moderate effusion.  There is no Baker's cyst.  There is  patellofemoral crepitus.  She does have a significant osteoarthritis as was previously seen by x-ray and she has pain over the medial joint line as well as pain with compression of her patella.  She has severe bilateral medial osteoarthritis. \par \par \par   Left  Knee   has 0 to 120 degrees of motion with good medial lateral and anterior posterior stability.  In this knee also the patient has pain over the medial joint line and some pain with compression of her patella.  She does have patellofemoral crepitus.  She has good medial lateral and anterior posterior stability as before.

## 2023-03-07 NOTE — HISTORY OF PRESENT ILLNESS
[de-identified] : 84 y/o female presents for follow up evaluation of pain in B/L knees, which has been presents for several years. \par Right knee pain > left knee. Pain is worse with walking and going up and down stairs. \par Reports clicking in right knee. Rest helps. \par Denies buckling, locking. No numbness or tingling. \par Last had injections in B/L knees on 10/25/22 (Monovisc), which provided no relief.\par Previously, she had cortisone injection which were more successful in treating the pain, which she would like done again today.

## 2023-03-07 NOTE — REASON FOR VISIT
[Follow-Up Visit] : a follow-up visit for [Spouse] : spouse [Other: _____] : [unfilled] [FreeTextEntry2] : bilateral knee pain

## 2023-03-07 NOTE — PROCEDURE
[de-identified] : Procedure Note:\par \par Anatomic Location:  Right  Knee\par \par Diagnosis:  Arthritis\par \par Procedure:  Injection of 2cc  of Marcaine 0.25% plain and Celestone 1cc, 6mg\par \par Local Spray: Ethyl Chloride.\par \par \par Patient has consented for the procedure.\par \par Injection  through a lateral parapatella approach.\par \par Patient tolerated the procedure well.\par \par Patient instructed to call the office if any reaction, fever, chills, increased erythema or swelling.   [phone].\par \par Procedure Note:\par \par Anatomic Location:  Left Knee\par \par Diagnosis:  Arthritis\par \par Procedure:  Injection of 2cc  of Marcaine 0.25% plain and Celestone 1cc, 6mg\par \par Local Spray: Ethyl Chloride.\par \par \par Patient has consented for the procedure.\par \par Injection  through a lateral parapatella approach.\par \par Patient tolerated the procedure well.\par \par Patient instructed to call the office if any reaction, fever, chills, increased erythema or swelling.   [phone].

## 2023-03-07 NOTE — DISCUSSION/SUMMARY
[de-identified] : This patient does have the severe degenerative osteoarthritis in both knees however she did definitely wants to stay on conservative measures.  Orthopedic decision making would be that she is a very good candidate for total knee replacement.  And that she is managing satisfactorily and may do better on the cortisone that she did on the gel we will repeat the cortisone injections today as was done.  Return visit in 3 months.\par \par

## 2023-03-16 ENCOUNTER — NON-APPOINTMENT (OUTPATIENT)
Age: 83
End: 2023-03-16

## 2023-03-17 DIAGNOSIS — R20.0 ANESTHESIA OF SKIN: ICD-10-CM

## 2023-03-17 DIAGNOSIS — M15.9 POLYOSTEOARTHRITIS, UNSPECIFIED: ICD-10-CM

## 2023-03-17 DIAGNOSIS — M25.551 PAIN IN RIGHT HIP: ICD-10-CM

## 2023-04-24 ENCOUNTER — APPOINTMENT (OUTPATIENT)
Dept: ENDOCRINOLOGY | Facility: CLINIC | Age: 83
End: 2023-04-24
Payer: MEDICARE

## 2023-04-24 PROCEDURE — 96372 THER/PROPH/DIAG INJ SC/IM: CPT

## 2023-04-24 RX ORDER — DENOSUMAB 60 MG/ML
60 INJECTION SUBCUTANEOUS
Qty: 1 | Refills: 0 | Status: COMPLETED | OUTPATIENT
Start: 2023-04-20

## 2023-05-05 NOTE — ED PROVIDER NOTE - CCCP TRG CHIEF CMPLNT
Addended by: KEVIN BALTAZAR on: 5/5/2023 03:35 PM     Modules accepted: Orders    
mouth pain/swelling
Detail Level: Zone

## 2023-05-19 DIAGNOSIS — R26.81 UNSTEADINESS ON FEET: ICD-10-CM

## 2023-05-19 DIAGNOSIS — M12.811 OTHER SPECIFIC ARTHROPATHIES, NOT ELSEWHERE CLASSIFIED, RIGHT SHOULDER: ICD-10-CM

## 2023-05-19 DIAGNOSIS — M17.0 BILATERAL PRIMARY OSTEOARTHRITIS OF KNEE: ICD-10-CM

## 2023-07-02 ENCOUNTER — NON-APPOINTMENT (OUTPATIENT)
Age: 83
End: 2023-07-02

## 2023-07-10 ENCOUNTER — RX RENEWAL (OUTPATIENT)
Age: 83
End: 2023-07-10

## 2023-07-28 NOTE — ED ADULT NURSE NOTE - PUPILS PERRL
Informed pt an office visit is needed per . pt states he currently resides in [facility]. States he has been there a few months. Pt does not know if there is an in house doctor that oversees his care. Pt said he saw his Endocrinologist today who recommended he follow up with . pt is wanting to schedule an appointment however he is not sure about transport. Writer will have our PSR call pt to schedule after talking with nurse abarca getting more information about pt care at this facility. Writer tried calling NH 3 times, was transferred, phone rang and rang and call was disconnected. Tried several times.  
Patients wife requesting orders for more physical Therapy for the patient. Please call carlos   
Received callback from agency RN who states today is her first day working with this patient and she didn't know anymore info. She did state that pt has  listed as provider who has been writing orders for this patient as of early June 2023. She is going to talk to the patient about his request for PT orders and will call us back with any other questions. Writer explained that pt had called this office for orders however if pt is in nursing home and has a provider that oversees his care there is no need for pt to leave facility for office visits with  unless this is patient preferred.  can take over care when and if pt is released from nursing home .  
yes

## 2023-08-08 ENCOUNTER — APPOINTMENT (OUTPATIENT)
Dept: CARDIOLOGY | Facility: CLINIC | Age: 83
End: 2023-08-08
Payer: MEDICARE

## 2023-08-08 VITALS
HEIGHT: 55 IN | BODY MASS INDEX: 30.09 KG/M2 | HEART RATE: 58 BPM | OXYGEN SATURATION: 98 % | DIASTOLIC BLOOD PRESSURE: 70 MMHG | SYSTOLIC BLOOD PRESSURE: 140 MMHG | WEIGHT: 130 LBS

## 2023-08-08 DIAGNOSIS — R94.31 ABNORMAL ELECTROCARDIOGRAM [ECG] [EKG]: ICD-10-CM

## 2023-08-08 LAB
25(OH)D3 SERPL-MCNC: 58.6 NG/ML
ALBUMIN SERPL ELPH-MCNC: 4.3 G/DL
ALP BLD-CCNC: 63 U/L
ALT SERPL-CCNC: 19 U/L
ANION GAP SERPL CALC-SCNC: 10 MMOL/L
AST SERPL-CCNC: 26 U/L
BILIRUB SERPL-MCNC: 0.6 MG/DL
BUN SERPL-MCNC: 16 MG/DL
CALCIUM SERPL-MCNC: 9.1 MG/DL
CHLORIDE SERPL-SCNC: 98 MMOL/L
CHOLEST SERPL-MCNC: 127 MG/DL
CO2 SERPL-SCNC: 27 MMOL/L
CREAT SERPL-MCNC: 0.81 MG/DL
EGFR: 72 ML/MIN/1.73M2
ESTIMATED AVERAGE GLUCOSE: 111 MG/DL
FOLATE SERPL-MCNC: >20 NG/ML
GLUCOSE SERPL-MCNC: 88 MG/DL
HBA1C MFR BLD HPLC: 5.5 %
HDLC SERPL-MCNC: 65 MG/DL
LDLC SERPL CALC-MCNC: 49 MG/DL
NONHDLC SERPL-MCNC: 61 MG/DL
POTASSIUM SERPL-SCNC: 4.5 MMOL/L
PROT SERPL-MCNC: 6.2 G/DL
SODIUM SERPL-SCNC: 135 MMOL/L
T4 SERPL-MCNC: 8.3 UG/DL
TRIGL SERPL-MCNC: 57 MG/DL
TSH SERPL-ACNC: 2.81 UIU/ML
VIT B12 SERPL-MCNC: 1285 PG/ML

## 2023-08-08 PROCEDURE — 93000 ELECTROCARDIOGRAM COMPLETE: CPT

## 2023-08-08 PROCEDURE — 86580 TB INTRADERMAL TEST: CPT

## 2023-08-08 PROCEDURE — 99214 OFFICE O/P EST MOD 30 MIN: CPT

## 2023-08-08 RX ORDER — DENOSUMAB 60 MG/ML
60 INJECTION SUBCUTANEOUS
Refills: 0 | Status: ACTIVE | COMMUNITY

## 2023-08-08 NOTE — PHYSICAL EXAM
[Well Developed] : well developed [Well Nourished] : well nourished [No Acute Distress] : no acute distress [Normal Conjunctiva] : normal conjunctiva [Normal Venous Pressure] : normal venous pressure [No Carotid Bruit] : no carotid bruit [Normal S1, S2] : normal S1, S2 [No Rub] : no rub [No Gallop] : no gallop [Clear Lung Fields] : clear lung fields [Good Air Entry] : good air entry [No Respiratory Distress] : no respiratory distress  [Soft] : abdomen soft [Non Tender] : non-tender [No Masses/organomegaly] : no masses/organomegaly [Normal Bowel Sounds] : normal bowel sounds [Normal Gait] : normal gait [No Edema] : no edema [No Cyanosis] : no cyanosis [No Clubbing] : no clubbing [No Varicosities] : no varicosities [No Rash] : no rash [No Skin Lesions] : no skin lesions [Moves all extremities] : moves all extremities [No Focal Deficits] : no focal deficits [Normal Speech] : normal speech [Alert and Oriented] : alert and oriented [Normal memory] : normal memory [de-identified] : With a 1/6 stock ejection murmur at left sternal border

## 2023-08-08 NOTE — REASON FOR VISIT
[FreeTextEntry1] : The patient is here today for follow-up of hypertension, osteoporosis, osteoarthritis, abdominal bloating, and some difficulty sleeping.  The patient's ventricular blood pressures regularly and it is usually in the 135 range.  The patient has ongoing difficulties with arthritis which seems to be improved significantly by physical therapy and the adult  program that she attends with her .  She does describe approximately 2 episodes a week where she wakes up in the middle of the night short of breath and has to get up and walk around for about half an hour.  The patient tried Breathe Right strips but it did not help at all.  Patient will be scheduled for sleep studies as a screening.  Otherwise, the patient appears clinically stable.

## 2023-08-08 NOTE — REVIEW OF SYSTEMS
[Negative] : Heme/Lymph [FreeTextEntry6] : Shortness of breath at night as described above [FreeTextEntry7] : Presents with chronic abdominal bloating.  Patient takes Gas-X with no improvement.  Patient will try Align

## 2023-08-08 NOTE — DISCUSSION/SUMMARY
[FreeTextEntry1] : Hypertension–generally well-controlled.  EKG today was unchanged. Sleep issues–will try home sleep study for initial evaluation Abdominal bloating–trial of probiotics Osteoarthritis and osteoporosis–continue physical therapy and  program Health maintenance–laboratory data drawn today Return visit 6 months with phone or email follow-up regarding aforementioned issues   Total time of the encounter: 30 minutes which included but was not limited to the following: Face-to-face and non face-to-face time personally spent by the physician preparing to see the patient, obtaining and/or resuming separately obtained history, performing a medically appropriate examination and/or evaluation, counseling and educating the patient/family/caregiver, ordering medications, tests or procedures, referring and communicating with other healthcare professionals, documenting clinical information in the electronic health record, independently interpreting results and communicated results to the patient/family/caregiver and care coordination.  [EKG obtained to assist in diagnosis and management of assessed problem(s)] : EKG obtained to assist in diagnosis and management of assessed problem(s)

## 2023-08-17 LAB
APPEARANCE: CLEAR
BACTERIA: NEGATIVE /HPF
BILIRUBIN URINE: NEGATIVE
BLOOD URINE: NEGATIVE
CAST: 0 /LPF
COLOR: YELLOW
EPITHELIAL CELLS: 0 /HPF
GLUCOSE QUALITATIVE U: NEGATIVE MG/DL
KETONES URINE: NEGATIVE MG/DL
LEUKOCYTE ESTERASE URINE: ABNORMAL
MICROSCOPIC-UA: NORMAL
NITRITE URINE: NEGATIVE
PH URINE: 7.5
PROTEIN URINE: NEGATIVE MG/DL
RED BLOOD CELLS URINE: 2 /HPF
SPECIFIC GRAVITY URINE: 1.01
UROBILINOGEN URINE: 0.2 MG/DL
WHITE BLOOD CELLS URINE: 0 /HPF

## 2023-08-20 LAB — BACTERIA UR CULT: NORMAL

## 2023-09-09 ENCOUNTER — TRANSCRIPTION ENCOUNTER (OUTPATIENT)
Age: 83
End: 2023-09-09

## 2023-09-09 ENCOUNTER — INPATIENT (INPATIENT)
Facility: HOSPITAL | Age: 83
LOS: 3 days | Discharge: NURSING, REHAB OR LONG-TERM CARE | DRG: 481 | End: 2023-09-13
Attending: ORTHOPAEDIC SURGERY | Admitting: ORTHOPAEDIC SURGERY
Payer: MEDICARE

## 2023-09-09 VITALS
WEIGHT: 160.06 LBS | SYSTOLIC BLOOD PRESSURE: 170 MMHG | DIASTOLIC BLOOD PRESSURE: 67 MMHG | TEMPERATURE: 98 F | RESPIRATION RATE: 20 BRPM | HEIGHT: 64 IN | OXYGEN SATURATION: 98 % | HEART RATE: 53 BPM

## 2023-09-09 LAB
APTT BLD: 27.4 SEC — SIGNIFICANT CHANGE UP (ref 24.5–35.6)
INR BLD: 0.93 RATIO — SIGNIFICANT CHANGE UP (ref 0.85–1.18)
PROTHROM AB SERPL-ACNC: 10.3 SEC — SIGNIFICANT CHANGE UP (ref 9.5–13)

## 2023-09-09 PROCEDURE — 99285 EMERGENCY DEPT VISIT HI MDM: CPT | Mod: GC

## 2023-09-09 RX ORDER — MORPHINE SULFATE 50 MG/1
4 CAPSULE, EXTENDED RELEASE ORAL ONCE
Refills: 0 | Status: DISCONTINUED | OUTPATIENT
Start: 2023-09-09 | End: 2023-09-09

## 2023-09-09 RX ADMIN — MORPHINE SULFATE 4 MILLIGRAM(S): 50 CAPSULE, EXTENDED RELEASE ORAL at 23:12

## 2023-09-09 NOTE — ED ADULT NURSE NOTE - PAIN RATING/NUMBER SCALE (0-10): ACTIVITY
Pt with known COVID here with complaint of \"no wet diaper today\". Pt crying and appropriate for age during triage.
10 (severe pain)

## 2023-09-09 NOTE — ED ADULT NURSE NOTE - NSFALLRISKINTERV_ED_ALL_ED
Assistance OOB with selected safe patient handling equipment if applicable/Assistance with ambulation/Communicate fall risk and risk factors to all staff, patient, and family/Monitor gait and stability/Provide visual cue: yellow wristband, yellow gown, etc/Reinforce activity limits and safety measures with patient and family/Call bell, personal items and telephone in reach/Instruct patient to call for assistance before getting out of bed/chair/stretcher/Non-slip footwear applied when patient is off stretcher/[place] to call system/Physically safe environment - no spills, clutter or unnecessary equipment/Purposeful Proactive Rounding/Room/bathroom lighting operational, light cord in reach

## 2023-09-09 NOTE — ED PROVIDER NOTE - ATTENDING CONTRIBUTION TO CARE
Attending (Jaziel Covarrubias D.O.):  I have personally seen and examined this patient. I have performed a substantive portion of the visit including all aspects of the medical decision making. Resident, fellow, student, and/or ACP note reviewed. I agree on the plan of care except where noted.    see mdm

## 2023-09-09 NOTE — ED PROVIDER NOTE - CLINICAL SUMMARY MEDICAL DECISION MAKING FREE TEXT BOX
Attending (Jaziel Covarrubias D.O.):  83F hx of osteoporosis here s/p mechanical fall. Patient got up from sitting, felt dizzy, which she normally does feel after getting up and fell, landing on right side. Unable to stand after. Sometimes uses a cane. Denies head trauma, neck pain, chest pain, shortness of breath, abd pain. Here, patient is hemodynamically stable, NAD, GCS 15. No signs of basilar skull fx, perrl 4mm, eomi, no nystagmus. Clear lungs, benign abd. Nontender chest wall, stable pelvis. + rle short and externally rotated, no paresthesias. 2+ fem/pop/dp pulses. Patient w/ closed, extremity deformity on exam, neurovasc intact. Eval for fx vs dislocation. Less likely isolated soft tissue injury. Imaging, analgesia. If surgical repair suggested by imaging and serial neurovasc exams -> preop screening labs, T&S, additional imaging as warranted.

## 2023-09-09 NOTE — ED ADULT NURSE NOTE - OBJECTIVE STATEMENT
82 y/o Axox4 F arrived from home brought by ambulance status post unwitnessed fall due to syncopal episode. PMH HTN, HLD. As per EMS, pt fell about an hour ago, told EMS "I felt dizzy then fell onto the floor". As per EMS, pt fell onto R hip endorses 10/10 hip pain and R wrist pain. Pt denies AC/LOC, remembers the fall. Pt placed in C collar by EMS. Pt on CM NSR. Pt son at bedside.    Upon assessment, pt R leg shortened and R foot externally rotated.

## 2023-09-09 NOTE — ED PROVIDER NOTE - OBJECTIVE STATEMENT
82 y/o F PMH osteoporosis, HTN, presenting after a fall. States she was on her phone and lost balance. Did not hit her head or lose conciousness. Hit her right side with right arm pain and right hip/leg pain. No SOB, chest pain, fevers, chills. Not on blood thinners.

## 2023-09-10 DIAGNOSIS — E78.5 HYPERLIPIDEMIA, UNSPECIFIED: ICD-10-CM

## 2023-09-10 DIAGNOSIS — K21.9 GASTRO-ESOPHAGEAL REFLUX DISEASE WITHOUT ESOPHAGITIS: ICD-10-CM

## 2023-09-10 DIAGNOSIS — S72.001A FRACTURE OF UNSPECIFIED PART OF NECK OF RIGHT FEMUR, INITIAL ENCOUNTER FOR CLOSED FRACTURE: ICD-10-CM

## 2023-09-10 DIAGNOSIS — S72.91XA UNSPECIFIED FRACTURE OF RIGHT FEMUR, INITIAL ENCOUNTER FOR CLOSED FRACTURE: ICD-10-CM

## 2023-09-10 DIAGNOSIS — R52 PAIN, UNSPECIFIED: ICD-10-CM

## 2023-09-10 DIAGNOSIS — I10 ESSENTIAL (PRIMARY) HYPERTENSION: ICD-10-CM

## 2023-09-10 LAB
ALBUMIN SERPL ELPH-MCNC: 4 G/DL — SIGNIFICANT CHANGE UP (ref 3.3–5)
ALP SERPL-CCNC: 71 U/L — SIGNIFICANT CHANGE UP (ref 40–120)
ALT FLD-CCNC: 19 U/L — SIGNIFICANT CHANGE UP (ref 10–45)
ANION GAP SERPL CALC-SCNC: 13 MMOL/L — SIGNIFICANT CHANGE UP (ref 5–17)
ANION GAP SERPL CALC-SCNC: 14 MMOL/L — SIGNIFICANT CHANGE UP (ref 5–17)
ANION GAP SERPL CALC-SCNC: 15 MMOL/L — SIGNIFICANT CHANGE UP (ref 5–17)
APTT BLD: 24.7 SEC — SIGNIFICANT CHANGE UP (ref 24.5–35.6)
AST SERPL-CCNC: 24 U/L — SIGNIFICANT CHANGE UP (ref 10–40)
BASOPHILS # BLD AUTO: 0.05 K/UL — SIGNIFICANT CHANGE UP (ref 0–0.2)
BASOPHILS NFR BLD AUTO: 0.6 % — SIGNIFICANT CHANGE UP (ref 0–2)
BILIRUB SERPL-MCNC: 0.2 MG/DL — SIGNIFICANT CHANGE UP (ref 0.2–1.2)
BLD GP AB SCN SERPL QL: NEGATIVE — SIGNIFICANT CHANGE UP
BUN SERPL-MCNC: 15 MG/DL — SIGNIFICANT CHANGE UP (ref 7–23)
BUN SERPL-MCNC: 18 MG/DL — SIGNIFICANT CHANGE UP (ref 7–23)
BUN SERPL-MCNC: 20 MG/DL — SIGNIFICANT CHANGE UP (ref 7–23)
CALCIUM SERPL-MCNC: 8.6 MG/DL — SIGNIFICANT CHANGE UP (ref 8.4–10.5)
CALCIUM SERPL-MCNC: 9.1 MG/DL — SIGNIFICANT CHANGE UP (ref 8.4–10.5)
CALCIUM SERPL-MCNC: 9.2 MG/DL — SIGNIFICANT CHANGE UP (ref 8.4–10.5)
CHLORIDE SERPL-SCNC: 94 MMOL/L — LOW (ref 96–108)
CHLORIDE SERPL-SCNC: 95 MMOL/L — LOW (ref 96–108)
CHLORIDE SERPL-SCNC: 96 MMOL/L — SIGNIFICANT CHANGE UP (ref 96–108)
CO2 SERPL-SCNC: 23 MMOL/L — SIGNIFICANT CHANGE UP (ref 22–31)
CO2 SERPL-SCNC: 26 MMOL/L — SIGNIFICANT CHANGE UP (ref 22–31)
CO2 SERPL-SCNC: 27 MMOL/L — SIGNIFICANT CHANGE UP (ref 22–31)
CREAT SERPL-MCNC: 0.73 MG/DL — SIGNIFICANT CHANGE UP (ref 0.5–1.3)
CREAT SERPL-MCNC: 0.8 MG/DL — SIGNIFICANT CHANGE UP (ref 0.5–1.3)
CREAT SERPL-MCNC: 0.9 MG/DL — SIGNIFICANT CHANGE UP (ref 0.5–1.3)
EGFR: 64 ML/MIN/1.73M2 — SIGNIFICANT CHANGE UP
EGFR: 74 ML/MIN/1.73M2 — SIGNIFICANT CHANGE UP
EGFR: 82 ML/MIN/1.73M2 — SIGNIFICANT CHANGE UP
EOSINOPHIL # BLD AUTO: 0.28 K/UL — SIGNIFICANT CHANGE UP (ref 0–0.5)
EOSINOPHIL NFR BLD AUTO: 3.4 % — SIGNIFICANT CHANGE UP (ref 0–6)
GLUCOSE SERPL-MCNC: 122 MG/DL — HIGH (ref 70–99)
GLUCOSE SERPL-MCNC: 144 MG/DL — HIGH (ref 70–99)
GLUCOSE SERPL-MCNC: 162 MG/DL — HIGH (ref 70–99)
HCT VFR BLD CALC: 35.2 % — SIGNIFICANT CHANGE UP (ref 34.5–45)
HCT VFR BLD CALC: 36.7 % — SIGNIFICANT CHANGE UP (ref 34.5–45)
HCT VFR BLD CALC: 39.4 % — SIGNIFICANT CHANGE UP (ref 34.5–45)
HGB BLD-MCNC: 11.9 G/DL — SIGNIFICANT CHANGE UP (ref 11.5–15.5)
HGB BLD-MCNC: 12.3 G/DL — SIGNIFICANT CHANGE UP (ref 11.5–15.5)
HGB BLD-MCNC: 13.2 G/DL — SIGNIFICANT CHANGE UP (ref 11.5–15.5)
IMM GRANULOCYTES NFR BLD AUTO: 0.4 % — SIGNIFICANT CHANGE UP (ref 0–0.9)
INR BLD: 1.04 RATIO — SIGNIFICANT CHANGE UP (ref 0.85–1.18)
LYMPHOCYTES # BLD AUTO: 2.66 K/UL — SIGNIFICANT CHANGE UP (ref 1–3.3)
LYMPHOCYTES # BLD AUTO: 32.1 % — SIGNIFICANT CHANGE UP (ref 13–44)
MCHC RBC-ENTMCNC: 30.2 PG — SIGNIFICANT CHANGE UP (ref 27–34)
MCHC RBC-ENTMCNC: 30.2 PG — SIGNIFICANT CHANGE UP (ref 27–34)
MCHC RBC-ENTMCNC: 30.5 PG — SIGNIFICANT CHANGE UP (ref 27–34)
MCHC RBC-ENTMCNC: 33.5 GM/DL — SIGNIFICANT CHANGE UP (ref 32–36)
MCHC RBC-ENTMCNC: 33.5 GM/DL — SIGNIFICANT CHANGE UP (ref 32–36)
MCHC RBC-ENTMCNC: 33.8 GM/DL — SIGNIFICANT CHANGE UP (ref 32–36)
MCV RBC AUTO: 90.2 FL — SIGNIFICANT CHANGE UP (ref 80–100)
MCV RBC AUTO: 90.2 FL — SIGNIFICANT CHANGE UP (ref 80–100)
MCV RBC AUTO: 90.3 FL — SIGNIFICANT CHANGE UP (ref 80–100)
MONOCYTES # BLD AUTO: 0.64 K/UL — SIGNIFICANT CHANGE UP (ref 0–0.9)
MONOCYTES NFR BLD AUTO: 7.7 % — SIGNIFICANT CHANGE UP (ref 2–14)
NEUTROPHILS # BLD AUTO: 4.63 K/UL — SIGNIFICANT CHANGE UP (ref 1.8–7.4)
NEUTROPHILS NFR BLD AUTO: 55.8 % — SIGNIFICANT CHANGE UP (ref 43–77)
NRBC # BLD: 0 /100 WBCS — SIGNIFICANT CHANGE UP (ref 0–0)
PLATELET # BLD AUTO: 101 K/UL — LOW (ref 150–400)
PLATELET # BLD AUTO: 127 K/UL — LOW (ref 150–400)
PLATELET # BLD AUTO: 141 K/UL — LOW (ref 150–400)
POTASSIUM SERPL-MCNC: 3.6 MMOL/L — SIGNIFICANT CHANGE UP (ref 3.5–5.3)
POTASSIUM SERPL-MCNC: 3.8 MMOL/L — SIGNIFICANT CHANGE UP (ref 3.5–5.3)
POTASSIUM SERPL-MCNC: 4.4 MMOL/L — SIGNIFICANT CHANGE UP (ref 3.5–5.3)
POTASSIUM SERPL-SCNC: 3.6 MMOL/L — SIGNIFICANT CHANGE UP (ref 3.5–5.3)
POTASSIUM SERPL-SCNC: 3.8 MMOL/L — SIGNIFICANT CHANGE UP (ref 3.5–5.3)
POTASSIUM SERPL-SCNC: 4.4 MMOL/L — SIGNIFICANT CHANGE UP (ref 3.5–5.3)
PROT SERPL-MCNC: 6.8 G/DL — SIGNIFICANT CHANGE UP (ref 6–8.3)
PROTHROM AB SERPL-ACNC: 10.9 SEC — SIGNIFICANT CHANGE UP (ref 9.5–13)
RBC # BLD: 3.9 M/UL — SIGNIFICANT CHANGE UP (ref 3.8–5.2)
RBC # BLD: 4.07 M/UL — SIGNIFICANT CHANGE UP (ref 3.8–5.2)
RBC # BLD: 4.37 M/UL — SIGNIFICANT CHANGE UP (ref 3.8–5.2)
RBC # FLD: 11.9 % — SIGNIFICANT CHANGE UP (ref 10.3–14.5)
RBC # FLD: 12 % — SIGNIFICANT CHANGE UP (ref 10.3–14.5)
RBC # FLD: 12.2 % — SIGNIFICANT CHANGE UP (ref 10.3–14.5)
RH IG SCN BLD-IMP: POSITIVE — SIGNIFICANT CHANGE UP
SODIUM SERPL-SCNC: 133 MMOL/L — LOW (ref 135–145)
SODIUM SERPL-SCNC: 134 MMOL/L — LOW (ref 135–145)
SODIUM SERPL-SCNC: 136 MMOL/L — SIGNIFICANT CHANGE UP (ref 135–145)
WBC # BLD: 10.88 K/UL — HIGH (ref 3.8–10.5)
WBC # BLD: 8.13 K/UL — SIGNIFICANT CHANGE UP (ref 3.8–10.5)
WBC # BLD: 8.29 K/UL — SIGNIFICANT CHANGE UP (ref 3.8–10.5)
WBC # FLD AUTO: 10.88 K/UL — HIGH (ref 3.8–10.5)
WBC # FLD AUTO: 8.13 K/UL — SIGNIFICANT CHANGE UP (ref 3.8–10.5)
WBC # FLD AUTO: 8.29 K/UL — SIGNIFICANT CHANGE UP (ref 3.8–10.5)

## 2023-09-10 PROCEDURE — 73020 X-RAY EXAM OF SHOULDER: CPT | Mod: 26,XE,RT

## 2023-09-10 PROCEDURE — 93010 ELECTROCARDIOGRAM REPORT: CPT

## 2023-09-10 PROCEDURE — 73060 X-RAY EXAM OF HUMERUS: CPT | Mod: 26,RT

## 2023-09-10 PROCEDURE — 73502 X-RAY EXAM HIP UNI 2-3 VIEWS: CPT | Mod: 26,RT

## 2023-09-10 PROCEDURE — 73030 X-RAY EXAM OF SHOULDER: CPT | Mod: 26,RT

## 2023-09-10 PROCEDURE — 73552 X-RAY EXAM OF FEMUR 2/>: CPT | Mod: 26,RT

## 2023-09-10 PROCEDURE — 71045 X-RAY EXAM CHEST 1 VIEW: CPT | Mod: 26

## 2023-09-10 DEVICE — SCREW LOKG 5X37.5MM: Type: IMPLANTABLE DEVICE | Site: RIGHT | Status: FUNCTIONAL

## 2023-09-10 DEVICE — K-WIRE STRYKER 3.2M X 450MM: Type: IMPLANTABLE DEVICE | Site: RIGHT | Status: FUNCTIONAL

## 2023-09-10 DEVICE — SCREW LOKG 5X35MM STRL: Type: IMPLANTABLE DEVICE | Site: RIGHT | Status: FUNCTIONAL

## 2023-09-10 DEVICE — SCREW GAMMA LAG 10.5MMX80MM: Type: IMPLANTABLE DEVICE | Site: RIGHT | Status: FUNCTIONAL

## 2023-09-10 DEVICE — STRYKER TROCHANTERIC NAIL 10MM X 170MM 125 DEGREE: Type: IMPLANTABLE DEVICE | Site: RIGHT | Status: FUNCTIONAL

## 2023-09-10 DEVICE — STRYKER TROCHANTERIC NAIL 11MM X 180MM 125 DEGREE: Type: IMPLANTABLE DEVICE | Site: RIGHT | Status: FUNCTIONAL

## 2023-09-10 RX ORDER — OXYCODONE HYDROCHLORIDE 5 MG/1
2.5 TABLET ORAL EVERY 4 HOURS
Refills: 0 | Status: DISCONTINUED | OUTPATIENT
Start: 2023-09-10 | End: 2023-09-13

## 2023-09-10 RX ORDER — LOSARTAN POTASSIUM 100 MG/1
50 TABLET, FILM COATED ORAL DAILY
Refills: 0 | Status: DISCONTINUED | OUTPATIENT
Start: 2023-09-10 | End: 2023-09-10

## 2023-09-10 RX ORDER — ACETAMINOPHEN 500 MG
1000 TABLET ORAL ONCE
Refills: 0 | Status: COMPLETED | OUTPATIENT
Start: 2023-09-10 | End: 2023-09-10

## 2023-09-10 RX ORDER — LACTOBACILLUS ACIDOPHILUS 100MM CELL
1 CAPSULE ORAL DAILY
Refills: 0 | Status: DISCONTINUED | OUTPATIENT
Start: 2023-09-10 | End: 2023-09-13

## 2023-09-10 RX ORDER — ATORVASTATIN CALCIUM 80 MG/1
40 TABLET, FILM COATED ORAL AT BEDTIME
Refills: 0 | Status: DISCONTINUED | OUTPATIENT
Start: 2023-09-10 | End: 2023-09-10

## 2023-09-10 RX ORDER — SODIUM CHLORIDE 9 MG/ML
1000 INJECTION INTRAMUSCULAR; INTRAVENOUS; SUBCUTANEOUS
Refills: 0 | Status: DISCONTINUED | OUTPATIENT
Start: 2023-09-10 | End: 2023-09-13

## 2023-09-10 RX ORDER — VALSARTAN 80 MG/1
80 TABLET ORAL DAILY
Refills: 0 | Status: DISCONTINUED | OUTPATIENT
Start: 2023-09-10 | End: 2023-09-10

## 2023-09-10 RX ORDER — SODIUM CHLORIDE 9 MG/ML
1000 INJECTION, SOLUTION INTRAVENOUS
Refills: 0 | Status: DISCONTINUED | OUTPATIENT
Start: 2023-09-10 | End: 2023-09-10

## 2023-09-10 RX ORDER — ONDANSETRON 8 MG/1
4 TABLET, FILM COATED ORAL ONCE
Refills: 0 | Status: DISCONTINUED | OUTPATIENT
Start: 2023-09-10 | End: 2023-09-10

## 2023-09-10 RX ORDER — ONDANSETRON 8 MG/1
6 TABLET, FILM COATED ORAL ONCE
Refills: 0 | Status: COMPLETED | OUTPATIENT
Start: 2023-09-10 | End: 2023-09-10

## 2023-09-10 RX ORDER — PANTOPRAZOLE SODIUM 20 MG/1
40 TABLET, DELAYED RELEASE ORAL DAILY
Refills: 0 | Status: DISCONTINUED | OUTPATIENT
Start: 2023-09-10 | End: 2023-09-10

## 2023-09-10 RX ORDER — OXYCODONE HYDROCHLORIDE 5 MG/1
2.5 TABLET ORAL EVERY 4 HOURS
Refills: 0 | Status: DISCONTINUED | OUTPATIENT
Start: 2023-09-10 | End: 2023-09-10

## 2023-09-10 RX ORDER — LACTOBACILLUS ACIDOPHILUS 100MM CELL
1 CAPSULE ORAL DAILY
Refills: 0 | Status: DISCONTINUED | OUTPATIENT
Start: 2023-09-10 | End: 2023-09-10

## 2023-09-10 RX ORDER — SIMETHICONE 80 MG/1
80 TABLET, CHEWABLE ORAL DAILY
Refills: 0 | Status: DISCONTINUED | OUTPATIENT
Start: 2023-09-10 | End: 2023-09-13

## 2023-09-10 RX ORDER — ONDANSETRON 8 MG/1
4 TABLET, FILM COATED ORAL ONCE
Refills: 0 | Status: DISCONTINUED | OUTPATIENT
Start: 2023-09-10 | End: 2023-09-13

## 2023-09-10 RX ORDER — ATORVASTATIN CALCIUM 80 MG/1
40 TABLET, FILM COATED ORAL AT BEDTIME
Refills: 0 | Status: DISCONTINUED | OUTPATIENT
Start: 2023-09-10 | End: 2023-09-11

## 2023-09-10 RX ORDER — MAGNESIUM HYDROXIDE 400 MG/1
30 TABLET, CHEWABLE ORAL DAILY
Refills: 0 | Status: DISCONTINUED | OUTPATIENT
Start: 2023-09-10 | End: 2023-09-13

## 2023-09-10 RX ORDER — SIMETHICONE 80 MG/1
80 TABLET, CHEWABLE ORAL DAILY
Refills: 0 | Status: DISCONTINUED | OUTPATIENT
Start: 2023-09-10 | End: 2023-09-10

## 2023-09-10 RX ORDER — AMLODIPINE BESYLATE 2.5 MG/1
5 TABLET ORAL AT BEDTIME
Refills: 0 | Status: DISCONTINUED | OUTPATIENT
Start: 2023-09-10 | End: 2023-09-11

## 2023-09-10 RX ORDER — HYDROMORPHONE HYDROCHLORIDE 2 MG/ML
0.5 INJECTION INTRAMUSCULAR; INTRAVENOUS; SUBCUTANEOUS ONCE
Refills: 0 | Status: DISCONTINUED | OUTPATIENT
Start: 2023-09-10 | End: 2023-09-10

## 2023-09-10 RX ORDER — PANTOPRAZOLE SODIUM 20 MG/1
40 TABLET, DELAYED RELEASE ORAL
Refills: 0 | Status: DISCONTINUED | OUTPATIENT
Start: 2023-09-10 | End: 2023-09-13

## 2023-09-10 RX ORDER — VALSARTAN 80 MG/1
80 TABLET ORAL DAILY
Refills: 0 | Status: DISCONTINUED | OUTPATIENT
Start: 2023-09-10 | End: 2023-09-13

## 2023-09-10 RX ORDER — POLYETHYLENE GLYCOL 3350 17 G/17G
17 POWDER, FOR SOLUTION ORAL DAILY
Refills: 0 | Status: DISCONTINUED | OUTPATIENT
Start: 2023-09-10 | End: 2023-09-13

## 2023-09-10 RX ORDER — APIXABAN 2.5 MG/1
2.5 TABLET, FILM COATED ORAL EVERY 12 HOURS
Refills: 0 | Status: DISCONTINUED | OUTPATIENT
Start: 2023-09-10 | End: 2023-09-13

## 2023-09-10 RX ORDER — ACETAMINOPHEN 500 MG
975 TABLET ORAL EVERY 8 HOURS
Refills: 0 | Status: DISCONTINUED | OUTPATIENT
Start: 2023-09-10 | End: 2023-09-10

## 2023-09-10 RX ORDER — INFLUENZA VIRUS VACCINE 15; 15; 15; 15 UG/.5ML; UG/.5ML; UG/.5ML; UG/.5ML
0.7 SUSPENSION INTRAMUSCULAR ONCE
Refills: 0 | Status: DISCONTINUED | OUTPATIENT
Start: 2023-09-10 | End: 2023-09-13

## 2023-09-10 RX ORDER — AMLODIPINE BESYLATE 2.5 MG/1
5 TABLET ORAL AT BEDTIME
Refills: 0 | Status: DISCONTINUED | OUTPATIENT
Start: 2023-09-10 | End: 2023-09-10

## 2023-09-10 RX ORDER — OXYCODONE HYDROCHLORIDE 5 MG/1
5 TABLET ORAL EVERY 4 HOURS
Refills: 0 | Status: DISCONTINUED | OUTPATIENT
Start: 2023-09-10 | End: 2023-09-13

## 2023-09-10 RX ORDER — OXYCODONE HYDROCHLORIDE 5 MG/1
5 TABLET ORAL EVERY 4 HOURS
Refills: 0 | Status: DISCONTINUED | OUTPATIENT
Start: 2023-09-10 | End: 2023-09-10

## 2023-09-10 RX ORDER — FENTANYL CITRATE 50 UG/ML
25 INJECTION INTRAVENOUS
Refills: 0 | Status: DISCONTINUED | OUTPATIENT
Start: 2023-09-10 | End: 2023-09-10

## 2023-09-10 RX ORDER — SENNA PLUS 8.6 MG/1
2 TABLET ORAL AT BEDTIME
Refills: 0 | Status: DISCONTINUED | OUTPATIENT
Start: 2023-09-10 | End: 2023-09-13

## 2023-09-10 RX ORDER — ACETAMINOPHEN 500 MG
975 TABLET ORAL EVERY 8 HOURS
Refills: 0 | Status: DISCONTINUED | OUTPATIENT
Start: 2023-09-10 | End: 2023-09-13

## 2023-09-10 RX ORDER — CEFAZOLIN SODIUM 1 G
2000 VIAL (EA) INJECTION EVERY 8 HOURS
Refills: 0 | Status: COMPLETED | OUTPATIENT
Start: 2023-09-10 | End: 2023-09-11

## 2023-09-10 RX ADMIN — ATORVASTATIN CALCIUM 40 MILLIGRAM(S): 80 TABLET, FILM COATED ORAL at 21:48

## 2023-09-10 RX ADMIN — LOSARTAN POTASSIUM 50 MILLIGRAM(S): 100 TABLET, FILM COATED ORAL at 05:11

## 2023-09-10 RX ADMIN — FENTANYL CITRATE 25 MICROGRAM(S): 50 INJECTION INTRAVENOUS at 15:30

## 2023-09-10 RX ADMIN — SODIUM CHLORIDE 75 MILLILITER(S): 9 INJECTION, SOLUTION INTRAVENOUS at 05:11

## 2023-09-10 RX ADMIN — Medication 1 TABLET(S): at 08:02

## 2023-09-10 RX ADMIN — FENTANYL CITRATE 25 MICROGRAM(S): 50 INJECTION INTRAVENOUS at 15:15

## 2023-09-10 RX ADMIN — Medication 975 MILLIGRAM(S): at 22:47

## 2023-09-10 RX ADMIN — AMLODIPINE BESYLATE 5 MILLIGRAM(S): 2.5 TABLET ORAL at 21:47

## 2023-09-10 RX ADMIN — Medication 975 MILLIGRAM(S): at 06:11

## 2023-09-10 RX ADMIN — FENTANYL CITRATE 25 MICROGRAM(S): 50 INJECTION INTRAVENOUS at 15:45

## 2023-09-10 RX ADMIN — ONDANSETRON 6 MILLIGRAM(S): 8 TABLET, FILM COATED ORAL at 09:00

## 2023-09-10 RX ADMIN — Medication 400 MILLIGRAM(S): at 01:04

## 2023-09-10 RX ADMIN — Medication 975 MILLIGRAM(S): at 05:11

## 2023-09-10 RX ADMIN — OXYCODONE HYDROCHLORIDE 5 MILLIGRAM(S): 5 TABLET ORAL at 07:45

## 2023-09-10 RX ADMIN — SENNA PLUS 2 TABLET(S): 8.6 TABLET ORAL at 21:47

## 2023-09-10 RX ADMIN — Medication 975 MILLIGRAM(S): at 21:47

## 2023-09-10 RX ADMIN — Medication 1 TABLET(S): at 21:47

## 2023-09-10 RX ADMIN — SIMETHICONE 80 MILLIGRAM(S): 80 TABLET, CHEWABLE ORAL at 08:02

## 2023-09-10 RX ADMIN — FENTANYL CITRATE 25 MICROGRAM(S): 50 INJECTION INTRAVENOUS at 15:00

## 2023-09-10 RX ADMIN — HYDROMORPHONE HYDROCHLORIDE 0.5 MILLIGRAM(S): 2 INJECTION INTRAMUSCULAR; INTRAVENOUS; SUBCUTANEOUS at 01:03

## 2023-09-10 RX ADMIN — SODIUM CHLORIDE 125 MILLILITER(S): 9 INJECTION INTRAMUSCULAR; INTRAVENOUS; SUBCUTANEOUS at 15:00

## 2023-09-10 RX ADMIN — Medication 100 MILLIGRAM(S): at 21:48

## 2023-09-10 NOTE — PATIENT PROFILE ADULT - MST SCORE
Constitutional: + Fever, - Chills, - Anorexia, - Fatigue, - Night sweats  Eyes: - Discharge, - Irritation, - Redness, - Visual changes, - Light sensitivity, - Pain  EARS: - Ear Pain, - Tinnitus, - Decreased hearing  NOSE: - Congestion, - Bloody nose  MOUTH/THROAT: - Vocal Changes, - Drooling, - Sore throat  NECK: - Lumps, - Stiffness, - Pain  CV: - Palpitations, - Chest Pain, - Edema, - Syncope  RESP:  - Cough, - Shortness of Breath, - Dyspnea on Exertion, - Trouble speaking, - Pleuritic pain - Wheezing  GI: - Diarrhea, - Constipation, - Bloody stools, - Nausea, - Vomiting, - Abdominal Pain  : - Dysuria, -Frequency, - Hematuria, - Hesitancy, - Incontinence, - Saddle Anesthesia, - Abnormal discharge  MSK: - Myalgias, - Arthralgias, - Weakness, - Deformities, - Injuries  SKIN: - Color change, - Rash, - Swelling, - Ecchymosis, - Abrasion, - laceration  NEURO: - Change in behavior, - Dec. Alertness, + Headache, - Dizziness, - Change in speech, - Weakness, - Seizure-like activity, - Difficulty ambulating 0

## 2023-09-10 NOTE — PATIENT PROFILE ADULT - FALL HARM RISK - HARM RISK INTERVENTIONS

## 2023-09-10 NOTE — H&P ADULT - ASSESSMENT
80y/o F w/ R IT Fx s/p MF    Plan for OR Today 9/10/23 for R Hip IMN w/ Dr. Martinez  NPO, IVF while NPO  RLE NWB, Bedrest  Pain control  DVT Ppx: SCDs  Medical management appreciated  Will discuss plan w/ Dr. Martinez and change accordingly.  82y/o F w/ R IT Fx s/p MF    Plan for OR Today 9/10/23 for R Hip IMN w/ Dr. Martinez  NPO, IVF while NPO  RLE NWB, Bedrest  EMILEE NWB, Sling  Pain control  DVT Ppx: SCDs  Medical management appreciated  Will discuss plan w/ Dr. Martinez and change accordingly.

## 2023-09-10 NOTE — H&P ADULT - HISTORY OF PRESENT ILLNESS
82y/o F presents to ED c/o R Hip pain s/p MF. Community ambulator w/o assistance. Pt was walking, tripped and fell onto R Side. Pt noticed immediate pain and inability to ambulate. No HS/LOC. No other injuries or complaints. Not on AC. Denies CP, SOB, fever, chills, numbness/tingling, weakness or any other complaints.  80y/o F presents to ED c/o R Hip pain s/p MF. Community ambulator w/o assistance. Pt was walking, tripped and fell onto R Side. Pt noticed immediate pain and inability to ambulate. No HS/LOC. Pt also notes some mild R Shoulder pain. No other injuries or complaints. Not on AC. Denies CP, SOB, fever, chills, numbness/tingling, weakness or any other complaints.

## 2023-09-10 NOTE — PRE-ANESTHESIA EVALUATION ADULT - WEIGHT IN KG
Family meeting held today.  Mother of patient, uncle of patient, intensivist Dr. Dudley, resident Dr. Wells, Hospice RN Alberta, Hospice SW Amina, and peds LUCA Logan were in attendance.  [employer] used via  iPad to translate into Mandarin (Karma ID [id number]).  Meeting held to provide family with information regarding the option of Hospice home care for Vincenzo.  Hospice RN and SW explained to mother what would be required of family, what resources would be provided, and what they can expect if they decide to pursue Hospice home care.  Hospice services would be completed covered with no out of pocket costs.  Family would be provided with all supplies needed to care for Vincenzo at home, including hospital bed, suction equipment, feeding equipment.  Hospice team explained that family members would be the Vincenzo's caregivers if Hospice was pursued.  They would need to comfortable caring for him 24 hours a day.  There would not be around the clock nursing, as is present in hospital setting currently, to clean, position, feed, and tend to any needs of Vincenzo.  Hospice RN would visit [place] 2x a week to follow up, assist, and answer any questions.  However, Hospice team notified mom that she would need to be comfortable and PICU team needs to be comfortable with mom's training/ability to care for Vincenzo.  They recommended mom (or other family member) stay bedside in hospital with patient for 24 hours to ensure they are able to care for [place] continuously.  They would also need to be comfortable with NG tube.  Mom asked if there is a home aide that can come take care of Vincenzo during the day.  She states that the family has not been working since Vincenzo has been under our care, but that they will need to return to work eventually.  Hospice team reported that it is possible to receive 20 hours of home aide per week (4 hours per weekday) but that a responsible adult will also need to be home.  Hospice team and peds LUCA brought up the option of applying for additional home aide services through pt's insurance ([employer]).  Hospice team also informed mom that family member can apply for funding from pt's insurance so that a designated family member may be paid for taking care of [place] (as opposed to getting an aide through insurance).  Mom questioned how she would give patient medication if needed at home or if he has a fever how would he be treated.  She was informed that it would be via NG tube.  Hospice team informed mom that if she noticed any fever, change in breathing, change in secretions, other worrying sign or symptom, or "anything different" that she can call Hospice and there will be an RN on call around the clock to assist or escalate care to physician/emergency department.    At this time, mom is interesting in learning more about her options for inpatient long term rehab as well.  She was told that as opposed to Hospice home care, if family was to pursue a long term rehab facility for Vincenzo, there would be staff caring for him.  Mom was informed that exact resident to staff member ratio would have to be discussed location by location.  It was discussed that if Vincenzo was to go to a long term rehab facility, Vincenzo's family would be visitors as opposed to primary caretakers.  Mom was notified that visiting hours are often very flexible for parents in these facilities.  Mom notified that most of these facilities will prefer Vincenzo receives nutrition via G-tube as opposed to nasogastric tube.  Mom understood and is considering the G-tube.  She endorses that since Vincenzo is doing better than she had expected post-extubation and may continue to breathe on his own for an extended period of time she is considering G-tube if this is the better long term feeding option.  She reports that she initially wanted to avoid hurting her son further with another procedure.  Intensivist informed mom that most patients who undergo procedure for placement of G-tube recover quickly with minimal pain.  Mom endorsed that she is hesitant to have Vincenzo undergo another procedure in which he will need sedation.      After meeting, mom was provided with a list of possible care facilities in [place] that Vincenzo may be able to go to so that mom may do further research.  SW notified mom that they will also look into facilities in [place] and provide that to mom.      All of mom's current questions and concerns were addressed and she was informed to let the team know when she has more questions.  Mom would like to discuss options with Vincenzo's dad and family before making further decisions. Family meeting held today.  Mother of patient, uncle of patient, intensivist Dr. Dudley, resident Dr. Wells, Hospice RN Alberta, Hospice SW Amina, and peds LUCA Logan were in attendance.  [employer] used via  iPad to translate into Mandarin (Karma ID [id number]).  Meeting held to provide family with information regarding the option of Hospice home care for Vincenzo.  Hospice RN and SW explained to mother what would be required of family, what resources would be provided, and what they can expect if they decide to pursue Hospice home care.  Hospice services would be completed covered with no out of pocket costs.  Family would be provided with all supplies needed to care for Vincenzo at home, including hospital bed, suction equipment, feeding equipment.  Hospice team explained that family members would be the Vincenzo's caregivers if Hospice was pursued.  They would need to comfortable caring for him 24 hours a day.  There would not be around the clock nursing, as is present in hospital setting currently, to clean, position, feed, and tend to any needs of Vincenzo.  Hospice RN would visit [place] 2x a week to follow up, assist, and answer any questions.  However, Hospice team notified mom that she would need to be comfortable and PICU team needs to be comfortable with mom's training/ability to care for Vincenzo.  They recommended mom (or other family member) stay bedside in hospital with patient for 24 hours to ensure they are able to care for [place] continuously.  They would also need to be comfortable with NG tube.  Mom asked if there is a home aide that can come take care of Vincenzo during the day.  She states that the family has not been working since Vincenzo has been under our care, but that they will need to return to work eventually.  Hospice team reported that it is possible to receive 20 hours of home aide per week (4 hours per weekday) but that a responsible adult will also need to be home.  Hospice team and peds LUCA brought up the option of applying for additional home aide services through pt's insurance ([employer]).  Hospice team also informed mom that family member can apply for funding from pt's insurance so that a designated family member may be paid for taking care of [place] (as opposed to getting an aide through insurance).  Mom questioned how she would give patient medication if needed at home or if he has a fever how would he be treated.  She was informed that it would be via NG tube.  Hospice team informed mom that if she noticed any fever, change in breathing, change in secretions, other worrying sign or symptom, or "anything different" that she can call Hospice and there will be an RN on call around the clock to assist or escalate care to physician/emergency department.    At this time, mom is interesting in learning more about her options for inpatient long term rehab as well.  She was told that as opposed to Hospice home care, if family was to pursue a long term rehab facility for Vincenzo, there would be staff caring for him.  Mom was informed that exact resident to staff member ratio would have to be discussed location by location.  It was discussed that if Vincenzo was to go to a long term rehab facility, Vincenzo's family would be visitors as opposed to primary caretakers.  Mom was notified that visiting hours are often very flexible for parents in these facilities.  Mom notified that most of these facilities will prefer Vincenzo receives nutrition via G-tube as opposed to nasogastric tube.  Mom understood and is considering the G-tube.  She endorses that since Vincenzo is doing better than she had expected post-extubation and may continue to breathe on his own for an extended period of time she is considering G-tube if this is the better long term feeding option.  She reports that she initially wanted to avoid hurting her son further with another procedure.  Intensivist informed mom that most patients who undergo procedure for placement of G-tube recover quickly with minimal pain.  Mom endorsed that she is hesitant to have Vincenzo undergo another procedure in which he will need sedation.  Mom was provided with a list of possible care facilities in [place] that Vincenzo may be able to go to so that mom may do further research.  LUCA reported that she put referral in for [facility].  LUCA notified mom that she will also look into facilities in [place] and provide that information to mom.      All of mom's current questions and concerns were addressed and she was informed to let the team know when she has more questions.  Mom would like to discuss options with Vincenzo's dad and family before making further decisions. Family meeting held today.  Mother of patient, uncle of patient, intensivist Dr. Dudley, resident Dr. Wells, Hospice RN Alberta, Hospice SW Amina, and peds LUCA Logan were in attendance.  [employer] used via  iPad to translate into Mandarin (Karma ID [id number]).  Meeting held to provide family with information regarding the option of Hospice home care for Vincenzo.  Hospice RN and SW explained to mother what would be required of family, what resources would be provided, and what they can expect if they decide to pursue Hospice home care.  Hospice services would be completely covered with no out of pocket costs.  Family would be provided with all supplies needed to care for Dema at home, including hospital bed, suction equipment, feeding equipment.  Hospice team explained that family members would be Dema's caregivers if Hospice was pursued.  They would need to be able to care for him 24 hours a day.  There would not be around the clock nursing, as is present in hospital setting currently, to clean, position, feed, and tend to any needs of Dema.  Hospice RN would visit [place] 2x a week to follow up, assist, and answer any questions.  However, Hospice team notified mom that she would need to be comfortable and PICU team needs to be comfortable with mom's training/ability to care for Dema.  They recommended mom (or other family member) stay bedside in hospital with patient for 24 hours to ensure they are able to care for Dema continuously.  They would also need to be comfortable with NG tube.  Mom asked if there is a home aide that can come take care of Dema during the day.  She states that the family has not been working since Vincenzo has been under our care, but that they will need to return to work eventually.  Hospice team reported that it is possible to receive 20 hours of home aide per week (4 hours per weekday) but that a responsible adult will also need to be home.  Hospice team and Peds LUCA brought up the option of applying for additional home aide services through pt's insurance ([employer]).  Hospice team also informed mom that family member can apply for funding from pt's insurance so that a designated family member may be paid for taking care of Vincenzo (as opposed to getting an aide through insurance).  Mom questioned how she would give patient medication if needed at home or if he has a fever how would he be treated.  She was informed that it would be via NG tube.  Hospice team informed mom that if she noticed any fever, change in breathing, change in secretions, other worrying sign or symptom, or "anything different" that she can call Hospice and there will be an RN on call around the clock to assist or escalate care to physician/emergency department.    At this time, mom is interested in learning more about her options for inpatient subacute rehab as well.  She was told that as opposed to Hospice home care, if family was to pursue a long term rehab facility for Vincenzo, there would be staff caring for him.  Mom was informed that exact resident to staff member ratio would have to be discussed location by location.  It was discussed that if Vincenzo was to go to a long term rehab facility, Vincenzo's family would be visitors as opposed to primary caretakers.  Mom was notified that visiting hours are often very flexible for parents in these facilities.  Mom notified that most of these facilities will prefer or require that Vincenzo receives nutrition via G-tube as opposed to nasogastric tube.  Mom understood and is considering the G-tube.  She endorses that since Vincenzo is doing better than she had expected post-extubation and may continue to breathe on his own for an extended period of time she is considering G-tube if this is the better long term feeding option.  She reports that she initially wanted to avoid hurting her son further with another procedure.  Intensivist informed mom that most patients who undergo procedure for placement of G-tube recover quickly with minimal pain.  Mom endorsed that she is hesitant to have Vincenzo undergo another procedure in which he will need anesthesia.  Mom was provided with a list of possible care facilities in [place] that Vincenzo may be able to go to so that mom may do further research.  LUCA reported that she put referral in for [facility].  LUCA notified mom that she will also look into facilities in [place] and provide that information to mom.      All of mom's current questions and concerns were addressed and she was informed to let the team know when she has more questions.  Mom would like to discuss options with Vincenzo's dad and family before making further decisions. 72.6

## 2023-09-10 NOTE — H&P ADULT - NSHPPHYSICALEXAM_GEN_ALL_CORE
RLE:   Skin intact  TTP at hip  Unable to SLR  + Axial Load, + Log roll  + EHL/FHL/GS/TA  SILT  2+ DP  Compartments soft and compressible  No Calf TTP    SECONDARY EXAM: Benign, Skin intact, SILT throughout, motor grossly intact throughout, no other orthopedic injuries at this time, compartments soft and compressible    SPINE: Skin intact, no bony tenderness or step-offs appreciated throughout cervical/thoracic/lumbar/sacral spine    BL UE: Skin intact, no erythema, ecchymosis, edema, gross deformity, NTTP over the bony prominences of the shoulder/elbow/wrist/hand, painless passive/active ROM of the shoulder/elbow/wrist/hand, C5-T1 SILT, motor grossly intact throughout axillary/musculocutaneous/radial/median/ulnar nerves, + radial pulse    LLE: Skin intact, no erythema, ecchymosis, edema, gross deformity, NTTP over the bony prominences of the hip/knee/ankle/foot, painless passive/active ROM of the hip/knee/ankle/foot, L2-S1 SILT, motor grossly intact throughout Hip Flexors/Quadriceps/Hamstrings/TA/EHL/FHL/GSC, + DP/PT pulses, no pain with log roll, no pain on axial loading, compartments soft and compressible, calf nontender RLE:   Skin intact  TTP at hip  Unable to SLR  + Axial Load, + Log roll  + EHL/FHL/GS/TA  SILT  2+ DP  Compartments soft and compressible  No Calf TTP    RUE:   Skin intact  Mild TTP at shoulder  FROM of shoulder  + AIN/PIN/U/M/R/Musc/Ax  SILT   2+ Radial   Compartments soft and compressible    SECONDARY EXAM: Benign, Skin intact, SILT throughout, motor grossly intact throughout, no other orthopedic injuries at this time, compartments soft and compressible    SPINE: Skin intact, no bony tenderness or step-offs appreciated throughout cervical/thoracic/lumbar/sacral spine    LUE: Skin intact, no erythema, ecchymosis, edema, gross deformity, NTTP over the bony prominences of the shoulder/elbow/wrist/hand, painless passive/active ROM of the shoulder/elbow/wrist/hand, C5-T1 SILT, motor grossly intact throughout axillary/musculocutaneous/radial/median/ulnar nerves, + radial pulse    LLE: Skin intact, no erythema, ecchymosis, edema, gross deformity, NTTP over the bony prominences of the hip/knee/ankle/foot, painless passive/active ROM of the hip/knee/ankle/foot, L2-S1 SILT, motor grossly intact throughout Hip Flexors/Quadriceps/Hamstrings/TA/EHL/FHL/GSC, + DP/PT pulses, no pain with log roll, no pain on axial loading, compartments soft and compressible, calf nontender

## 2023-09-11 ENCOUNTER — TRANSCRIPTION ENCOUNTER (OUTPATIENT)
Age: 83
End: 2023-09-11

## 2023-09-11 LAB
ANION GAP SERPL CALC-SCNC: 13 MMOL/L — SIGNIFICANT CHANGE UP (ref 5–17)
BUN SERPL-MCNC: 24 MG/DL — HIGH (ref 7–23)
CALCIUM SERPL-MCNC: 8 MG/DL — LOW (ref 8.4–10.5)
CHLORIDE SERPL-SCNC: 97 MMOL/L — SIGNIFICANT CHANGE UP (ref 96–108)
CO2 SERPL-SCNC: 24 MMOL/L — SIGNIFICANT CHANGE UP (ref 22–31)
CREAT SERPL-MCNC: 0.98 MG/DL — SIGNIFICANT CHANGE UP (ref 0.5–1.3)
EGFR: 58 ML/MIN/1.73M2 — LOW
GLUCOSE SERPL-MCNC: 150 MG/DL — HIGH (ref 70–99)
HCT VFR BLD CALC: 29.7 % — LOW (ref 34.5–45)
HGB BLD-MCNC: 9.8 G/DL — LOW (ref 11.5–15.5)
MCHC RBC-ENTMCNC: 30.2 PG — SIGNIFICANT CHANGE UP (ref 27–34)
MCHC RBC-ENTMCNC: 33 GM/DL — SIGNIFICANT CHANGE UP (ref 32–36)
MCV RBC AUTO: 91.7 FL — SIGNIFICANT CHANGE UP (ref 80–100)
NRBC # BLD: 0 /100 WBCS — SIGNIFICANT CHANGE UP (ref 0–0)
PLATELET # BLD AUTO: 98 K/UL — LOW (ref 150–400)
POTASSIUM SERPL-MCNC: 3.8 MMOL/L — SIGNIFICANT CHANGE UP (ref 3.5–5.3)
POTASSIUM SERPL-SCNC: 3.8 MMOL/L — SIGNIFICANT CHANGE UP (ref 3.5–5.3)
RBC # BLD: 3.24 M/UL — LOW (ref 3.8–5.2)
RBC # FLD: 12.4 % — SIGNIFICANT CHANGE UP (ref 10.3–14.5)
SODIUM SERPL-SCNC: 134 MMOL/L — LOW (ref 135–145)
WBC # BLD: 8.99 K/UL — SIGNIFICANT CHANGE UP (ref 3.8–10.5)
WBC # FLD AUTO: 8.99 K/UL — SIGNIFICANT CHANGE UP (ref 3.8–10.5)

## 2023-09-11 RX ORDER — SENNA PLUS 8.6 MG/1
2 TABLET ORAL
Qty: 0 | Refills: 0 | DISCHARGE
Start: 2023-09-11

## 2023-09-11 RX ORDER — APIXABAN 2.5 MG/1
1 TABLET, FILM COATED ORAL
Qty: 0 | Refills: 0 | DISCHARGE
Start: 2023-09-11

## 2023-09-11 RX ORDER — ACETAMINOPHEN 500 MG
3 TABLET ORAL
Qty: 0 | Refills: 0 | DISCHARGE
Start: 2023-09-11

## 2023-09-11 RX ORDER — POLYETHYLENE GLYCOL 3350 17 G/17G
17 POWDER, FOR SOLUTION ORAL
Qty: 0 | Refills: 0 | DISCHARGE
Start: 2023-09-11

## 2023-09-11 RX ORDER — CHOLECALCIFEROL (VITAMIN D3) 125 MCG
2000 CAPSULE ORAL DAILY
Refills: 0 | Status: DISCONTINUED | OUTPATIENT
Start: 2023-09-11 | End: 2023-09-13

## 2023-09-11 RX ORDER — ROSUVASTATIN CALCIUM 5 MG/1
10 TABLET ORAL AT BEDTIME
Refills: 0 | Status: DISCONTINUED | OUTPATIENT
Start: 2023-09-11 | End: 2023-09-13

## 2023-09-11 RX ADMIN — APIXABAN 2.5 MILLIGRAM(S): 2.5 TABLET, FILM COATED ORAL at 11:44

## 2023-09-11 RX ADMIN — Medication 975 MILLIGRAM(S): at 06:02

## 2023-09-11 RX ADMIN — Medication 975 MILLIGRAM(S): at 05:02

## 2023-09-11 RX ADMIN — Medication 975 MILLIGRAM(S): at 14:08

## 2023-09-11 RX ADMIN — ROSUVASTATIN CALCIUM 10 MILLIGRAM(S): 5 TABLET ORAL at 21:53

## 2023-09-11 RX ADMIN — APIXABAN 2.5 MILLIGRAM(S): 2.5 TABLET, FILM COATED ORAL at 00:03

## 2023-09-11 RX ADMIN — VALSARTAN 80 MILLIGRAM(S): 80 TABLET ORAL at 09:50

## 2023-09-11 RX ADMIN — SENNA PLUS 2 TABLET(S): 8.6 TABLET ORAL at 21:53

## 2023-09-11 RX ADMIN — APIXABAN 2.5 MILLIGRAM(S): 2.5 TABLET, FILM COATED ORAL at 22:07

## 2023-09-11 RX ADMIN — Medication 1 TABLET(S): at 11:44

## 2023-09-11 RX ADMIN — POLYETHYLENE GLYCOL 3350 17 GRAM(S): 17 POWDER, FOR SOLUTION ORAL at 11:45

## 2023-09-11 RX ADMIN — Medication 975 MILLIGRAM(S): at 22:53

## 2023-09-11 RX ADMIN — Medication 100 MILLIGRAM(S): at 04:28

## 2023-09-11 RX ADMIN — OXYCODONE HYDROCHLORIDE 5 MILLIGRAM(S): 5 TABLET ORAL at 17:17

## 2023-09-11 RX ADMIN — PANTOPRAZOLE SODIUM 40 MILLIGRAM(S): 20 TABLET, DELAYED RELEASE ORAL at 05:02

## 2023-09-11 RX ADMIN — Medication 975 MILLIGRAM(S): at 21:53

## 2023-09-11 RX ADMIN — Medication 975 MILLIGRAM(S): at 14:38

## 2023-09-11 NOTE — OCCUPATIONAL THERAPY INITIAL EVALUATION ADULT - ADL RETRAINING, OT EVAL
Pt will perform toileting with independence within 2 weeks. Pt will perform grooming while standing with independence within 2 weeks.

## 2023-09-11 NOTE — DISCHARGE NOTE PROVIDER - NSDCMRMEDTOKEN_GEN_ALL_CORE_FT
amoxicillin-clavulanate 875 mg-125 mg oral tablet: 1 tab(s) orally 2 times a day   aspirin 81 mg oral delayed release tablet: 1 tab(s) orally once a day  cephalexin 500 mg oral tablet: 1 tab(s) orally 2 times a day  Crestor: 10  mg orally once a day (at bedtime)  MiraLax oral powder for reconstitution: 1 orally once a day, As Needed -for constipation  Multiple Vitamins oral tablet: 1 tab(s) orally once a day  Protonix: 40  mg orally once a day  valsartan-hydrochlorothiazide 80 mg-12.5 mg oral tablet: 1 tab(s) orally once a day   acetaminophen 325 mg oral tablet: 3 tab(s) orally every 8 hours  apixaban 2.5 mg oral tablet: 1 tab(s) orally every 12 hours  aspirin 81 mg oral delayed release tablet: 1 tab(s) orally once a day  cephalexin 500 mg oral tablet: 1 tab(s) orally 2 times a day  cholecalciferol oral tablet: 2000 unit(s) orally once a day  Crestor: 10  mg orally once a day (at bedtime)  lactobacillus acidophilus oral capsule: 1 cap(s) orally once a day  Multiple Vitamins oral tablet: 1 tab(s) orally once a day  oxyCODONE 5 mg oral tablet: 0.5 tab(s) orally every 4 hours as needed for  moderate pain (4 -6) ; 1 tab orally every 4 hours as needed for severe pain (7 - 10)  polyethylene glycol 3350 oral powder for reconstitution: 17 gram(s) orally once a day  Protonix: 40  mg orally once a day  senna leaf extract oral tablet: 2 tab(s) orally once a day (at bedtime)  simethicone 80 mg oral tablet, chewable: 1 tab(s) orally once a day As needed Indigestion  valsartan 80 mg oral tablet: 1 tab(s) orally once a day Hold if SBP&lt;110, HR&lt;60   acetaminophen 325 mg oral tablet: 3 tab(s) orally every 8 hours  apixaban 2.5 mg oral tablet: 1 tab(s) orally every 12 hours  cholecalciferol oral tablet: 2000 unit(s) orally once a day  Crestor: 10  mg orally once a day (at bedtime)  lactobacillus acidophilus oral capsule: 1 cap(s) orally once a day  Multiple Vitamins oral tablet: 1 tab(s) orally once a day  oxyCODONE 5 mg oral tablet: 0.5 tab(s) orally every 4 hours as needed for  moderate pain (4 -6) ; 1 tab orally every 4 hours as needed for severe pain (7 - 10)  polyethylene glycol 3350 oral powder for reconstitution: 17 gram(s) orally once a day  Protonix: 40  mg orally once a day  senna leaf extract oral tablet: 2 tab(s) orally once a day (at bedtime)  simethicone 80 mg oral tablet, chewable: 1 tab(s) orally once a day As needed Indigestion  valsartan 80 mg oral tablet: 1 tab(s) orally once a day Hold if SBP&lt;110, HR&lt;60   acetaminophen 325 mg oral tablet: 3 tab(s) orally every 8 hours  apixaban 2.5 mg oral tablet: 1 tab(s) orally every 12 hours  cholecalciferol oral tablet: 2000 unit(s) orally once a day  Crestor: 10  mg orally once a day (at bedtime)  Diovan HCT 80 mg-12.5 mg oral tablet: 1 tab(s) orally once a day This combination Valsartan/HCTZ medication to resume after discharge from Sub Acute Rehab.  Only take Valsartan while in Rehab.  lactobacillus acidophilus oral capsule: 1 cap(s) orally once a day  Multiple Vitamins oral tablet: 1 tab(s) orally once a day  oxyCODONE 5 mg oral tablet: 0.5 tab(s) orally every 4 hours as needed for  moderate pain (4 -6) ; 1 tab orally every 4 hours as needed for severe pain (7 - 10)  polyethylene glycol 3350 oral powder for reconstitution: 17 gram(s) orally once a day  Protonix: 40  mg orally once a day  senna leaf extract oral tablet: 2 tab(s) orally once a day (at bedtime)  simethicone 80 mg oral tablet, chewable: 1 tab(s) orally once a day As needed Indigestion  valsartan 80 mg oral tablet: 1 tab(s) orally once a day Hold if SBP&lt;110, HR&lt;60

## 2023-09-11 NOTE — PHYSICAL THERAPY INITIAL EVALUATION ADULT - ADDITIONAL COMMENTS
Pt lives with son in a private house with 1 step to enter, 1 flight of stairs +handrail to second floor bedroom and bathroom. Prior to admission, pt was grossly independent with functional mobility, occasionally used a quad cane however "too heavy to lift" per son/pt. Pt occasionally needed help with ADLs from son.

## 2023-09-11 NOTE — PHYSICAL THERAPY INITIAL EVALUATION ADULT - IMPAIRED TRANSFERS: SIT/STAND, REHAB EVAL
impaired balance/decreased flexibility/narrow base of support/pain/impaired postural control/decreased ROM/impaired sensory feedback/decreased strength

## 2023-09-11 NOTE — DISCHARGE NOTE PROVIDER - NSDCCPTREATMENT_GEN_ALL_CORE_FT
PRINCIPAL PROCEDURE  Procedure: ORIF, hip, with intramedullary martín  Findings and Treatment: Right hip      SECONDARY PROCEDURE  Procedure: Closed treatment of fracture of proximal humerus without manipulation  Findings and Treatment:

## 2023-09-11 NOTE — PHYSICAL THERAPY INITIAL EVALUATION ADULT - ACTIVE RANGE OF MOTION EXAMINATION, REHAB EVAL
except R shoulder not tested 2* sling/bilateral upper extremity Active ROM was WFL (within functional limits)/bilateral  lower extremity Active ROM was WFL (within functional limits)

## 2023-09-11 NOTE — DISCHARGE NOTE PROVIDER - NSDCFUADDAPPT_GEN_ALL_CORE_FT
FOLLOW-UP:  1. Follow-up with Dr. Martinez within 2-3 weeks post discharge.   2. Please follow up with your primary care physician within 2 weeks regarding your hospitalization. Call his/her office to make an appointment.

## 2023-09-11 NOTE — DISCHARGE NOTE PROVIDER - NSDCFUSCHEDAPPT_GEN_ALL_CORE_FT
Matthew Concepcion  [facility]  ENDOCRIN [address] [facility]  Scheduled Appointment: 11/07/2023    [address]  [place] [facility]  OTOLARYNG [address] [facility]  Scheduled Appointment: 11/27/2023

## 2023-09-11 NOTE — DISCHARGE NOTE PROVIDER - NSDCFUADDINST_GEN_ALL_CORE_FT
DEEP VEIN THROMBOSIS PROPHYLAXIS: Please take Eliquis 2.5 mg BID twice per day as prevention for blood clots for 4 weeks (prescription sent to pharmacy).  WOUND CARE: Keep dressings to right hip clean dry and intact. Remove dressings on POD #14 (and staples if applicable), and replace with steri stips.   ACTIVITY: Your weight bearing status is non-weightbearing on right upper extremity in Sling, and weight bearing as tolerated on the right lower extremity.   DIET: as above  NOTIFY YOUR SURGEON IF: You have any bleeding that does not stop, any pus draining from your wound, any fever (over 100.4 F) or chills, persistent nausea/vomiting, persistent diarrhea, or if your pain is not controlled on your discharge pain medications.  FOLLOW-UP:  1. Follow-up with Dr. Martinez within 2-3 weeks post discharge.   2. Please follow up with your primary care physician within 2 weeks regarding your hospitalization. Call his/her office to make an appointment.  DEEP VEIN THROMBOSIS PROPHYLAXIS: Please take Eliquis 2.5 mg BID twice per day as prevention for blood clots for 4 weeks (prescription sent to pharmacy).  WOUND CARE: Keep dressings to right hip clean dry and intact. Remove dressings on POD #14 (and staples if applicable), and replace with steri stips.   ACTIVITY: Your weight bearing status is non-weightbearing on right upper extremity in Sling, and weight bearing as tolerated on the right lower extremity.   DIET: as above  NOTIFY YOUR SURGEON IF: You have any bleeding that does not stop, any pus draining from your wound, any fever (over 100.4 F) or chills, persistent nausea/vomiting, persistent diarrhea, or if your pain is not controlled on your discharge pain medications.  Hold combination Valsartan/HCTZ medication while in rehab and just resume Valsartan.  May continue combination medication when discharged from rehab.

## 2023-09-11 NOTE — PROGRESS NOTE ADULT - ASSESSMENT
Pt is a 84 y/o Female who presented with Right Proximal humerus fracture and Right  IT fracture sp Left hip IMN on 9/10/23 . Patient is hemodynamically stable; recovering well from orthopaedic standpoint.  -    Multimodal Pain control  -    DVT ppx: Eliquis  -    Resumed home meds  -    Check AM labs  -    Weight bearing status: WBAT RLE, NWB RUE in sling  -    PT/OT  -    Dispo:  TBD      Orthopaedic Surgery  [facility] [id number]  [facility]        [id number]  [facility]  [id number]/ [phone]

## 2023-09-11 NOTE — PHYSICAL THERAPY INITIAL EVALUATION ADULT - NSPTDISCHREC_GEN_A_CORE
for progressing activity tolerance, balance, gait, and strength training to improve functional mobility and return pt to PLOF/Sub-acute Rehab

## 2023-09-11 NOTE — PHYSICAL THERAPY INITIAL EVALUATION ADULT - PERTINENT HX OF CURRENT PROBLEM, REHAB EVAL
82 y/o F PMH osteoporosis, HTN, presenting after a fall. States she was on her phone and lost balance. Did not hit her head or lose conciousness. Hit her right side with right arm pain and right hip/leg pain. No SOB, chest pain, fevers, chills. Not on blood thinners. Patient was brought to [facility] for further evaluation and treatment. In the ED she was found to have a right hip fracture and is scheduled for an Open reduction and internal fixation of the right hip. Patient seen now resting comfortably   CXR: (-). Xray Femur R: Acute transverse intertrochanteric right femoral fracture with mild displacement of the superior segment there is angulation. Soft tissue swelling adjacent to the fracture. Partially visualized right knee shows significant joint space narrowing between the medial femoral and tibial plateau. Unremarkable remaining imaged pelvic osseous structures. Xrat Humerus R: There is severe rotator cuff arthropathy. Humeral head is high riding contacting the undersurface of the acromion. There is severe, clavicular joint arthrosis. There is well-corticated ossific density along the posterior lateral margin of the humeral head which may be related to prior injury. There is a possible acute fracture involving the inferolateral osteophyte along the humeral head. 84 y/o F PMH osteoporosis, HTN, presenting after a fall. States she was on her phone and lost balance. Did not hit her head or lose consciousness. Hit her R side with R arm pain and R hip/leg pain. No SOB, chest pain, fevers, chills. Not on blood thinners. In the ED she was found to have a R hip fracture and is scheduled for an ORIF of the R hip.    CXR: (-). Xray Femur R: Acute transverse intertrochanteric R femoral fracture with mild displacement of the superior segment there is angulation. Soft tissue swelling adjacent to the fracture. Partially visualized R knee shows significant joint space narrowing between the medial femoral and tibial plateau. Unremarkable remaining imaged pelvic osseous structures. Xrat Humerus R: Severe rotator cuff arthropathy. Humeral head is high riding contacting the undersurface of the acromion. Severe, clavicular joint arthrosis. Well-corticated ossific density along the posterior lateral margin of the humeral head which may be related to prior injury. There is a possible acute fracture involving the inferolateral osteophyte along the humeral head.

## 2023-09-11 NOTE — DISCHARGE NOTE PROVIDER - CARE PROVIDER_API CALL
Joel Martinez  Orthopaedic Surgery  [address] [place] [address], [facility] [address]  [place] [address]  Phone: [phone]  Fax: [phone]  Established Patient  Follow Up Time: 2 weeks

## 2023-09-11 NOTE — OCCUPATIONAL THERAPY INITIAL EVALUATION ADULT - PERTINENT HX OF CURRENT PROBLEM, REHAB EVAL
Patient is an 83 female h/o htn, chol, gerd, thrombocytopenia, s/p fall with right hip fx and right humeral fx. Pt now s/p R hip IMN on 9/10.

## 2023-09-11 NOTE — DISCHARGE NOTE PROVIDER - NSDCCPCAREPLAN_GEN_ALL_CORE_FT
PRINCIPAL DISCHARGE DIAGNOSIS  Diagnosis: Hip fracture, right  Assessment and Plan of Treatment:       SECONDARY DISCHARGE DIAGNOSES  Diagnosis: Other displaced fracture of proximal end of right humerus  Assessment and Plan of Treatment:      PRINCIPAL DISCHARGE DIAGNOSIS  Diagnosis: Hip fracture, right  Assessment and Plan of Treatment:       SECONDARY DISCHARGE DIAGNOSES  Diagnosis: Other displaced fracture of proximal end of right humerus  Assessment and Plan of Treatment:     Diagnosis: Acute hyponatremia  Assessment and Plan of Treatment:

## 2023-09-11 NOTE — DISCHARGE NOTE PROVIDER - HOSPITAL COURSE
History of Present Illness:  82 y/o F PMH osteoporosis, HTN, presenting after a fall. States she was on her phone and lost balance. Did not hit her head or lose consciousness. Hit her right side with right arm pain and right hip/leg pain. No SOB, chest pain, fevers, chills. Not on blood thinners. Patient was brought to [facility] for further evaluation and treatment. In the ED she was found to have a right hip fracture and is scheduled for an Open reduction and internal fixation of the right hip. Also found to have a right proximal humerus fracture as well.     PAST MEDICAL & SURGICAL HISTORY:  HTN (hypertension)  Hypercholesteremia  GERD (gastroesophageal reflux disease)  Arthritis  Thrombocytopenia  Cataract extraction B/L    Hospital Course:  Patient admitted to [facility] for surgical management of a right hip fracture. Patient with a right proximal humerus fracture treated nonoperatively.   Following medical clearance and optimization, patient underwent an uncomplicated Right hip fracture ORIF with IMN with Dr. Martinez. Patient tolerated the procedure well and was transferred to the recovery room in stable condition, with a stable neuro / vascular exam of the operated extremity.    Patient was placed on Eliquis 2.5mg BID for DVT ppx, and was placed on Protonix for GI protection.   Patient was made weight bearing as tolerated with the RLE. Patient made NWB on the right UE in sling.      Patient evaluated by PT/OT and recommended for disposition for subacute rehab    Discharge and Orthopedic Care instructions were delineated in the Discharge Plan and reviewed with the patient. All medications were delineated in the medication reconciliation tool and key points were reviewed with the patient. They were deemed stable from an Orthopedic & medical standpoint for discharge on XXXX. History of Present Illness:  84 y/o F PMH osteoporosis, HTN, presenting after a fall. States she was on her phone and lost balance. Did not hit her head or lose consciousness. Hit her right side with right arm pain and right hip/leg pain. No SOB, chest pain, fevers, chills. Not on blood thinners. Patient was brought to [facility] for further evaluation and treatment. In the ED she was found to have a right hip fracture and is scheduled for an Open reduction and internal fixation of the right hip. Also found to have a right proximal humerus fracture as well.     PAST MEDICAL & SURGICAL HISTORY:  HTN (hypertension)  Hypercholesteremia  GERD (gastroesophageal reflux disease)  Arthritis  Thrombocytopenia  Cataract extraction B/L    Hospital Course:  Patient admitted to [facility] for surgical management of a right hip fracture. Patient with a right proximal humerus fracture treated nonoperatively.   Following medical clearance and optimization, patient underwent an uncomplicated Right hip fracture ORIF with IMN with Dr. Martinez. Patient tolerated the procedure well and was transferred to the recovery room in stable condition, with a stable neuro / vascular exam of the operated extremity.    Patient was placed on Eliquis 2.5mg BID for DVT ppx, and was placed on Protonix for GI protection.   Patient was made weight bearing as tolerated with the RLE. Patient made NWB on the right UE in sling.      Patient evaluated by PT/OT and recommended for disposition for subacute rehab    9/12: Patient with hyponatremia (Na: 128). Renal consult called. HCTZ held at this time. Recs: [id number]    Discharge and Orthopedic Care instructions were delineated in the Discharge Plan and reviewed with the patient. All medications were delineated in the medication reconciliation tool and key points were reviewed with the patient. They were deemed stable from an Orthopedic & medical standpoint for discharge on XXXX. History of Present Illness:  82 y/o F PMH osteoporosis, HTN, presenting after a fall. States she was on her phone and lost balance. Did not hit her head or lose consciousness. Hit her right side with right arm pain and right hip/leg pain. No SOB, chest pain, fevers, chills. Not on blood thinners. Patient was brought to [facility] for further evaluation and treatment. In the ED she was found to have a right hip fracture and is scheduled for an Open reduction and internal fixation of the right hip. Also found to have a right proximal humerus fracture as well.     PAST MEDICAL & SURGICAL HISTORY:  HTN (hypertension)  Hypercholesteremia  GERD (gastroesophageal reflux disease)  Arthritis  Thrombocytopenia  Cataract extraction B/L    Hospital Course:  Patient admitted to [facility] for surgical management of a right hip fracture. Patient with a right proximal humerus fracture treated nonoperatively.   Following medical clearance and optimization, patient underwent an uncomplicated Right hip fracture ORIF with IMN with Dr. Martinez. Patient tolerated the procedure well and was transferred to the recovery room in stable condition, with a stable neuro / vascular exam of the operated extremity.    Patient was placed on Eliquis 2.5mg BID for DVT ppx, and was placed on Protonix for GI protection.   Patient was made weight bearing as tolerated with the RLE. Patient made NWB on the right UE in sling.      Patient evaluated by PT/OT and recommended for disposition for subacute rehab    9/12: Patient with hyponatremia (Na: 128). Renal consult called. HCTZ held at this time. NA improved following.  Final recs: XXXX    Discharge and Orthopedic Care instructions were delineated in the Discharge Plan and reviewed with the patient. All medications were delineated in the medication reconciliation tool and key points were reviewed with the patient. They were deemed stable from an Orthopedic & medical standpoint for discharge on XXXX. History of Present Illness:  84 y/o F PMH osteoporosis, HTN, presenting after a fall. States she was on her phone and lost balance. Did not hit her head or lose consciousness. Hit her right side with right arm pain and right hip/leg pain. No SOB, chest pain, fevers, chills. Not on blood thinners. Patient was brought to [facility] for further evaluation and treatment. In the ED she was found to have a right hip fracture and is scheduled for an Open reduction and internal fixation of the right hip. Also found to have a right proximal humerus fracture as well.     PAST MEDICAL & SURGICAL HISTORY:  HTN (hypertension)  Hypercholesteremia  GERD (gastroesophageal reflux disease)  Arthritis  Thrombocytopenia  Cataract extraction B/L    Hospital Course:  Patient admitted to [facility] for surgical management of a right hip fracture. Patient with a right proximal humerus fracture treated nonoperatively.   Following medical clearance and optimization, patient underwent an uncomplicated Right hip fracture ORIF with IMN with Dr. Martinez. Patient tolerated the procedure well and was transferred to the recovery room in stable condition, with a stable neuro / vascular exam of the operated extremity.    Patient was placed on Eliquis 2.5mg BID for DVT ppx, and was placed on Protonix for GI protection.   Patient was made weight bearing as tolerated with the RLE. Patient made NWB on the right UE in sling.      Patient evaluated by PT/OT and recommended for disposition for subacute rehab    9/12: Patient with hyponatremia (Na: 128). Renal consult called. HCTZ held at this time. NA improved following.  Final recs: Hold HCTZ while in rehab and may continue when discharged home from Rehab.    Discharge and Orthopedic Care instructions were delineated in the Discharge Plan and reviewed with the patient. All medications were delineated in the medication reconciliation tool and key points were reviewed with the patient. They were deemed stable from an Orthopedic & medical standpoint for discharge on 9/13/23.

## 2023-09-11 NOTE — PHYSICAL THERAPY INITIAL EVALUATION ADULT - GENERAL OBSERVATIONS, REHAB EVAL
Pt received semi-supine in recliner chair in NAD, VSS, +PIVlocked, +purewick, A&O x4, Farsi/English-speaking, son at bedside to translate.

## 2023-09-12 LAB
ANION GAP SERPL CALC-SCNC: 11 MMOL/L — SIGNIFICANT CHANGE UP (ref 5–17)
APPEARANCE UR: CLEAR — SIGNIFICANT CHANGE UP
BILIRUB UR-MCNC: NEGATIVE — SIGNIFICANT CHANGE UP
BUN SERPL-MCNC: 23 MG/DL — SIGNIFICANT CHANGE UP (ref 7–23)
CALCIUM SERPL-MCNC: 7.9 MG/DL — LOW (ref 8.4–10.5)
CHLORIDE SERPL-SCNC: 94 MMOL/L — LOW (ref 96–108)
CO2 SERPL-SCNC: 23 MMOL/L — SIGNIFICANT CHANGE UP (ref 22–31)
COLOR SPEC: COLORLESS — SIGNIFICANT CHANGE UP
CREAT SERPL-MCNC: 0.81 MG/DL — SIGNIFICANT CHANGE UP (ref 0.5–1.3)
DIFF PNL FLD: NEGATIVE — SIGNIFICANT CHANGE UP
EGFR: 72 ML/MIN/1.73M2 — SIGNIFICANT CHANGE UP
GLUCOSE SERPL-MCNC: 118 MG/DL — HIGH (ref 70–99)
GLUCOSE UR QL: NEGATIVE — SIGNIFICANT CHANGE UP
HCT VFR BLD CALC: 30.4 % — LOW (ref 34.5–45)
HGB BLD-MCNC: 10.2 G/DL — LOW (ref 11.5–15.5)
KETONES UR-MCNC: NEGATIVE — SIGNIFICANT CHANGE UP
LEUKOCYTE ESTERASE UR-ACNC: NEGATIVE — SIGNIFICANT CHANGE UP
MCHC RBC-ENTMCNC: 30.6 PG — SIGNIFICANT CHANGE UP (ref 27–34)
MCHC RBC-ENTMCNC: 33.6 GM/DL — SIGNIFICANT CHANGE UP (ref 32–36)
MCV RBC AUTO: 91.3 FL — SIGNIFICANT CHANGE UP (ref 80–100)
NITRITE UR-MCNC: NEGATIVE — SIGNIFICANT CHANGE UP
NRBC # BLD: 0 /100 WBCS — SIGNIFICANT CHANGE UP (ref 0–0)
OSMOLALITY UR: 157 MOS/KG — LOW (ref 300–900)
PH UR: 7.5 — SIGNIFICANT CHANGE UP (ref 5–8)
PLATELET # BLD AUTO: 102 K/UL — LOW (ref 150–400)
POTASSIUM SERPL-MCNC: 4.1 MMOL/L — SIGNIFICANT CHANGE UP (ref 3.5–5.3)
POTASSIUM SERPL-SCNC: 4.1 MMOL/L — SIGNIFICANT CHANGE UP (ref 3.5–5.3)
PROT UR-MCNC: NEGATIVE — SIGNIFICANT CHANGE UP
RBC # BLD: 3.33 M/UL — LOW (ref 3.8–5.2)
RBC # FLD: 12.5 % — SIGNIFICANT CHANGE UP (ref 10.3–14.5)
SODIUM SERPL-SCNC: 128 MMOL/L — LOW (ref 135–145)
SP GR SPEC: 1.01 — LOW (ref 1.01–1.02)
URATE SERPL-MCNC: 4.2 MG/DL — SIGNIFICANT CHANGE UP (ref 2.5–7)
UROBILINOGEN FLD QL: NEGATIVE — SIGNIFICANT CHANGE UP
WBC # BLD: 8.65 K/UL — SIGNIFICANT CHANGE UP (ref 3.8–10.5)
WBC # FLD AUTO: 8.65 K/UL — SIGNIFICANT CHANGE UP (ref 3.8–10.5)

## 2023-09-12 RX ORDER — SIMETHICONE 80 MG/1
1 TABLET, CHEWABLE ORAL
Qty: 0 | Refills: 0 | DISCHARGE
Start: 2023-09-12

## 2023-09-12 RX ORDER — LACTOBACILLUS ACIDOPHILUS 100MM CELL
1 CAPSULE ORAL
Qty: 0 | Refills: 0 | DISCHARGE
Start: 2023-09-12

## 2023-09-12 RX ORDER — VALSARTAN 80 MG/1
1 TABLET ORAL
Qty: 0 | Refills: 0 | DISCHARGE
Start: 2023-09-12

## 2023-09-12 RX ORDER — CHOLECALCIFEROL (VITAMIN D3) 125 MCG
2000 CAPSULE ORAL
Qty: 0 | Refills: 0 | DISCHARGE
Start: 2023-09-12

## 2023-09-12 RX ORDER — OXYCODONE HYDROCHLORIDE 5 MG/1
0.5 TABLET ORAL
Qty: 0 | Refills: 0 | DISCHARGE
Start: 2023-09-12

## 2023-09-12 RX ADMIN — SENNA PLUS 2 TABLET(S): 8.6 TABLET ORAL at 22:02

## 2023-09-12 RX ADMIN — MAGNESIUM HYDROXIDE 30 MILLILITER(S): 400 TABLET, CHEWABLE ORAL at 05:12

## 2023-09-12 RX ADMIN — Medication 10 MILLIGRAM(S): at 10:14

## 2023-09-12 RX ADMIN — POLYETHYLENE GLYCOL 3350 17 GRAM(S): 17 POWDER, FOR SOLUTION ORAL at 11:32

## 2023-09-12 RX ADMIN — Medication 975 MILLIGRAM(S): at 06:08

## 2023-09-12 RX ADMIN — VALSARTAN 80 MILLIGRAM(S): 80 TABLET ORAL at 05:07

## 2023-09-12 RX ADMIN — Medication 975 MILLIGRAM(S): at 22:01

## 2023-09-12 RX ADMIN — APIXABAN 2.5 MILLIGRAM(S): 2.5 TABLET, FILM COATED ORAL at 22:01

## 2023-09-12 RX ADMIN — Medication 1 TABLET(S): at 11:32

## 2023-09-12 RX ADMIN — Medication 2000 UNIT(S): at 11:32

## 2023-09-12 RX ADMIN — OXYCODONE HYDROCHLORIDE 5 MILLIGRAM(S): 5 TABLET ORAL at 12:37

## 2023-09-12 RX ADMIN — APIXABAN 2.5 MILLIGRAM(S): 2.5 TABLET, FILM COATED ORAL at 10:14

## 2023-09-12 RX ADMIN — OXYCODONE HYDROCHLORIDE 5 MILLIGRAM(S): 5 TABLET ORAL at 11:37

## 2023-09-12 RX ADMIN — Medication 975 MILLIGRAM(S): at 23:01

## 2023-09-12 RX ADMIN — ROSUVASTATIN CALCIUM 10 MILLIGRAM(S): 5 TABLET ORAL at 22:01

## 2023-09-12 RX ADMIN — SIMETHICONE 80 MILLIGRAM(S): 80 TABLET, CHEWABLE ORAL at 22:02

## 2023-09-12 RX ADMIN — Medication 975 MILLIGRAM(S): at 05:08

## 2023-09-12 RX ADMIN — PANTOPRAZOLE SODIUM 40 MILLIGRAM(S): 20 TABLET, DELAYED RELEASE ORAL at 05:08

## 2023-09-12 RX ADMIN — Medication 1 TABLET(S): at 11:33

## 2023-09-12 RX ADMIN — Medication 975 MILLIGRAM(S): at 13:16

## 2023-09-12 NOTE — CONSULT NOTE ADULT - ASSESSMENT
84 yo woman presents after a fall at home with a right hip fracture. Patient is scheduled for an Open reduction and internal fixation of the right hip
80 y/o F presents to ED c/o R Hip pain s/p MF. Community ambulator w/o assistance. Pt was walking, tripped and fell onto R Side. Pt noticed immediate pain and inability to ambulate. No HS/LOC. Pt also notes some mild R Shoulder pain. No other injuries or complaints. Not on AC. Found to have right hip fx and right humeral fx, She is now s/p IMN right hip 9/10. Now with hyponatremia,       1- hyponatremia  2- HTN      hyponatremia suspected in setting of hctz  d/c hctz, received dose this am  check U/A  urine lytes  serum uric acid added to blood work  continue valsartan 80 mg daily  trend bp  pain control   hold ivf  trend sodium in am
83 female h/o htn, chol, gerd, thrombocytopenia here s/p fall with right hip fx and right humeral fx    right hip fx and right humeral fx  ortho following  s/p IMN right hip  post op care per ortho  pain control  WBAT RLE    sling for right humeral fx  NWB RUE  pain control    htn  cont home meds  monitor closely  orthostatics noted nl    chol  cont statin    dvt ppx    PT/OT      Advanced care planning was discussed with patient and family.  Advanced care planning forms were reviewed and discussed as appropriate.  Differential diagnosis and plan of care discussed with patient after the evaluation.   Pain assessed and judicious use of narcotics when appropriate was discussed.  Importance of Fall prevention discussed.  Counseling on Smoking and Alcohol cessation was offered when appropriate.  Counseling on Diet, exercise, and medication compliance was done.       Approx 60 minutes spent.

## 2023-09-12 NOTE — CONSULT NOTE ADULT - NS ATTEND AMEND GEN_ALL_CORE FT
Seen, examined with, formulated plan with and  agree with above as scribed by NP Arthur [Lela]       lungs no rales no ronchi   heart RRR  no edema     pt with hyponatremia and htn with hctz use   one liter fluid restrict   trend na   dc hctz  diovan 80 mg daily to cont  d/w ortho team

## 2023-09-12 NOTE — PROGRESS NOTE ADULT - ASSESSMENT
83 female h/o htn, chol, gerd, thrombocytopenia here s/p fall with right hip fx and right humeral fx    right hip fx and right humeral fx  ortho following  s/p IMN right hip  post op care per ortho  pain control  WBAT RLE    sling for right humeral fx  NWB RUE  pain control    htn  cont home valsartan  hctz held 2/2 hypoNa  monitor closely  orthostatics noted nl    hyponatremia  likely 2/2 siadh from pain  holding hctz  fluid restrict 1 liter  monitor     chol  cont statin    dvt ppx    PT/OT      Advanced care planning was discussed with patient and family.  Advanced care planning forms were reviewed and discussed as appropriate.  Differential diagnosis and plan of care discussed with patient after the evaluation.   Pain assessed and judicious use of narcotics when appropriate was discussed.  Importance of Fall prevention discussed.  Counseling on Smoking and Alcohol cessation was offered when appropriate.  Counseling on Diet, exercise, and medication compliance was done.       Approx 60 minutes spent.

## 2023-09-12 NOTE — CONSULT NOTE ADULT - SUBJECTIVE AND OBJECTIVE BOX
[facility]  [phone]  NEPHROLOGY      INITIAL CONSULT NOTE  --------------------------------------------------------------------------------  HPI:    80 y/o F presents to ED c/o R Hip pain s/p MF. Community ambulator w/o assistance. Pt was walking, tripped and fell onto R Side. Pt noticed immediate pain and inability to ambulate. No HS/LOC. Pt also notes some mild R Shoulder pain. No other injuries or complaints. Not on AC. Found to have right hip fx and right humeral fx, She is now s/p IMN right hip 9/10. Now with hyponatremia, renal consult called.     PAST HISTORY  --------------------------------------------------------------------------------  PAST MEDICAL & SURGICAL HISTORY:  HTN (hypertension)      Hypercholesteremia      GERD (gastroesophageal reflux disease)      Arthritis      Thrombocytopenia      No significant past surgical history        FAMILY HISTORY:  No pertinent family history in first degree relatives      PAST SOCIAL HISTORY:  denies tobacco use    ALLERGIES & MEDICATIONS  --------------------------------------------------------------------------------  Allergies    No Known Allergies    Intolerances      Standing Inpatient Medications  acetaminophen     Tablet .. 975 milliGRAM(s) Oral every 8 hours  apixaban 2.5 milliGRAM(s) Oral every 12 hours  cholecalciferol 2000 Unit(s) Oral daily  influenza  Vaccine (HIGH DOSE) 0.7 milliLiter(s) IntraMuscular once  lactobacillus acidophilus 1 Tablet(s) Oral daily  multivitamin 1 Tablet(s) Oral daily  pantoprazole    Tablet 40 milliGRAM(s) Oral before breakfast  polyethylene glycol 3350 17 Gram(s) Oral daily  rosuvastatin 10 milliGRAM(s) Oral at bedtime  senna 2 Tablet(s) Oral at bedtime  sodium chloride 0.9%. 1000 milliLiter(s) IV Continuous <Continuous>  valsartan 80 milliGRAM(s) Oral daily    PRN Inpatient Medications  bisacodyl Suppository 10 milliGRAM(s) Rectal daily PRN  magnesium hydroxide Suspension 30 milliLiter(s) Oral daily PRN  ondansetron Injectable 4 milliGRAM(s) IV Push once PRN  oxyCODONE    IR 2.5 milliGRAM(s) Oral every 4 hours PRN  oxyCODONE    IR 5 milliGRAM(s) Oral every 4 hours PRN  simethicone 80 milliGRAM(s) Chew daily PRN      REVIEW OF SYSTEMS  --------------------------------------------------------------------------------  Gen: No  fevers/chills   Head/Eyes/Ears/Mouth: No headache; Normal hearing;    Respiratory: No dyspnea, cough, wheezing,  CV: No chest pain, orthopnea  GI: No abdominal pain, diarrhea, nausea, vomiting,   : No dysuria, decrease urination   MSK: +right hip and shoulder pain.   Neuro: No dizziness/lightheadedness, +weakness,  also with no edema     VITALS/PHYSICAL EXAM  --------------------------------------------------------------------------------  T(C): 36.5 (09-12-23 @ 13:30), Max: 37.1 (09-12-23 @ 08:12)  HR: 61 (09-12-23 @ 13:30) (59 - 76)  BP: 146/71 (09-12-23 @ 13:30) (134/54 - 151/75)  RR: 18 (09-12-23 @ 13:30) (18 - 18)  SpO2: 100% (09-12-23 @ 13:30) (94% - 100%)  Wt(kg): --        09-11-23 @ 07:01  -  09-12-23 @ 07:00  --------------------------------------------------------  IN: 580 mL / OUT: 1000 mL / NET: -420 mL    09-12-23 @ 07:01  -  09-12-23 @ 14:43  --------------------------------------------------------  IN: 360 mL / OUT: 800 mL / NET: -440 mL      Physical Exam:  	Gen: Non toxic comfortable appearing   	Pulm: decrease bs  no rales or ronchi or wheezing  	CV: No JVD. RRR, S1S2; no rub  	Abd: +BS, soft, nontender/nondistended  	: No suprapubic tenderness  	UE: Warm, no cyanosis  no clubbing,  no edema; R arm sling  	LE: Warm, no cyanosis  no clubbing, no edema, R hip surgical incision  	Neuro: alert and oriented. speech coherent   	Skin: Warm, no decrease skin turgor     LABS/STUDIES  --------------------------------------------------------------------------------              10.2   8.65  >-----------<  102      [09-12-23 @ 07:25]              30.4     128  |  94  |  23  ----------------------------<  118      [09-12-23 @ 07:24]  4.1   |  23  |  0.81        Ca     7.9     [09-12-23 @ 07:24]            Creatinine Trend:  SCr 0.81 [09-12 @ 07:24]  SCr 0.98 [09-11 @ 06:18]  SCr 0.73 [09-10 @ 14:51]  SCr 0.80 [09-10 @ 06:47]  SCr 0.90 [09-09 @ 23:22]              
AMIRAH GREEN  83y Female  MRN:[id number]    Patient is a 83y old  Female who presents with a chief complaint of Right hip fracture  Right Proximal humerus fx (11 Sep 2023 08:43)    HPI:  84 y/o F presents to ED c/o R Hip pain s/p MF. Community ambulator w/o assistance. Pt was walking, tripped and fell onto R Side. Pt noticed immediate pain and inability to ambulate. No HS/LOC. Pt also notes some mild R Shoulder pain. No other injuries or complaints. Not on AC. Denies CP, SOB, fever, chills, numbness/tingling, weakness or any other complaints.  (10 Sep 2023 01:52)      Patient seen and evaluated at bedside. No acute events overnight except as noted.    Interval HPI: pt s/p right hip IMN. POD 1    PAST MEDICAL & SURGICAL HISTORY:  HTN (hypertension)      Hypercholesteremia      GERD (gastroesophageal reflux disease)      Arthritis      Thrombocytopenia      No significant past surgical history          REVIEW OF SYSTEMS:  Constitutional: No fever, chills, fatigue or weight loss.  Skin: No rash.  Eyes: No recent vision problems or eye pain.  ENT: No congestion, ear pain, or sore throat.  Endocrine: No thyroid problems.  Cardiovascular: No chest pain or palpation.  Respiratory: No cough, shortness of breath, congestion, or wheezing.  Gastrointestinal: No abdominal pain, nausea, vomiting, or diarrhea.  Genitourinary: No dysuria.  Musculoskeletal: as per hpi   Neurologic: No headache.    VITALS:  Vital Signs Last 24 Hrs  T(C): 36.9 (11 Sep 2023 12:16), Max: 37.1 (10 Sep 2023 18:20)  T(F): 98.4 (11 Sep 2023 12:16), Max: 98.7 (10 Sep 2023 18:20)  HR: 65 (11 Sep 2023 12:16) (56 - 71)  BP: 125/70 (11 Sep 2023 12:16) (102/50 - 146/67)  BP(mean): 74 (10 Sep 2023 16:00) (74 - 88)  RR: 18 (11 Sep 2023 12:16) (14 - 18)  SpO2: 95% (11 Sep 2023 12:16) (94% - 100%)    Parameters below as of 11 Sep 2023 12:16  Patient On (Oxygen Delivery Method): room air      CAPILLARY BLOOD GLUCOSE        I&O's Summary    10 Sep 2023 07:01  -  11 Sep 2023 07:00  --------------------------------------------------------  IN: 1040 mL / OUT: 150 mL / NET: 890 mL    11 Sep 2023 07:01  -  11 Sep 2023 14:09  --------------------------------------------------------  IN: 240 mL / OUT: 0 mL / NET: 240 mL        PHYSICAL EXAM:  GENERAL: NAD, well-developed  HEAD:  Atraumatic, Normocephalic  EYES: EOMI, PERRLA, conjunctiva and sclera clear  NECK: Supple, No JVD  CHEST/LUNG: Clear to auscultation bilaterally; No wheeze  HEART: S1, S2; No murmurs, rubs, or gallops  ABDOMEN: Soft, Nontender, Nondistended; Bowel sounds present  EXTREMITIES:  2+ Peripheral Pulses, No clubbing, cyanosis, or edema. RUE sling  PSYCH: Normal affect  NEUROLOGY: AAOX3; non-focal  SKIN: No rashes or lesions    Consultant(s) Notes Reviewed:  [x ] YES  [ ] NO  Care Discussed with Consultants/Other Providers [ x] YES  [ ] NO    MEDS:  MEDICATIONS  (STANDING):  acetaminophen     Tablet .. 975 milliGRAM(s) Oral every 8 hours  apixaban 2.5 milliGRAM(s) Oral every 12 hours  cholecalciferol 2000 Unit(s) Oral daily  hydrochlorothiazide 12.5 milliGRAM(s) Oral daily  influenza  Vaccine (HIGH DOSE) 0.7 milliLiter(s) IntraMuscular once  lactobacillus acidophilus 1 Tablet(s) Oral daily  multivitamin 1 Tablet(s) Oral daily  pantoprazole    Tablet 40 milliGRAM(s) Oral before breakfast  polyethylene glycol 3350 17 Gram(s) Oral daily  rosuvastatin 10 milliGRAM(s) Oral at bedtime  senna 2 Tablet(s) Oral at bedtime  sodium chloride 0.9%. 1000 milliLiter(s) (125 mL/Hr) IV Continuous <Continuous>  valsartan 80 milliGRAM(s) Oral daily    MEDICATIONS  (PRN):  magnesium hydroxide Suspension 30 milliLiter(s) Oral daily PRN Constipation  ondansetron Injectable 4 milliGRAM(s) IV Push once PRN Nausea and/or Vomiting  oxyCODONE    IR 2.5 milliGRAM(s) Oral every 4 hours PRN Moderate Pain (4 - 6)  oxyCODONE    IR 5 milliGRAM(s) Oral every 4 hours PRN Severe Pain (7 - 10)  simethicone 80 milliGRAM(s) Chew daily PRN Indigestion    ALLERGIES:  No Known Allergies      LABS:                        9.8    8.99  )-----------( 98       ( 11 Sep 2023 06:18 )             29.7     09-11    134<L>  |  97  |  24<H>  ----------------------------<  150<H>  3.8   |  24  |  0.98    Ca    8.0<L>      11 Sep 2023 06:18    TPro  6.8  /  Alb  4.0  /  TBili  0.2  /  DBili  x   /  AST  24  /  ALT  19  /  AlkPhos  71  09-09    PT/INR - ( 10 Sep 2023 08:41 )   PT: 10.9 sec;   INR: 1.04 ratio         PTT - ( 10 Sep 2023 08:41 )  PTT:24.7 sec      LIVER FUNCTIONS - ( 09 Sep 2023 23:22 )  Alb: 4.0 g/dL / Pro: 6.8 g/dL / ALK PHOS: 71 U/L / ALT: 19 U/L / AST: 24 U/L / GGT: x           Urinalysis Basic - ( 11 Sep 2023 06:18 )    Color: x / Appearance: x / SG: x / pH: x  Gluc: 150 mg/dL / Ketone: x  / Bili: x / Urobili: x   Blood: x / Protein: x / Nitrite: x   Leuk Esterase: x / RBC: x / WBC x   Sq Epi: x / Non Sq Epi: x / Bacteria: x    < from: Xray Shoulder 2 Views, Right (09.10.23 @ 10:36) >  IMPRESSION:  Chronic appearing osseous fragmentation adjacent to posterior humeral   head margin. Otherwise no dislocations or suspected acute appearing   fractures. No AC separation.    Redemonstrated advanced AC joint arthrosis and rotator cuff arthropathy.   Preserved elbow joint spaces.    Generalized osteopenia otherwise no discrete lytic or blastic lesions.    --- End of Report ---        < end of copied text >  < from: Xray Humerus, Right (09.10.23 @ 01:59) >  IMPRESSION:    There is severe rotator cuff arthropathy. Humeral head is high riding   contacting the undersurface of the acromion. There is severe, clavicular   joint arthrosis. There is well-corticated ossific density along the   posterior lateral margin of the humeral head which may be related to   prior injury. There is a possible acute fracture involving the   inferolateral osteophyte along the humeral head. If indicated CT could be   performed to better characterize.    --- End of Report ---    < end of copied text >  < from: Xray Femur 2 Views, Right (09.10.23 @ 00:39) >    IMPRESSION:  Acute transverse intertrochanteric right femoral fracture with mild   displacement of the superior segment there is angulation.  Soft tissue swelling adjacent to the fracture    Partially visualized right knee shows significant joint space narrowing   between the medial femoral and tibial plateau.    Unremarkable remaining imaged pelvic osseous structures.    --- End of Report ---      < end of copied text >     
82 y/o F PMH osteoporosis, HTN, presenting after a fall. States she was on her phone and lost balance. Did not hit her head or lose conciousness. Hit her right side with right arm pain and right hip/leg pain. No SOB, chest pain, fevers, chills. Not on blood thinners. Patient was brought to [facility] for further evaluation and treatment. In the ED she was found to have a right hip fracture and is scheduled for an Open reduction and internal fixation of the right hip. Patient seen now resting comfortably        PAST MEDICAL & SURGICAL HISTORY:  HTN (hypertension)      Hypercholesteremia      GERD (gastroesophageal reflux disease)      Arthritis      Thrombocytopenia      Cataract extraction B/L            MEDICATIONS  (STANDING):  acetaminophen     Tablet .. 975 milliGRAM(s) Oral every 8 hours  atorvastatin 40 milliGRAM(s) Oral at bedtime  hydrochlorothiazide 12.5 milliGRAM(s) Oral daily  influenza  Vaccine (HIGH DOSE) 0.7 milliLiter(s) IntraMuscular once  lactated ringers. 1000 milliLiter(s) (75 mL/Hr) IV Continuous <Continuous>  lactobacillus acidophilus 1 Tablet(s) Oral daily  multivitamin 1 Tablet(s) Oral daily  pantoprazole   Suspension 40 milliGRAM(s) Oral daily  valsartan 80 milliGRAM(s) Oral daily    MEDICATIONS  (PRN):  oxyCODONE    IR 5 milliGRAM(s) Oral every 4 hours PRN Severe Pain (7 - 10)  oxyCODONE    IR 2.5 milliGRAM(s) Oral every 4 hours PRN Moderate Pain (4 - 6)  simethicone 80 milliGRAM(s) Chew daily PRN Indigestion    Social Hx:  Tobacco: neg  ETOH: neg  Drugs: Neg    Family Hx:  As per my conversation with the patient, non contributory       ROS  CONSTITUTIONAL: No weakness, fevers or chills  EYES/ENT: No visual changes;  No vertigo or throat pain   NECK: No pain or stiffness  RESPIRATORY: No cough, wheezing, hemoptysis; No shortness of breath  CARDIOVASCULAR: No chest pain or palpitations  GASTROINTESTINAL: No abdominal or epigastric pain. No nausea, vomiting, or hematemesis; No diarrhea or constipation. No melena or hematochezia.  GENITOURINARY: No dysuria, frequency or hematuria  NEUROLOGICAL: No numbness or weakness  SKIN: No itching, burning, rashes, or lesions   MUSCULOSKELETAL: right leg pain      INTERVAL HPI/OVERNIGHT EVENTS:  T(C): 36.8 (09-10-23 @ 08:30), Max: 36.8 (09-10-23 @ 08:30)  HR: 60 (09-10-23 @ 08:30) (53 - 90)  BP: 150/72 (09-10-23 @ 08:30) (150/72 - 183/71)  RR: 18 (09-10-23 @ 08:30) (18 - 22)  SpO2: 100% (09-10-23 @ 08:30) (96% - 100%)  Wt(kg): --  I&O's Summary    09 Sep 2023 07:01  -  10 Sep 2023 07:00  --------------------------------------------------------  IN: 300 mL / OUT: 0 mL / NET: 300 mL        PHYSICAL EXAM:  GENERAL: NAD, well-groomed, well-developed  HEAD:  Atraumatic, Normocephalic  EYES: EOMI, PERRLA, conjunctiva and sclera clear  ENMT: No tonsillar erythema, exudates, or enlargement; Moist mucous membranes, Good dentition, No lesions  NECK: Supple, No JVD, Normal thyroid  NERVOUS SYSTEM:  Alert & Oriented X3, Good concentration; Motor Strength 5/5 B/L upper and lower extremities; DTRs 2+ intact and symmetric  CHEST/LUNG: Clear to percussion bilaterally; No rales, rhonchi, wheezing, or rubs  HEART: Regular rate and rhythm; No murmurs, rubs, or gallops  ABDOMEN: Soft, Nontender, Nondistended; Bowel sounds present  EXTREMITIES:  2+ Peripheral Pulses, No clubbing, cyanosis, or edema  LYMPH: No lymphadenopathy noted  SKIN: No rashes or lesions        LABS:                        13.2   8.13  )-----------( 127      ( 10 Sep 2023 07:17 )             39.4     09-10    134<L>  |  94<L>  |  18  ----------------------------<  144<H>  4.4   |  27  |  0.80    Ca    9.1      10 Sep 2023 06:47    TPro  6.8  /  Alb  4.0  /  TBili  0.2  /  DBili  x   /  AST  24  /  ALT  19  /  AlkPhos  71  09-09    PT/INR - ( 10 Sep 2023 08:41 )   PT: 10.9 sec;   INR: 1.04 ratio         PTT - ( 10 Sep 2023 08:41 )  PTT:24.7 sec  Urinalysis Basic - ( 10 Sep 2023 06:47 )    Color: x / Appearance: x / SG: x / pH: x  Gluc: 144 mg/dL / Ketone: x  / Bili: x / Urobili: x   Blood: x / Protein: x / Nitrite: x   Leuk Esterase: x / RBC: x / WBC x   Sq Epi: x / Non Sq Epi: x / Bacteria: x      CAPILLARY BLOOD GLUCOSE            Urinalysis Basic - ( 10 Sep 2023 06:47 )    Color: x / Appearance: x / SG: x / pH: x  Gluc: 144 mg/dL / Ketone: x  / Bili: x / Urobili: x   Blood: x / Protein: x / Nitrite: x   Leuk Esterase: x / RBC: x / WBC x   Sq Epi: x / Non Sq Epi: x / Bacteria: x    EKG: NSR @ 64 BPM

## 2023-09-12 NOTE — CONSULT NOTE ADULT - NS ATTEND BILL GEN_ALL_CORE
Attending to bill Hydroxychloroquine Counseling:  I discussed with the patient that a baseline ophthalmologic exam is needed at the start of therapy and every year thereafter while on therapy. A CBC may also be warranted for monitoring.  The side effects of this medication were discussed with the patient, including but not limited to agranulocytosis, aplastic anemia, seizures, rashes, retinopathy, and liver toxicity. Patient instructed to call the office should any adverse effect occur.  The patient verbalized understanding of the proper use and possible adverse effects of Plaquenil.  All the patient's questions and concerns were addressed.

## 2023-09-12 NOTE — PROGRESS NOTE ADULT - ASSESSMENT
Pt is a 84 y/o Female who presented with Right Proximal humerus fracture and Right  IT fracture sp Left hip IMN on 9/10/23 . Patient is hemodynamically stable; recovering well from orthopaedic standpoint.  -    Multimodal Pain control  -    DVT ppx: Eliquis  -    Resumed home meds  -    Check AM labs  -    Weight bearing status: WBAT RLE, NWB RUE in sling  -    PT/OT  -    Dispo:  [facility]      Orthopaedic Surgery  [facility] [place] [id number]  [facility]        [id number]  [facility]  [id number]/ [phone]   Pt is a 84 y/o Female who presented with Right Proximal humerus fracture and Right  IT fracture sp Left hip IMN on 9/10/23 . Patient is hemodynamically stable; recovering well from orthopaedic standpoint.  -    Will continue to monitor SOB (likely from disorientation)  -    Multimodal Pain control  -    DVT ppx: Eliquis  -    Resumed home meds  -    Check AM labs  -    Weight bearing status: WBAT RLE, NWB RUE in sling  -    PT/OT  -    Dispo:  [facility]      [place]thopaedic [place]  [facility] [place] [id number]  [facility]        [id number]  [facility]  [id number]/ [phone]

## 2023-09-13 ENCOUNTER — TRANSCRIPTION ENCOUNTER (OUTPATIENT)
Age: 83
End: 2023-09-13

## 2023-09-13 VITALS
OXYGEN SATURATION: 92 % | TEMPERATURE: 98 F | RESPIRATION RATE: 18 BRPM | SYSTOLIC BLOOD PRESSURE: 137 MMHG | HEART RATE: 75 BPM | DIASTOLIC BLOOD PRESSURE: 82 MMHG

## 2023-09-13 LAB
ANION GAP SERPL CALC-SCNC: 9 MMOL/L — SIGNIFICANT CHANGE UP (ref 5–17)
BUN SERPL-MCNC: 19 MG/DL — SIGNIFICANT CHANGE UP (ref 7–23)
CALCIUM SERPL-MCNC: 8 MG/DL — LOW (ref 8.4–10.5)
CHLORIDE SERPL-SCNC: 98 MMOL/L — SIGNIFICANT CHANGE UP (ref 96–108)
CHLORIDE UR-SCNC: 25 MMOL/L — SIGNIFICANT CHANGE UP
CO2 SERPL-SCNC: 27 MMOL/L — SIGNIFICANT CHANGE UP (ref 22–31)
CREAT ?TM UR-MCNC: 20 MG/DL — SIGNIFICANT CHANGE UP
CREAT SERPL-MCNC: 0.8 MG/DL — SIGNIFICANT CHANGE UP (ref 0.5–1.3)
EGFR: 74 ML/MIN/1.73M2 — SIGNIFICANT CHANGE UP
GLUCOSE SERPL-MCNC: 114 MG/DL — HIGH (ref 70–99)
HCT VFR BLD CALC: 30 % — LOW (ref 34.5–45)
HGB BLD-MCNC: 9.9 G/DL — LOW (ref 11.5–15.5)
MCHC RBC-ENTMCNC: 30.1 PG — SIGNIFICANT CHANGE UP (ref 27–34)
MCHC RBC-ENTMCNC: 33 GM/DL — SIGNIFICANT CHANGE UP (ref 32–36)
MCV RBC AUTO: 91.2 FL — SIGNIFICANT CHANGE UP (ref 80–100)
NRBC # BLD: 0 /100 WBCS — SIGNIFICANT CHANGE UP (ref 0–0)
PLATELET # BLD AUTO: 118 K/UL — LOW (ref 150–400)
POTASSIUM SERPL-MCNC: 4.8 MMOL/L — SIGNIFICANT CHANGE UP (ref 3.5–5.3)
POTASSIUM SERPL-SCNC: 4.8 MMOL/L — SIGNIFICANT CHANGE UP (ref 3.5–5.3)
POTASSIUM UR-SCNC: 36 MMOL/L — SIGNIFICANT CHANGE UP
RBC # BLD: 3.29 M/UL — LOW (ref 3.8–5.2)
RBC # FLD: 12.2 % — SIGNIFICANT CHANGE UP (ref 10.3–14.5)
SODIUM SERPL-SCNC: 134 MMOL/L — LOW (ref 135–145)
SODIUM UR-SCNC: 19 MMOL/L — SIGNIFICANT CHANGE UP
WBC # BLD: 7.68 K/UL — SIGNIFICANT CHANGE UP (ref 3.8–10.5)
WBC # FLD AUTO: 7.68 K/UL — SIGNIFICANT CHANGE UP (ref 3.8–10.5)

## 2023-09-13 PROCEDURE — 85730 THROMBOPLASTIN TIME PARTIAL: CPT

## 2023-09-13 PROCEDURE — 82570 ASSAY OF URINE CREATININE: CPT

## 2023-09-13 PROCEDURE — 96375 TX/PRO/DX INJ NEW DRUG ADDON: CPT

## 2023-09-13 PROCEDURE — 73060 X-RAY EXAM OF HUMERUS: CPT

## 2023-09-13 PROCEDURE — 81003 URINALYSIS AUTO W/O SCOPE: CPT

## 2023-09-13 PROCEDURE — 84133 ASSAY OF URINE POTASSIUM: CPT

## 2023-09-13 PROCEDURE — 73552 X-RAY EXAM OF FEMUR 2/>: CPT

## 2023-09-13 PROCEDURE — C9399: CPT

## 2023-09-13 PROCEDURE — 99285 EMERGENCY DEPT VISIT HI MDM: CPT

## 2023-09-13 PROCEDURE — 84300 ASSAY OF URINE SODIUM: CPT

## 2023-09-13 PROCEDURE — 82436 ASSAY OF URINE CHLORIDE: CPT

## 2023-09-13 PROCEDURE — 97161 PT EVAL LOW COMPLEX 20 MIN: CPT

## 2023-09-13 PROCEDURE — 71045 X-RAY EXAM CHEST 1 VIEW: CPT

## 2023-09-13 PROCEDURE — 76000 FLUOROSCOPY <1 HR PHYS/QHP: CPT

## 2023-09-13 PROCEDURE — 83935 ASSAY OF URINE OSMOLALITY: CPT

## 2023-09-13 PROCEDURE — 73020 X-RAY EXAM OF SHOULDER: CPT

## 2023-09-13 PROCEDURE — 73502 X-RAY EXAM HIP UNI 2-3 VIEWS: CPT

## 2023-09-13 PROCEDURE — 85027 COMPLETE CBC AUTOMATED: CPT

## 2023-09-13 PROCEDURE — 96374 THER/PROPH/DIAG INJ IV PUSH: CPT

## 2023-09-13 PROCEDURE — 86850 RBC ANTIBODY SCREEN: CPT

## 2023-09-13 PROCEDURE — 97166 OT EVAL MOD COMPLEX 45 MIN: CPT

## 2023-09-13 PROCEDURE — 36415 COLL VENOUS BLD VENIPUNCTURE: CPT

## 2023-09-13 PROCEDURE — C1776: CPT

## 2023-09-13 PROCEDURE — 86900 BLOOD TYPING SEROLOGIC ABO: CPT

## 2023-09-13 PROCEDURE — 80048 BASIC METABOLIC PNL TOTAL CA: CPT

## 2023-09-13 PROCEDURE — C1713: CPT

## 2023-09-13 PROCEDURE — 93005 ELECTROCARDIOGRAM TRACING: CPT

## 2023-09-13 PROCEDURE — 85610 PROTHROMBIN TIME: CPT

## 2023-09-13 PROCEDURE — 97530 THERAPEUTIC ACTIVITIES: CPT

## 2023-09-13 PROCEDURE — 86901 BLOOD TYPING SEROLOGIC RH(D): CPT

## 2023-09-13 PROCEDURE — 73030 X-RAY EXAM OF SHOULDER: CPT

## 2023-09-13 PROCEDURE — 97116 GAIT TRAINING THERAPY: CPT

## 2023-09-13 PROCEDURE — 80053 COMPREHEN METABOLIC PANEL: CPT

## 2023-09-13 PROCEDURE — 85025 COMPLETE CBC W/AUTO DIFF WBC: CPT

## 2023-09-13 PROCEDURE — 84550 ASSAY OF BLOOD/URIC ACID: CPT

## 2023-09-13 RX ADMIN — PANTOPRAZOLE SODIUM 40 MILLIGRAM(S): 20 TABLET, DELAYED RELEASE ORAL at 05:48

## 2023-09-13 RX ADMIN — POLYETHYLENE GLYCOL 3350 17 GRAM(S): 17 POWDER, FOR SOLUTION ORAL at 11:08

## 2023-09-13 RX ADMIN — Medication 975 MILLIGRAM(S): at 05:48

## 2023-09-13 RX ADMIN — Medication 1 TABLET(S): at 11:09

## 2023-09-13 RX ADMIN — Medication 975 MILLIGRAM(S): at 13:17

## 2023-09-13 RX ADMIN — Medication 2000 UNIT(S): at 11:09

## 2023-09-13 RX ADMIN — SIMETHICONE 80 MILLIGRAM(S): 80 TABLET, CHEWABLE ORAL at 11:09

## 2023-09-13 RX ADMIN — Medication 1 TABLET(S): at 11:08

## 2023-09-13 RX ADMIN — Medication 975 MILLIGRAM(S): at 06:37

## 2023-09-13 RX ADMIN — APIXABAN 2.5 MILLIGRAM(S): 2.5 TABLET, FILM COATED ORAL at 11:09

## 2023-09-13 RX ADMIN — VALSARTAN 80 MILLIGRAM(S): 80 TABLET ORAL at 05:48

## 2023-09-13 NOTE — PROGRESS NOTE ADULT - ASSESSMENT
83 female h/o htn, chol, gerd, thrombocytopenia here s/p fall with right hip fx and right humeral fx    right hip fx and right humeral fx  ortho following  s/p IMN right hip  post op care per ortho  pain control  WBAT RLE    sling for right humeral fx  NWB RUE  pain control    htn  cont home valsartan  hctz held 2/2 hypoNa  monitor closely  orthostatics noted nl    hyponatremia  likely 2/2 siadh from pain  holding hctz  fluid restrict 1 liter  monitor   Na improved  renal following    chol  cont statin    dvt ppx    PT/OT      Advanced care planning was discussed with patient and family.  Advanced care planning forms were reviewed and discussed as appropriate.  Differential diagnosis and plan of care discussed with patient after the evaluation.   Pain assessed and judicious use of narcotics when appropriate was discussed.  Importance of Fall prevention discussed.  Counseling on Smoking and Alcohol cessation was offered when appropriate.  Counseling on Diet, exercise, and medication compliance was done.       Approx 60 minutes spent.

## 2023-09-13 NOTE — PROGRESS NOTE ADULT - SUBJECTIVE AND OBJECTIVE BOX
AMIRAH GREEN  83y Female  MRN:[id number]    Patient is a 83y old  Female who presents with a chief complaint of Right hip fracture  Right Proximal humerus fx (11 Sep 2023 08:43)    HPI:  84 y/o F presents to ED c/o R Hip pain s/p MF. Community ambulator w/o assistance. Pt was walking, tripped and fell onto R Side. Pt noticed immediate pain and inability to ambulate. No HS/LOC. Pt also notes some mild R Shoulder pain. No other injuries or complaints. Not on AC. Denies CP, SOB, fever, chills, numbness/tingling, weakness or any other complaints.  (10 Sep 2023 01:52)      Patient seen and evaluated at bedside. No acute events overnight except as noted.    Interval HPI: pt s/p right hip IMN. POD 3    PAST MEDICAL & SURGICAL HISTORY:  HTN (hypertension)      Hypercholesteremia      GERD (gastroesophageal reflux disease)      Arthritis      Thrombocytopenia      No significant past surgical history          REVIEW OF SYSTEMS:  Constitutional: No fever, chills, fatigue or weight loss.  Skin: No rash.  Eyes: No recent vision problems or eye pain.  ENT: No congestion, ear pain, or sore throat.  Endocrine: No thyroid problems.  Cardiovascular: No chest pain or palpation.  Respiratory: No cough, shortness of breath, congestion, or wheezing.  Gastrointestinal: No abdominal pain, nausea, vomiting, or diarrhea.  Genitourinary: No dysuria.  Musculoskeletal: as per hpi   Neurologic: No headache.    VITALS:   Vital Signs Last 24 Hrs  T(C): 36.8 (13 Sep 2023 08:57), Max: 37.1 (13 Sep 2023 00:00)  T(F): 98.2 (13 Sep 2023 08:57), Max: 98.7 (13 Sep 2023 00:00)  HR: 71 (13 Sep 2023 08:57) (61 - 79)  BP: 119/68 (13 Sep 2023 08:57) (119/68 - 158/73)  BP(mean): --  RR: 18 (13 Sep 2023 08:57) (18 - 18)  SpO2: 99% (13 Sep 2023 08:57) (97% - 100%)    Parameters below as of 13 Sep 2023 08:57  Patient On (Oxygen Delivery Method): room air          PHYSICAL EXAM:  GENERAL: NAD, well-developed  HEAD:  Atraumatic, Normocephalic  EYES: EOMI, PERRLA, conjunctiva and sclera clear  NECK: Supple, No JVD  CHEST/LUNG: Clear to auscultation bilaterally; No wheeze  HEART: S1, S2; No murmurs, rubs, or gallops  ABDOMEN: Soft, Nontender, Nondistended; Bowel sounds present  EXTREMITIES:  2+ Peripheral Pulses, No clubbing, cyanosis, or edema. RUE sling  PSYCH: Normal affect  NEUROLOGY: AAOX3; non-focal  SKIN: No rashes or lesions    Consultant(s) Notes Reviewed:  [x ] YES  [ ] NO  Care Discussed with Consultants/Other Providers [ x] YES  [ ] NO    MEDS:   MEDICATIONS  (STANDING):  acetaminophen     Tablet .. 975 milliGRAM(s) Oral every 8 hours  apixaban 2.5 milliGRAM(s) Oral every 12 hours  cholecalciferol 2000 Unit(s) Oral daily  influenza  Vaccine (HIGH DOSE) 0.7 milliLiter(s) IntraMuscular once  lactobacillus acidophilus 1 Tablet(s) Oral daily  multivitamin 1 Tablet(s) Oral daily  pantoprazole    Tablet 40 milliGRAM(s) Oral before breakfast  polyethylene glycol 3350 17 Gram(s) Oral daily  rosuvastatin 10 milliGRAM(s) Oral at bedtime  senna 2 Tablet(s) Oral at bedtime  sodium chloride 0.9%. 1000 milliLiter(s) (125 mL/Hr) IV Continuous <Continuous>  valsartan 80 milliGRAM(s) Oral daily    MEDICATIONS  (PRN):  bisacodyl Suppository 10 milliGRAM(s) Rectal daily PRN Constipation  magnesium hydroxide Suspension 30 milliLiter(s) Oral daily PRN Constipation  ondansetron Injectable 4 milliGRAM(s) IV Push once PRN Nausea and/or Vomiting  oxyCODONE    IR 5 milliGRAM(s) Oral every 4 hours PRN Severe Pain (7 - 10)  oxyCODONE    IR 2.5 milliGRAM(s) Oral every 4 hours PRN Moderate Pain (4 - 6)  simethicone 80 milliGRAM(s) Chew daily PRN Indigestion      ALLERGIES:  No Known Allergies      LABS:                                           9.9    7.68  )-----------( 118      ( 13 Sep 2023 06:12 )             30.0   09-13    134<L>  |  98  |  19  ----------------------------<  114<H>  4.8   |  27  |  0.80    Ca    8.0<L>      13 Sep 2023 06:12        < from: Xray Shoulder 2 Views, Right (09.10.23 @ 10:36) >  IMPRESSION:  Chronic appearing osseous fragmentation adjacent to posterior humeral   head margin. Otherwise no dislocations or suspected acute appearing   fractures. No AC separation.    Redemonstrated advanced AC joint arthrosis and rotator cuff arthropathy.   Preserved elbow joint spaces.    Generalized osteopenia otherwise no discrete lytic or blastic lesions.    --- End of Report ---        < end of copied text >  < from: Xray Humerus, Right (09.10.23 @ 01:59) >  IMPRESSION:    There is severe rotator cuff arthropathy. Humeral head is high riding   contacting the undersurface of the acromion. There is severe, clavicular   joint arthrosis. There is well-corticated ossific density along the   posterior lateral margin of the humeral head which may be related to   prior injury. There is a possible acute fracture involving the   inferolateral osteophyte along the humeral head. If indicated CT could be   performed to better characterize.    --- End of Report ---    < end of copied text >  < from: Xray Femur 2 Views, Right (09.10.23 @ 00:39) >    IMPRESSION:  Acute transverse intertrochanteric right femoral fracture with mild   displacement of the superior segment there is angulation.  Soft tissue swelling adjacent to the fracture    Partially visualized right knee shows significant joint space narrowing   between the medial femoral and tibial plateau.    Unremarkable remaining imaged pelvic osseous structures.    --- End of Report ---      < end of copied text >     
AMIRAH GREEN  83y Female  MRN:[id number]    Patient is a 83y old  Female who presents with a chief complaint of Right hip fracture  Right Proximal humerus fx (11 Sep 2023 08:43)    HPI:  82 y/o F presents to ED c/o R Hip pain s/p MF. Community ambulator w/o assistance. Pt was walking, tripped and fell onto R Side. Pt noticed immediate pain and inability to ambulate. No HS/LOC. Pt also notes some mild R Shoulder pain. No other injuries or complaints. Not on AC. Denies CP, SOB, fever, chills, numbness/tingling, weakness or any other complaints.  (10 Sep 2023 01:52)      Patient seen and evaluated at bedside. No acute events overnight except as noted.    Interval HPI: pt s/p right hip IMN. POD 2    PAST MEDICAL & SURGICAL HISTORY:  HTN (hypertension)      Hypercholesteremia      GERD (gastroesophageal reflux disease)      Arthritis      Thrombocytopenia      No significant past surgical history          REVIEW OF SYSTEMS:  Constitutional: No fever, chills, fatigue or weight loss.  Skin: No rash.  Eyes: No recent vision problems or eye pain.  ENT: No congestion, ear pain, or sore throat.  Endocrine: No thyroid problems.  Cardiovascular: No chest pain or palpation.  Respiratory: No cough, shortness of breath, congestion, or wheezing.  Gastrointestinal: No abdominal pain, nausea, vomiting, or diarrhea.  Genitourinary: No dysuria.  Musculoskeletal: as per hpi   Neurologic: No headache.    VITALS:   Vital Signs Last 24 Hrs  T(C): 37.1 (12 Sep 2023 08:12), Max: 37.1 (12 Sep 2023 08:12)  T(F): 98.8 (12 Sep 2023 08:12), Max: 98.8 (12 Sep 2023 08:12)  HR: 65 (12 Sep 2023 08:12) (59 - 76)  BP: 134/72 (12 Sep 2023 08:12) (134/54 - 151/75)  BP(mean): --  RR: 18 (12 Sep 2023 08:12) (18 - 18)  SpO2: 100% (12 Sep 2023 08:12) (94% - 100%)    Parameters below as of 12 Sep 2023 08:12  Patient On (Oxygen Delivery Method): room air        PHYSICAL EXAM:  GENERAL: NAD, well-developed  HEAD:  Atraumatic, Normocephalic  EYES: EOMI, PERRLA, conjunctiva and sclera clear  NECK: Supple, No JVD  CHEST/LUNG: Clear to auscultation bilaterally; No wheeze  HEART: S1, S2; No murmurs, rubs, or gallops  ABDOMEN: Soft, Nontender, Nondistended; Bowel sounds present  EXTREMITIES:  2+ Peripheral Pulses, No clubbing, cyanosis, or edema. RUE sling  PSYCH: Normal affect  NEUROLOGY: AAOX3; non-focal  SKIN: No rashes or lesions    Consultant(s) Notes Reviewed:  [x ] YES  [ ] NO  Care Discussed with Consultants/Other Providers [ x] YES  [ ] NO    MEDS:   MEDICATIONS  (STANDING):  acetaminophen     Tablet .. 975 milliGRAM(s) Oral every 8 hours  apixaban 2.5 milliGRAM(s) Oral every 12 hours  cholecalciferol 2000 Unit(s) Oral daily  influenza  Vaccine (HIGH DOSE) 0.7 milliLiter(s) IntraMuscular once  lactobacillus acidophilus 1 Tablet(s) Oral daily  multivitamin 1 Tablet(s) Oral daily  pantoprazole    Tablet 40 milliGRAM(s) Oral before breakfast  polyethylene glycol 3350 17 Gram(s) Oral daily  rosuvastatin 10 milliGRAM(s) Oral at bedtime  senna 2 Tablet(s) Oral at bedtime  sodium chloride 0.9%. 1000 milliLiter(s) (125 mL/Hr) IV Continuous <Continuous>  valsartan 80 milliGRAM(s) Oral daily    MEDICATIONS  (PRN):  bisacodyl Suppository 10 milliGRAM(s) Rectal daily PRN Constipation  magnesium hydroxide Suspension 30 milliLiter(s) Oral daily PRN Constipation  ondansetron Injectable 4 milliGRAM(s) IV Push once PRN Nausea and/or Vomiting  oxyCODONE    IR 5 milliGRAM(s) Oral every 4 hours PRN Severe Pain (7 - 10)  oxyCODONE    IR 2.5 milliGRAM(s) Oral every 4 hours PRN Moderate Pain (4 - 6)  simethicone 80 milliGRAM(s) Chew daily PRN Indigestion    ALLERGIES:  No Known Allergies      LABS:                                     10.2   8.65  )-----------( 102      ( 12 Sep 2023 07:25 )             30.4   09-12    128<L>  |  94<L>  |  23  ----------------------------<  118<H>  4.1   |  23  |  0.81    Ca    7.9<L>      12 Sep 2023 07:24        < from: Xray Shoulder 2 Views, Right (09.10.23 @ 10:36) >  IMPRESSION:  Chronic appearing osseous fragmentation adjacent to posterior humeral   head margin. Otherwise no dislocations or suspected acute appearing   fractures. No AC separation.    Redemonstrated advanced AC joint arthrosis and rotator cuff arthropathy.   Preserved elbow joint spaces.    Generalized osteopenia otherwise no discrete lytic or blastic lesions.    --- End of Report ---        < end of copied text >  < from: Xray Humerus, Right (09.10.23 @ 01:59) >  IMPRESSION:    There is severe rotator cuff arthropathy. Humeral head is high riding   contacting the undersurface of the acromion. There is severe, clavicular   joint arthrosis. There is well-corticated ossific density along the   posterior lateral margin of the humeral head which may be related to   prior injury. There is a possible acute fracture involving the   inferolateral osteophyte along the humeral head. If indicated CT could be   performed to better characterize.    --- End of Report ---    < end of copied text >  < from: Xray Femur 2 Views, Right (09.10.23 @ 00:39) >    IMPRESSION:  Acute transverse intertrochanteric right femoral fracture with mild   displacement of the superior segment there is angulation.  Soft tissue swelling adjacent to the fracture    Partially visualized right knee shows significant joint space narrowing   between the medial femoral and tibial plateau.    Unremarkable remaining imaged pelvic osseous structures.    --- End of Report ---      < end of copied text >     
SUBJECTIVE:   Patient seen and examined at bedside. No acute events in the post-operative period thus far   Pt doing generally well.  States she is doing better  Pain well controlled with medication.    OBJECTIVE:  Vital Signs Last 24 Hrs  T(C): 36.9 (10 Sep 2023 23:49), Max: 37.1 (10 Sep 2023 18:20)  T(F): 98.5 (10 Sep 2023 23:49), Max: 98.7 (10 Sep 2023 18:20)  HR: 62 (10 Sep 2023 23:49) (55 - 71)  BP: 146/67 (10 Sep 2023 23:49) (102/50 - 155/62)  BP(mean): 74 (10 Sep 2023 16:00) (74 - 88)  RR: 18 (10 Sep 2023 23:49) (14 - 18)  SpO2: 96% (10 Sep 2023 23:49) (94% - 100%)    Parameters below as of 10 Sep 2023 23:49  Patient On (Oxygen Delivery Method): room air        General: NAD  Resp: Non-labored breathing, no accessory muscle use  Right Lower extremity:          Proximal Dressing saturated, changed at bedside on 9/11         Distal dressing c/d/i         Sensation: SILT         Motor exam: + TA/GS/EHL/FHL         warm well perfused          compartments soft        +PT pulse        capillary refill <3 seconds     LABS:                        11.9   10.88 )-----------( 101      ( 10 Sep 2023 14:51 )             35.2     09-10    133<L>  |  95<L>  |  15  ----------------------------<  162<H>  3.8   |  23  |  0.73    Ca    8.6      10 Sep 2023 14:51    TPro  6.8  /  Alb  4.0  /  TBili  0.2  /  DBili  x   /  AST  24  /  ALT  19  /  AlkPhos  71  09-09    I&O's Summary    09 Sep 2023 07:01  -  10 Sep 2023 07:00  --------------------------------------------------------  IN: 300 mL / OUT: 0 mL / NET: 300 mL    10 Sep 2023 07:01  -  11 Sep 2023 02:08  --------------------------------------------------------  IN: 470 mL / OUT: 150 mL / NET: 320 mL        MEDS:  MEDICATIONS  (STANDING):  acetaminophen     Tablet .. 975 milliGRAM(s) Oral every 8 hours  amLODIPine   Tablet 5 milliGRAM(s) Oral at bedtime  apixaban 2.5 milliGRAM(s) Oral every 12 hours  atorvastatin 40 milliGRAM(s) Oral at bedtime  ceFAZolin   IVPB 2000 milliGRAM(s) IV Intermittent every 8 hours  hydrochlorothiazide 12.5 milliGRAM(s) Oral daily  influenza  Vaccine (HIGH DOSE) 0.7 milliLiter(s) IntraMuscular once  lactobacillus acidophilus 1 Tablet(s) Oral daily  multivitamin 1 Tablet(s) Oral daily  pantoprazole    Tablet 40 milliGRAM(s) Oral before breakfast  polyethylene glycol 3350 17 Gram(s) Oral daily  senna 2 Tablet(s) Oral at bedtime  sodium chloride 0.9%. 1000 milliLiter(s) (125 mL/Hr) IV Continuous <Continuous>  valsartan 80 milliGRAM(s) Oral daily    MEDICATIONS  (PRN):  magnesium hydroxide Suspension 30 milliLiter(s) Oral daily PRN Constipation  ondansetron Injectable 4 milliGRAM(s) IV Push once PRN Nausea and/or Vomiting  oxyCODONE    IR 5 milliGRAM(s) Oral every 4 hours PRN Severe Pain (7 - 10)  oxyCODONE    IR 2.5 milliGRAM(s) Oral every 4 hours PRN Moderate Pain (4 - 6)  simethicone 80 milliGRAM(s) Chew daily PRN Indigestion        
SUBJECTIVE:   Patient seen and examined at bedside. Per RN patient woke up in a panic, thinking she was home.  Patient states that she feels hot and short of breath. No CP, no cough, no wheezing, no palpitations.   Vital signs stable. Patient reassured.    OBJECTIVE:  Vital Signs Last 24 Hrs  T(C): 36.9 (12 Sep 2023 04:46), Max: 36.9 (11 Sep 2023 12:16)  T(F): 98.4 (12 Sep 2023 04:46), Max: 98.4 (11 Sep 2023 12:16)  HR: 76 (12 Sep 2023 04:46) (59 - 76)  BP: 151/75 (12 Sep 2023 04:46) (125/70 - 151/75)  BP(mean): --  RR: 18 (12 Sep 2023 04:46) (18 - 18)  SpO2: 94% (12 Sep 2023 04:46) (94% - 99%)    Parameters below as of 12 Sep 2023 04:46  Patient On (Oxygen Delivery Method): room air        General: NAD  Resp: Non-labored breathing, no accessory muscle use  Right Lower extremity:          Dressing: clean/dry/intact            Sensation: SILT         Motor exam: + TA/GS/EHL/FHL         warm well perfused          compartments soft        +pulse      LABS:                        9.8    8.99  )-----------( 98       ( 11 Sep 2023 06:18 )             29.7     09-11    134<L>  |  97  |  24<H>  ----------------------------<  150<H>  3.8   |  24  |  0.98    Ca    8.0<L>      11 Sep 2023 06:18      I&O's Summary    10 Sep 2023 07:01  -  11 Sep 2023 07:00  --------------------------------------------------------  IN: 1040 mL / OUT: 150 mL / NET: 890 mL    11 Sep 2023 07:01  -  12 Sep 2023 06:16  --------------------------------------------------------  IN: 580 mL / OUT: 1000 mL / NET: -420 mL        MEDS:  MEDICATIONS  (STANDING):  acetaminophen     Tablet .. 975 milliGRAM(s) Oral every 8 hours  apixaban 2.5 milliGRAM(s) Oral every 12 hours  cholecalciferol 2000 Unit(s) Oral daily  hydrochlorothiazide 12.5 milliGRAM(s) Oral daily  influenza  Vaccine (HIGH DOSE) 0.7 milliLiter(s) IntraMuscular once  lactobacillus acidophilus 1 Tablet(s) Oral daily  multivitamin 1 Tablet(s) Oral daily  pantoprazole    Tablet 40 milliGRAM(s) Oral before breakfast  polyethylene glycol 3350 17 Gram(s) Oral daily  rosuvastatin 10 milliGRAM(s) Oral at bedtime  senna 2 Tablet(s) Oral at bedtime  sodium chloride 0.9%. 1000 milliLiter(s) (125 mL/Hr) IV Continuous <Continuous>  valsartan 80 milliGRAM(s) Oral daily    MEDICATIONS  (PRN):  magnesium hydroxide Suspension 30 milliLiter(s) Oral daily PRN Constipation  ondansetron Injectable 4 milliGRAM(s) IV Push once PRN Nausea and/or Vomiting  oxyCODONE    IR 5 milliGRAM(s) Oral every 4 hours PRN Severe Pain (7 - 10)  oxyCODONE    IR 2.5 milliGRAM(s) Oral every 4 hours PRN Moderate Pain (4 - 6)  simethicone 80 milliGRAM(s) Chew daily PRN Indigestion      
ORTHO PROGRESS NOTE     Patient is status post right hip IMN, closed tx of a right proximal humerus fx, POD #3  Pt seen and examined at bedside, denies SOB, CP, Dizziness. N/V/D/HA.    No significant overnight events. Pain well controlled.   Patient with hyponatremia yesterday. Fluid restriction, HCTZ held.     Vital Signs Last 24 Hrs  T(C): 36.7 (13 Sep 2023 04:02), Max: 37.1 (12 Sep 2023 08:12)  T(F): 98.1 (13 Sep 2023 04:02), Max: 98.8 (12 Sep 2023 08:12)  HR: 68 (13 Sep 2023 05:45) (61 - 79)  BP: 137/60 (13 Sep 2023 05:45) (131/65 - 158/73)  BP(mean): --  RR: 18 (13 Sep 2023 04:02) (18 - 18)  SpO2: 98% (13 Sep 2023 04:02) (97% - 100%)    Parameters below as of 13 Sep 2023 04:02  Patient On (Oxygen Delivery Method): room air        Exam:   Gen: No apparent distress  RLE: Dressings clean dry and intact to right lateral thigh. RUE: in sling. Skin intact.   BLE: motor intact EHL/FHL/TA/GS.  Sensation is grossly intact to light touch in the distal extremities.    BUE: digits, wrist, elbow motion intact.   Compartments are soft bilaterally.  Extremities are warm .  DP 2+ BLE +Radial pulse bilateral UE    Labs:  CBC Full  -  ( 13 Sep 2023 06:12 )  WBC Count : 7.68 K/uL  RBC Count : 3.29 M/uL  Hemoglobin : 9.9 g/dL  Hematocrit : 30.0 %  Platelet Count - Automated : 118 K/uL  Mean Cell Volume : 91.2 fl  Mean Cell Hemoglobin : 30.1 pg  Mean Cell Hemoglobin Concentration : 33.0 gm/dL          09-12    128<L>  |  94<L>  |  23  ----------------------------<  118<H>  4.1   |  23  |  0.81    Ca    7.9<L>      12 Sep 2023 07:24    BMP pending this AM.

## 2023-09-13 NOTE — DISCHARGE NOTE NURSING/CASE MANAGEMENT/SOCIAL WORK - PATIENT PORTAL LINK FT
You can access the FollowMyHealth Patient Portal offered by [facility] by registering at the following website: [email or web address]. By joining [employer]’s FollowMyHealth portal, you will also be able to view your health information using other applications (apps) compatible with our system.

## 2023-09-13 NOTE — DISCHARGE NOTE NURSING/CASE MANAGEMENT/SOCIAL WORK - NSDCPEFALRISK_GEN_ALL_CORE
For information on Fall & Injury Prevention, visit: [email or web address] OR  [email or web address] OR  https://www.cdc.gov/steadi/patient.html

## 2023-09-13 NOTE — PROGRESS NOTE ADULT - ASSESSMENT
A/P:  Pt is a 83 yr F s/p right hip IMN POD #3, closed treatment of a right proximal humerus fx.     - Pain control/ Analgesia  - DVT ppx Eliquis BID x 6 weeks, SCDs  - PT/OT - wbat RLE, NWB RUE in sling.   - Incentive Spirometer  - GI prophylaxis: Protonix  - FU AM labs - patient with hyponatremia (s/p fluid restriction and held HCTZ) - Likely SIADH.   - Appreciate Medicine and nephrology recs.   - notify Ortho for questions   - Dispo: Planning to TUNDE Hunter PA-C  Orthopedic Surgery  Team Pager #[phone]

## 2023-09-20 ENCOUNTER — RESULT REVIEW (OUTPATIENT)
Age: 83
End: 2023-09-20

## 2023-10-03 ENCOUNTER — APPOINTMENT (OUTPATIENT)
Dept: ORTHOPEDIC SURGERY | Facility: CLINIC | Age: 83
End: 2023-10-03

## 2023-10-09 ENCOUNTER — APPOINTMENT (OUTPATIENT)
Dept: OTOLARYNGOLOGY | Facility: CLINIC | Age: 83
End: 2023-10-09
Payer: MEDICARE

## 2023-10-09 VITALS
DIASTOLIC BLOOD PRESSURE: 66 MMHG | WEIGHT: 115 LBS | SYSTOLIC BLOOD PRESSURE: 158 MMHG | BODY MASS INDEX: 26.61 KG/M2 | HEART RATE: 54 BPM | HEIGHT: 55 IN

## 2023-10-09 DIAGNOSIS — R42 DIZZINESS AND GIDDINESS: ICD-10-CM

## 2023-10-09 DIAGNOSIS — H90.3 SENSORINEURAL HEARING LOSS, BILATERAL: ICD-10-CM

## 2023-10-09 DIAGNOSIS — R51.9 HEADACHE, UNSPECIFIED: ICD-10-CM

## 2023-10-09 PROCEDURE — 92557 COMPREHENSIVE HEARING TEST: CPT

## 2023-10-09 PROCEDURE — 92567 TYMPANOMETRY: CPT

## 2023-10-09 PROCEDURE — 99214 OFFICE O/P EST MOD 30 MIN: CPT

## 2023-10-10 PROBLEM — R42 ACUTE ONSET OF SEVERE VERTIGO: Status: ACTIVE | Noted: 2023-10-10

## 2023-10-10 PROBLEM — H90.3 ASNHL (ASYMMETRICAL SENSORINEURAL HEARING LOSS): Status: ACTIVE | Noted: 2019-05-22

## 2023-10-10 PROBLEM — R51.9 FREQUENT HEADACHES: Status: ACTIVE | Noted: 2023-10-10

## 2023-10-16 ENCOUNTER — RESULT REVIEW (OUTPATIENT)
Age: 83
End: 2023-10-16

## 2023-10-23 ENCOUNTER — RX RENEWAL (OUTPATIENT)
Age: 83
End: 2023-10-23

## 2023-10-24 ENCOUNTER — EMERGENCY (EMERGENCY)
Facility: HOSPITAL | Age: 83
LOS: 1 days | Discharge: HOME | End: 2023-10-24
Attending: EMERGENCY MEDICINE
Payer: MEDICARE

## 2023-10-24 VITALS
SYSTOLIC BLOOD PRESSURE: 167 MMHG | DIASTOLIC BLOOD PRESSURE: 72 MMHG | RESPIRATION RATE: 22 BRPM | HEIGHT: 64 IN | OXYGEN SATURATION: 98 % | TEMPERATURE: 98 F | WEIGHT: 123.9 LBS | HEART RATE: 70 BPM

## 2023-10-24 LAB
BASE EXCESS BLDV CALC-SCNC: 2.8 MMOL/L — SIGNIFICANT CHANGE UP (ref -2–3)
BASE EXCESS BLDV CALC-SCNC: 2.8 MMOL/L — SIGNIFICANT CHANGE UP (ref -2–3)
BASOPHILS # BLD AUTO: 0.05 K/UL — SIGNIFICANT CHANGE UP (ref 0–0.2)
BASOPHILS # BLD AUTO: 0.05 K/UL — SIGNIFICANT CHANGE UP (ref 0–0.2)
BASOPHILS NFR BLD AUTO: 0.7 % — SIGNIFICANT CHANGE UP (ref 0–2)
BASOPHILS NFR BLD AUTO: 0.7 % — SIGNIFICANT CHANGE UP (ref 0–2)
CA-I SERPL-SCNC: 1.22 MMOL/L — SIGNIFICANT CHANGE UP (ref 1.15–1.33)
CA-I SERPL-SCNC: 1.22 MMOL/L — SIGNIFICANT CHANGE UP (ref 1.15–1.33)
CHLORIDE BLDV-SCNC: 101 MMOL/L — SIGNIFICANT CHANGE UP (ref 96–108)
CHLORIDE BLDV-SCNC: 101 MMOL/L — SIGNIFICANT CHANGE UP (ref 96–108)
CO2 BLDV-SCNC: 28 MMOL/L — HIGH (ref 22–26)
CO2 BLDV-SCNC: 28 MMOL/L — HIGH (ref 22–26)
EOSINOPHIL # BLD AUTO: 0.17 K/UL — SIGNIFICANT CHANGE UP (ref 0–0.5)
EOSINOPHIL # BLD AUTO: 0.17 K/UL — SIGNIFICANT CHANGE UP (ref 0–0.5)
EOSINOPHIL NFR BLD AUTO: 2.5 % — SIGNIFICANT CHANGE UP (ref 0–6)
EOSINOPHIL NFR BLD AUTO: 2.5 % — SIGNIFICANT CHANGE UP (ref 0–6)
GAS PNL BLDV: 134 MMOL/L — LOW (ref 136–145)
GAS PNL BLDV: 134 MMOL/L — LOW (ref 136–145)
GAS PNL BLDV: SIGNIFICANT CHANGE UP
GAS PNL BLDV: SIGNIFICANT CHANGE UP
GLUCOSE BLDV-MCNC: 99 MG/DL — SIGNIFICANT CHANGE UP (ref 70–99)
GLUCOSE BLDV-MCNC: 99 MG/DL — SIGNIFICANT CHANGE UP (ref 70–99)
HCO3 BLDV-SCNC: 27 MMOL/L — SIGNIFICANT CHANGE UP (ref 22–29)
HCO3 BLDV-SCNC: 27 MMOL/L — SIGNIFICANT CHANGE UP (ref 22–29)
HCT VFR BLD CALC: 37.9 % — SIGNIFICANT CHANGE UP (ref 34.5–45)
HCT VFR BLD CALC: 37.9 % — SIGNIFICANT CHANGE UP (ref 34.5–45)
HCT VFR BLDA CALC: 38 % — SIGNIFICANT CHANGE UP (ref 34.5–46.5)
HCT VFR BLDA CALC: 38 % — SIGNIFICANT CHANGE UP (ref 34.5–46.5)
HGB BLD CALC-MCNC: 12.8 G/DL — SIGNIFICANT CHANGE UP (ref 11.7–16.1)
HGB BLD CALC-MCNC: 12.8 G/DL — SIGNIFICANT CHANGE UP (ref 11.7–16.1)
HGB BLD-MCNC: 12.4 G/DL — SIGNIFICANT CHANGE UP (ref 11.5–15.5)
HGB BLD-MCNC: 12.4 G/DL — SIGNIFICANT CHANGE UP (ref 11.5–15.5)
IMM GRANULOCYTES NFR BLD AUTO: 0.1 % — SIGNIFICANT CHANGE UP (ref 0–0.9)
IMM GRANULOCYTES NFR BLD AUTO: 0.1 % — SIGNIFICANT CHANGE UP (ref 0–0.9)
LACTATE BLDV-MCNC: 1 MMOL/L — SIGNIFICANT CHANGE UP (ref 0.5–2)
LACTATE BLDV-MCNC: 1 MMOL/L — SIGNIFICANT CHANGE UP (ref 0.5–2)
LYMPHOCYTES # BLD AUTO: 2.32 K/UL — SIGNIFICANT CHANGE UP (ref 1–3.3)
LYMPHOCYTES # BLD AUTO: 2.32 K/UL — SIGNIFICANT CHANGE UP (ref 1–3.3)
LYMPHOCYTES # BLD AUTO: 33.9 % — SIGNIFICANT CHANGE UP (ref 13–44)
LYMPHOCYTES # BLD AUTO: 33.9 % — SIGNIFICANT CHANGE UP (ref 13–44)
MCHC RBC-ENTMCNC: 30.2 PG — SIGNIFICANT CHANGE UP (ref 27–34)
MCHC RBC-ENTMCNC: 30.2 PG — SIGNIFICANT CHANGE UP (ref 27–34)
MCHC RBC-ENTMCNC: 32.7 GM/DL — SIGNIFICANT CHANGE UP (ref 32–36)
MCHC RBC-ENTMCNC: 32.7 GM/DL — SIGNIFICANT CHANGE UP (ref 32–36)
MCV RBC AUTO: 92.4 FL — SIGNIFICANT CHANGE UP (ref 80–100)
MCV RBC AUTO: 92.4 FL — SIGNIFICANT CHANGE UP (ref 80–100)
MONOCYTES # BLD AUTO: 0.64 K/UL — SIGNIFICANT CHANGE UP (ref 0–0.9)
MONOCYTES # BLD AUTO: 0.64 K/UL — SIGNIFICANT CHANGE UP (ref 0–0.9)
MONOCYTES NFR BLD AUTO: 9.3 % — SIGNIFICANT CHANGE UP (ref 2–14)
MONOCYTES NFR BLD AUTO: 9.3 % — SIGNIFICANT CHANGE UP (ref 2–14)
NEUTROPHILS # BLD AUTO: 3.66 K/UL — SIGNIFICANT CHANGE UP (ref 1.8–7.4)
NEUTROPHILS # BLD AUTO: 3.66 K/UL — SIGNIFICANT CHANGE UP (ref 1.8–7.4)
NEUTROPHILS NFR BLD AUTO: 53.5 % — SIGNIFICANT CHANGE UP (ref 43–77)
NEUTROPHILS NFR BLD AUTO: 53.5 % — SIGNIFICANT CHANGE UP (ref 43–77)
NRBC # BLD: 0 /100 WBCS — SIGNIFICANT CHANGE UP (ref 0–0)
NRBC # BLD: 0 /100 WBCS — SIGNIFICANT CHANGE UP (ref 0–0)
PCO2 BLDV: 40 MMHG — SIGNIFICANT CHANGE UP (ref 39–42)
PCO2 BLDV: 40 MMHG — SIGNIFICANT CHANGE UP (ref 39–42)
PH BLDV: 7.44 — HIGH (ref 7.32–7.43)
PH BLDV: 7.44 — HIGH (ref 7.32–7.43)
PLATELET # BLD AUTO: 164 K/UL — SIGNIFICANT CHANGE UP (ref 150–400)
PLATELET # BLD AUTO: 164 K/UL — SIGNIFICANT CHANGE UP (ref 150–400)
PO2 BLDV: 39 MMHG — SIGNIFICANT CHANGE UP (ref 25–45)
PO2 BLDV: 39 MMHG — SIGNIFICANT CHANGE UP (ref 25–45)
POTASSIUM BLDV-SCNC: 4 MMOL/L — SIGNIFICANT CHANGE UP (ref 3.5–5.1)
POTASSIUM BLDV-SCNC: 4 MMOL/L — SIGNIFICANT CHANGE UP (ref 3.5–5.1)
RBC # BLD: 4.1 M/UL — SIGNIFICANT CHANGE UP (ref 3.8–5.2)
RBC # BLD: 4.1 M/UL — SIGNIFICANT CHANGE UP (ref 3.8–5.2)
RBC # FLD: 12.8 % — SIGNIFICANT CHANGE UP (ref 10.3–14.5)
RBC # FLD: 12.8 % — SIGNIFICANT CHANGE UP (ref 10.3–14.5)
SAO2 % BLDV: 68.5 % — SIGNIFICANT CHANGE UP (ref 67–88)
SAO2 % BLDV: 68.5 % — SIGNIFICANT CHANGE UP (ref 67–88)
WBC # BLD: 6.85 K/UL — SIGNIFICANT CHANGE UP (ref 3.8–10.5)
WBC # BLD: 6.85 K/UL — SIGNIFICANT CHANGE UP (ref 3.8–10.5)
WBC # FLD AUTO: 6.85 K/UL — SIGNIFICANT CHANGE UP (ref 3.8–10.5)
WBC # FLD AUTO: 6.85 K/UL — SIGNIFICANT CHANGE UP (ref 3.8–10.5)

## 2023-10-24 PROCEDURE — 99285 EMERGENCY DEPT VISIT HI MDM: CPT

## 2023-10-24 RX ORDER — FAMOTIDINE 10 MG/ML
20 INJECTION INTRAVENOUS ONCE
Refills: 0 | Status: COMPLETED | OUTPATIENT
Start: 2023-10-24 | End: 2023-10-24

## 2023-10-24 RX ADMIN — FAMOTIDINE 20 MILLIGRAM(S): 10 INJECTION INTRAVENOUS at 23:12

## 2023-10-24 RX ADMIN — Medication 30 MILLILITER(S): at 23:12

## 2023-10-24 NOTE — ED PROVIDER NOTE - CLINICAL SUMMARY MEDICAL DECISION MAKING FREE TEXT BOX
Ron, PGY3 - 82yo woman with recent right hip fracture and repair, chronic chest pain and SOB, presenting with pleuritic chest pain that is new. physical exam concerning for right calf induration and swelling, consider DVT and/or PE. labs, CTs, reassess. if workup nonactionable, discharge home, f.u with pcp. *The above represents an initial assessment/impression. Please refer to progress notes for potential changes in patient clinical course*

## 2023-10-24 NOTE — ED ADULT TRIAGE NOTE - TEMPERATURE IN CELSIUS (DEGREES C)
Chief Complaint     discussion of post ablation    HPI:     Patient is a 49 y.o.  presents to follow up lab results, discussion for menorrhagia. S/p endometrial ablation 2018- reports cyclic menses since. Reports menorrhagia, severe dysmenorrhea, menstrual headaches. Denies DVT, Stroke     23:   H&H: 14.9/44.6  ESTRADIOL AND FSH not drawn by lab     LMP: 2023  Frequency: cyclic  Cycle Length:   5-6 days   Flow: heavy  Intermenstrual Bleeding: Yes  Postcoital Bleeding: No  Dysmenorrhea: Yes  Sexually Active: yes   Dyspareunia: No  Contraception: tubal /ablation   H/o STI: Multiple: HSV  Last pap: 2023, neg, neg HPV, neg GC/CZ/TV  H/o    Pelvic US: 2023  FINDINGS:  Uterus: The uterus measures 8.5 x 4.3 x 5.3 cm with the myometrium having heterogeneous echogenic appearance but no worrisome focal masses noted.  The endometrial stripe measures 5 mm in AP thickness on transvaginal imaging.     Ovaries: The right ovary measures 3.0 x 3.0 x 2.4 cm in the left ovary measures 2.9 x 1.7 x 2.0 cm.  A 1.5 cm, benign-appearing cyst with no worrisome perfusion on color-flow mapping is noted originating from the left ovary with no tenderness while scanning over the left ovary or adnexal region.     Miscellaneous: There is no evidence of free fluid within the pelvis and the patient was not tender while scanning over the pelvis or either adnexal region.     Impression:     1. The uterine myometrium has a heterogeneous echogenic appearance with no worrisome focal masses appreciated.  2. A 1.5 cm, benign-appearing, nontender cyst is noted originating from the left ovary.    Past Medical History:   Diagnosis Date    Asthma     Bipolar disorder, unspecified     Chronic cough     COPD (chronic obstructive pulmonary disease)     Fatigue     Hypertensive disorder     Insomnia     Mixed anxiety and depressive disorder     Urge incontinence of urine        Past Surgical History:   Procedure Laterality Date      SECTION       &      SECTION      ENDOMETRIAL ABLATION  06/15/2018    TUBAL LIGATION         Family History   Problem Relation Age of Onset    Heart disease Father     Diabetes Mellitus Mother     Breast cancer Neg Hx     Ovarian cancer Neg Hx     Uterine cancer Neg Hx     Colon cancer Neg Hx        OB History          3    Para   3    Term   3            AB        Living   3         SAB        IAB        Ectopic        Multiple        Live Births   3                 Current Outpatient Medications on File Prior to Visit   Medication Sig Dispense Refill    albuterol (PROVENTIL/VENTOLIN HFA) 90 mcg/actuation inhaler Inhale 1 puff into the lungs every 6 (six) hours as needed for Wheezing. Rescue 18 g 3    ANORO ELLIPTA 62.5-25 mcg/actuation DsDv Take 1 puff by mouth once daily. 1 each 3    dicyclomine (BENTYL) 20 mg tablet TAKE ONE TABLET BY MOUTH TWICE DAILY 60 tablet 1    ergocalciferol, vitamin D2, (VITAMIN D ORAL) Take 50,000 Int'l Units by mouth.      levocetirizine (XYZAL) 5 MG tablet   See Instructions, TAKE ONE TABLET BY MOUTH IN THE EVENING EVERY DAY, # 30 tab(s), 3 Refill(s), Pharmacy: [facility], 160.02, cm, Height/Length Dosing, 10/27/22 7:58:00 CDT, 62.14, kg, Weight Dosing, 10/27/22 7:58:00 CDT      valACYclovir (VALTREX) 500 MG tablet Take 500 mg by mouth.      VRAYLAR 4.5 mg Cap Take 4.5 mg by mouth.      lisinopriL (PRINIVIL,ZESTRIL) 5 MG tablet Take 1 tablet (5 mg total) by mouth once daily. 30 tablet 3    mirabegron (MYRBETRIQ) 25 mg Tb24 ER tablet Take 1 tablet (25 mg total) by mouth once daily. 30 tablet 3    [DISCONTINUED] cariprazine HCl (VRAYLAR ORAL) Take by mouth. Pt unsure of dosage      [DISCONTINUED] valACYclovir (VALTREX) 500 MG tablet Take 500 mg by mouth.       No current facility-administered medications on file prior to visit.       Review of Systems:       Review of Systems   Constitutional:  Negative for chills and fever.   Gastrointestinal:  " Negative for abdominal pain, constipation and diarrhea.   Genitourinary:  Positive for dysmenorrhea, menorrhagia and menstrual problem. Negative for bladder incontinence, decreased libido, dyspareunia, dysuria, flank pain, frequency, genital sores, hematuria, hot flashes, pelvic pain, urgency, vaginal bleeding, vaginal discharge, vaginal pain, urinary incontinence, postcoital bleeding, postmenopausal bleeding, vaginal dryness and vaginal odor.      Physical Exam:    /76   Ht 5' 3" (1.6 m)   Wt 65.6 kg (144 lb 10 oz)   LMP 04/05/2023 (Approximate) Comment: S/P tubal ablation w irregular menses  BMI 25.62 kg/m²     Physical Exam     deferred  Assessment:   1. Menorrhagia with regular cycle    2. Dysmenorrhea             Plan:   1. Menorrhagia with regular cycle    2. Dysmenorrhea    Discussed abnormal uterine bleeding and potential causes including but not limited to: infection, fibroids, other anatomy abnormalities, hormonal changes or abnormalities, pre-cancerous lesions, malignancy.  Dicussed conservative treatment options such as oral contraceptives, transdermal contraceptions, intrauterine device, or tranexamic acid.  We also discuss surgical treatment options such as endometrial ablation or hysterectomy.   Discussed importance of healthy diet, exercise.  Pt desires conservative management at this time via [facility]   Will call office if desires surgical management prior to f/u in 3  months.   Rx: [facility]   RTC 3 months medication f/u   " 36.8

## 2023-10-24 NOTE — ED ADULT NURSE NOTE - OBJECTIVE STATEMENT
83y female PMH HTN, PSH ORIF right hip a few weeks ago c/o of chest pain. Pt reprots that she has had chest pain on and 83y female PMH HTN, PSH ORIF right hip a few weeks ago recently discharged from rehab.  c/o of chest pain. Pt reports that she has had chest pain on and off for about 3 years Pt also reports that "its hard to breath' and reports increased chest pain on inspiration. Pt reports that pt thinks the chest pain is from acid reflux. Pt presents today because chest pain got worse today. Pt denies nausea, vomiting, diarrhea, fevers, abdominal pain, urinary symptoms. pt reports some nasal congestion and sore throat as well. Stretcher in lowest position and locked, appropriate side rails in place, room cleared of clutter and safety hazards, call bell in reach- pt oriented to use, blankets given for comfort

## 2023-10-24 NOTE — ED PROVIDER NOTE - NSFOLLOWUPINSTRUCTIONS_ED_ALL_ED_FT
You were seen in the Emergency Room today because of chest pain worse with breathing. A copy of your results is included in your discharge paperwork.     Please follow-up with your Primary Care Physician within the week.   You can take Tylenol and/or Motrin as directed for pain.     WHAT YOU NEED TO KNOW:  Chest pain can be caused by many different conditions which may or may not be dangerous. Causes include heartburn, lung infections, heart attack, blood clot in lungs, skin infections, strain or damage to muscle, cartilage, or bones, etc. Your chest pain may come and go. It may also be constant.     DISCHARGE INSTRUCTIONS:    Lifestyle   •Rest as directed by your health care provider.  •Do not use any products that contain nicotine or tobacco, such as cigarettes and e-cigarettes. If you need help quitting, ask your health care provider.  •Do not drink alcohol.  •Make healthy lifestyle choices, e.g. getting regular exercise, eating a healthy diet, reducing stress, maintaining a healthy weight.     General instructions   •Pay attention to any changes in your symptoms. Tell your health care provider about them or any new symptoms.  •Avoid any activities that cause chest pain.  •Keep all follow-up visits as told by your health care provider. This is important. This includes visits for any further testing if your chest pain does not go away.      Contact a health care provider if:  •Your chest pain does not go away.  •You feel depressed.  •You have a fever.    Come back to the Emergency Room if:  •Your chest pain gets worse.  •You have a cough that gets worse, or you cough up blood.  •You have severe pain in your abdomen.  •You have sudden, unexplained discomfort in your arms, back, neck, or jaw.  •You have worsening shortness of breath.   •You feel nausea or you vomit.  •You suddenly feel lightheaded or dizzy, or you faint.  •You have severe weakness, or unexplained weakness or fatigue.  •Your heart begins to beat quickly, or it feels like it is skipping beats.    These symptoms may represent a serious problem that is an emergency. Do not wait to see if the symptoms will go away. Get medical help right away. Call 911 and do not drive yourself to the hospital.

## 2023-10-24 NOTE — ED PROVIDER NOTE - PROGRESS NOTE DETAILS
Ron, PGY3 - labs, CTs nonactionable. Patient stable for discharge. Understands the Emergency Room work-up and discharge precautions. Will follow-up with pcp. Aware of breast nodules seen on Ct and need for outpatient followup

## 2023-10-24 NOTE — ED PROVIDER NOTE - PHYSICAL EXAMINATION
Gen: NAD, AOx3, able to make needs known, non-toxic  Head: NCAT  HEENT: EOMI, normal conjunctiva  Lung: CTAB, no respiratory distress, no wheezes/rhonchi/rales B/L, speaking in full sentences, 98% O2 on RA   CV: RRR, pulses bilaterally   Abd: soft, NTND, no guarding  MSK: no visible bony deformities, R>L swelling, right lower leg induration   Neuro: No focal sensory or motor deficits  Skin: Warm, well perfused, no rash  Psych: normal affect

## 2023-10-24 NOTE — ED PROVIDER NOTE - PATIENT PORTAL LINK FT
You can access the FollowMyHealth Patient Portal offered by [facility] by registering at the following website: [email or web address]. By joining [employer]’s FollowMyHealth portal, you will also be able to view your health information using other applications (apps) compatible with our system.

## 2023-10-24 NOTE — ED PROVIDER NOTE - OBJECTIVE STATEMENT
84yo woman with PMH osteoporosis, HTN, right hip fracture s/p open reduction and internal fixation on 9/10/2023, recent discharge from rehab today, presenting with pleuritic chest pain that started today. chronic burning chest pain and SOB x3 years that has been attributed to GERD. Denies fevers, chills, N/V/D, abd pain. 82yo woman with PMH osteoporosis, HTN, right hip fracture s/p open reduction and internal fixation on 9/10/2023, discharge from rehab today, presenting with pleuritic chest pain that started today. chronic burning chest pain, sore throat, and SOB x3 years that has been attributed to GERD. Denies fevers, chills, N/V/D, abd pain. Son at bedside interpreting, also states patient has been complaining of dry throat x3 years since starting valsartan.

## 2023-10-24 NOTE — ED PROVIDER NOTE - ATTENDING CONTRIBUTION TO CARE
Patient Patient Is an 83-year-old female with a history of hypertension, osteoporosis, recent right hip fracture s/p open reduction internal fixation on 9/10/2023, discharged from rehab today and now here for evaluation of mild pleuritic chest pain and a burning left-sided chest discomfort.   Patient is Farsi speaking.  Patient's son is at bedside.  Bedside  was offered but declined.  Patient states she prefers her son to translate. Patient reports she has chronic shortness of breath.  She reports since the surgery she has had right lower extremity swelling.  No history of DVT/PE.  Denies any fevers, chills, nausea, vomiting.  No abdominal pain.    VS noted  Gen. no acute distress, Non toxic   HEENT: EOMI, mmm  Lungs: CTAB/L no C/ W /R   CVS: RRR   Abd; Soft non tender, non distended   Ext:  RLE edema, R > L  Skin: no rash  Neuro AAOx3 non focal clear speech  a/p:  pleuritic chest pain–patient has a  history of recent ORIF of RLE on 9/10/2023.  EKG reviewed and is NSR without ST elevations or depressions.  Low suspicion for ACS.  However given patient's age will check labs and troponin.  Given recent surgery, PE is on the differential.  Will get CTA chest to further evaluate for PE.   We will also check RLE Doppler to rule out DVT. Patient denies any infectious symptoms so pneumonia is much lower on the differential.  Plan to follow labs, CT imaging.  If no acute etiology, consider DC with outpatient follow-up with cardiology.  Sonali Buchanan MD

## 2023-10-24 NOTE — ED PROVIDER NOTE - NS ED ROS FT
GENERAL: No fever, no chills  EYES: No change in vision  HEENT: No trouble swallowing or speaking  CARDIAC: +chest pain  PULMONARY: No cough, +SOB  GI: No abdominal pain, no nausea, no vomiting, no diarrhea, no constipation  : No changes in urination  SKIN: No rashes  NEURO: No headache, no numbness  MSK: No joint pain  Otherwise as HPI or negative.

## 2023-10-24 NOTE — ED ADULT NURSE NOTE - NSFALLRISKINTERV_ED_ALL_ED

## 2023-10-25 VITALS
TEMPERATURE: 98 F | SYSTOLIC BLOOD PRESSURE: 159 MMHG | DIASTOLIC BLOOD PRESSURE: 57 MMHG | RESPIRATION RATE: 20 BRPM | HEART RATE: 59 BPM | OXYGEN SATURATION: 100 %

## 2023-10-25 LAB
ALBUMIN SERPL ELPH-MCNC: 4.1 G/DL — SIGNIFICANT CHANGE UP (ref 3.3–5)
ALBUMIN SERPL ELPH-MCNC: 4.1 G/DL — SIGNIFICANT CHANGE UP (ref 3.3–5)
ALP SERPL-CCNC: 84 U/L — SIGNIFICANT CHANGE UP (ref 40–120)
ALP SERPL-CCNC: 84 U/L — SIGNIFICANT CHANGE UP (ref 40–120)
ALT FLD-CCNC: 17 U/L — SIGNIFICANT CHANGE UP (ref 10–45)
ALT FLD-CCNC: 17 U/L — SIGNIFICANT CHANGE UP (ref 10–45)
ANION GAP SERPL CALC-SCNC: 14 MMOL/L — SIGNIFICANT CHANGE UP (ref 5–17)
ANION GAP SERPL CALC-SCNC: 14 MMOL/L — SIGNIFICANT CHANGE UP (ref 5–17)
AST SERPL-CCNC: 24 U/L — SIGNIFICANT CHANGE UP (ref 10–40)
AST SERPL-CCNC: 24 U/L — SIGNIFICANT CHANGE UP (ref 10–40)
BILIRUB SERPL-MCNC: 0.5 MG/DL — SIGNIFICANT CHANGE UP (ref 0.2–1.2)
BILIRUB SERPL-MCNC: 0.5 MG/DL — SIGNIFICANT CHANGE UP (ref 0.2–1.2)
BUN SERPL-MCNC: 14 MG/DL — SIGNIFICANT CHANGE UP (ref 7–23)
BUN SERPL-MCNC: 14 MG/DL — SIGNIFICANT CHANGE UP (ref 7–23)
CALCIUM SERPL-MCNC: 9.8 MG/DL — SIGNIFICANT CHANGE UP (ref 8.4–10.5)
CALCIUM SERPL-MCNC: 9.8 MG/DL — SIGNIFICANT CHANGE UP (ref 8.4–10.5)
CHLORIDE SERPL-SCNC: 101 MMOL/L — SIGNIFICANT CHANGE UP (ref 96–108)
CHLORIDE SERPL-SCNC: 101 MMOL/L — SIGNIFICANT CHANGE UP (ref 96–108)
CO2 SERPL-SCNC: 23 MMOL/L — SIGNIFICANT CHANGE UP (ref 22–31)
CO2 SERPL-SCNC: 23 MMOL/L — SIGNIFICANT CHANGE UP (ref 22–31)
CREAT SERPL-MCNC: 0.71 MG/DL — SIGNIFICANT CHANGE UP (ref 0.5–1.3)
CREAT SERPL-MCNC: 0.71 MG/DL — SIGNIFICANT CHANGE UP (ref 0.5–1.3)
EGFR: 85 ML/MIN/1.73M2 — SIGNIFICANT CHANGE UP
EGFR: 85 ML/MIN/1.73M2 — SIGNIFICANT CHANGE UP
FLUAV AG NPH QL: SIGNIFICANT CHANGE UP
FLUAV AG NPH QL: SIGNIFICANT CHANGE UP
FLUBV AG NPH QL: SIGNIFICANT CHANGE UP
FLUBV AG NPH QL: SIGNIFICANT CHANGE UP
GLUCOSE SERPL-MCNC: 99 MG/DL — SIGNIFICANT CHANGE UP (ref 70–99)
GLUCOSE SERPL-MCNC: 99 MG/DL — SIGNIFICANT CHANGE UP (ref 70–99)
LIDOCAIN IGE QN: 36 U/L — SIGNIFICANT CHANGE UP (ref 7–60)
LIDOCAIN IGE QN: 36 U/L — SIGNIFICANT CHANGE UP (ref 7–60)
NT-PROBNP SERPL-SCNC: 216 PG/ML — SIGNIFICANT CHANGE UP (ref 0–300)
NT-PROBNP SERPL-SCNC: 216 PG/ML — SIGNIFICANT CHANGE UP (ref 0–300)
POTASSIUM SERPL-MCNC: 3.9 MMOL/L — SIGNIFICANT CHANGE UP (ref 3.5–5.3)
POTASSIUM SERPL-MCNC: 3.9 MMOL/L — SIGNIFICANT CHANGE UP (ref 3.5–5.3)
POTASSIUM SERPL-SCNC: 3.9 MMOL/L — SIGNIFICANT CHANGE UP (ref 3.5–5.3)
POTASSIUM SERPL-SCNC: 3.9 MMOL/L — SIGNIFICANT CHANGE UP (ref 3.5–5.3)
PROT SERPL-MCNC: 6.9 G/DL — SIGNIFICANT CHANGE UP (ref 6–8.3)
PROT SERPL-MCNC: 6.9 G/DL — SIGNIFICANT CHANGE UP (ref 6–8.3)
RSV RNA NPH QL NAA+NON-PROBE: SIGNIFICANT CHANGE UP
RSV RNA NPH QL NAA+NON-PROBE: SIGNIFICANT CHANGE UP
SARS-COV-2 RNA SPEC QL NAA+PROBE: SIGNIFICANT CHANGE UP
SARS-COV-2 RNA SPEC QL NAA+PROBE: SIGNIFICANT CHANGE UP
SODIUM SERPL-SCNC: 138 MMOL/L — SIGNIFICANT CHANGE UP (ref 135–145)
SODIUM SERPL-SCNC: 138 MMOL/L — SIGNIFICANT CHANGE UP (ref 135–145)
TROPONIN T, HIGH SENSITIVITY RESULT: 13 NG/L — SIGNIFICANT CHANGE UP (ref 0–51)

## 2023-10-25 PROCEDURE — 83605 ASSAY OF LACTIC ACID: CPT

## 2023-10-25 PROCEDURE — 85018 HEMOGLOBIN: CPT

## 2023-10-25 PROCEDURE — 82803 BLOOD GASES ANY COMBINATION: CPT

## 2023-10-25 PROCEDURE — 93971 EXTREMITY STUDY: CPT

## 2023-10-25 PROCEDURE — 85025 COMPLETE CBC W/AUTO DIFF WBC: CPT

## 2023-10-25 PROCEDURE — 71045 X-RAY EXAM CHEST 1 VIEW: CPT

## 2023-10-25 PROCEDURE — 80053 COMPREHEN METABOLIC PANEL: CPT

## 2023-10-25 PROCEDURE — 84295 ASSAY OF SERUM SODIUM: CPT

## 2023-10-25 PROCEDURE — 93005 ELECTROCARDIOGRAM TRACING: CPT

## 2023-10-25 PROCEDURE — 82330 ASSAY OF CALCIUM: CPT

## 2023-10-25 PROCEDURE — 85014 HEMATOCRIT: CPT

## 2023-10-25 PROCEDURE — 71275 CT ANGIOGRAPHY CHEST: CPT | Mod: 26,MA

## 2023-10-25 PROCEDURE — 82947 ASSAY GLUCOSE BLOOD QUANT: CPT

## 2023-10-25 PROCEDURE — 87637 SARSCOV2&INF A&B&RSV AMP PRB: CPT

## 2023-10-25 PROCEDURE — 84484 ASSAY OF TROPONIN QUANT: CPT

## 2023-10-25 PROCEDURE — 93971 EXTREMITY STUDY: CPT | Mod: 26,RT

## 2023-10-25 PROCEDURE — 99285 EMERGENCY DEPT VISIT HI MDM: CPT | Mod: 25

## 2023-10-25 PROCEDURE — 96374 THER/PROPH/DIAG INJ IV PUSH: CPT | Mod: XU

## 2023-10-25 PROCEDURE — 83880 ASSAY OF NATRIURETIC PEPTIDE: CPT

## 2023-10-25 PROCEDURE — 71045 X-RAY EXAM CHEST 1 VIEW: CPT | Mod: 26

## 2023-10-25 PROCEDURE — 83690 ASSAY OF LIPASE: CPT

## 2023-10-25 PROCEDURE — 84132 ASSAY OF SERUM POTASSIUM: CPT

## 2023-10-25 PROCEDURE — 71275 CT ANGIOGRAPHY CHEST: CPT | Mod: MA

## 2023-10-25 PROCEDURE — 82435 ASSAY OF BLOOD CHLORIDE: CPT

## 2023-10-25 RX ORDER — ACETAMINOPHEN 500 MG
975 TABLET ORAL ONCE
Refills: 0 | Status: COMPLETED | OUTPATIENT
Start: 2023-10-25 | End: 2023-10-25

## 2023-10-25 RX ADMIN — Medication 975 MILLIGRAM(S): at 04:50

## 2023-11-06 ENCOUNTER — APPOINTMENT (OUTPATIENT)
Dept: CARDIOLOGY | Facility: CLINIC | Age: 83
End: 2023-11-06
Payer: MEDICARE

## 2023-11-06 VITALS
OXYGEN SATURATION: 100 % | SYSTOLIC BLOOD PRESSURE: 152 MMHG | BODY MASS INDEX: 24.87 KG/M2 | WEIGHT: 107 LBS | DIASTOLIC BLOOD PRESSURE: 66 MMHG | HEART RATE: 63 BPM

## 2023-11-06 DIAGNOSIS — R42 DIZZINESS AND GIDDINESS: ICD-10-CM

## 2023-11-06 DIAGNOSIS — R06.4 HYPERVENTILATION: ICD-10-CM

## 2023-11-06 PROCEDURE — 99215 OFFICE O/P EST HI 40 MIN: CPT

## 2023-11-07 ENCOUNTER — APPOINTMENT (OUTPATIENT)
Dept: ENDOCRINOLOGY | Facility: CLINIC | Age: 83
End: 2023-11-07
Payer: MEDICARE

## 2023-11-07 VITALS
DIASTOLIC BLOOD PRESSURE: 72 MMHG | WEIGHT: 107 LBS | BODY MASS INDEX: 24.76 KG/M2 | HEART RATE: 66 BPM | OXYGEN SATURATION: 98 % | SYSTOLIC BLOOD PRESSURE: 112 MMHG | HEIGHT: 55 IN

## 2023-11-07 DIAGNOSIS — S72.001A FRACTURE OF UNSPECIFIED PART OF NECK OF RIGHT FEMUR, INITIAL ENCOUNTER FOR CLOSED FRACTURE: ICD-10-CM

## 2023-11-07 PROCEDURE — 96372 THER/PROPH/DIAG INJ SC/IM: CPT

## 2023-11-07 PROCEDURE — 99214 OFFICE O/P EST MOD 30 MIN: CPT | Mod: 25

## 2023-11-07 RX ORDER — DENOSUMAB 60 MG/ML
60 INJECTION SUBCUTANEOUS
Qty: 1 | Refills: 0 | Status: COMPLETED | OUTPATIENT
Start: 2023-11-07

## 2023-11-07 RX ADMIN — DENOSUMAB 60 MG/ML: 60 INJECTION SUBCUTANEOUS at 00:00

## 2023-11-08 PROBLEM — S72.001A HIP FRACTURE, RIGHT: Status: ACTIVE | Noted: 2023-11-06

## 2023-11-13 ENCOUNTER — EMERGENCY (EMERGENCY)
Facility: HOSPITAL | Age: 83
LOS: 1 days | Discharge: HOME | End: 2023-11-13
Attending: STUDENT IN AN ORGANIZED HEALTH CARE EDUCATION/TRAINING PROGRAM
Payer: MEDICARE

## 2023-11-13 VITALS
HEART RATE: 71 BPM | TEMPERATURE: 98 F | SYSTOLIC BLOOD PRESSURE: 156 MMHG | HEIGHT: 64 IN | OXYGEN SATURATION: 100 % | WEIGHT: 145.06 LBS | RESPIRATION RATE: 18 BRPM | DIASTOLIC BLOOD PRESSURE: 73 MMHG

## 2023-11-13 VITALS
RESPIRATION RATE: 16 BRPM | OXYGEN SATURATION: 100 % | DIASTOLIC BLOOD PRESSURE: 78 MMHG | HEART RATE: 73 BPM | SYSTOLIC BLOOD PRESSURE: 146 MMHG | TEMPERATURE: 98 F

## 2023-11-13 LAB
ALBUMIN SERPL ELPH-MCNC: 4.2 G/DL — SIGNIFICANT CHANGE UP (ref 3.3–5)
ALBUMIN SERPL ELPH-MCNC: 4.2 G/DL — SIGNIFICANT CHANGE UP (ref 3.3–5)
ALP SERPL-CCNC: 97 U/L — SIGNIFICANT CHANGE UP (ref 40–120)
ALP SERPL-CCNC: 97 U/L — SIGNIFICANT CHANGE UP (ref 40–120)
ALT FLD-CCNC: 16 U/L — SIGNIFICANT CHANGE UP (ref 10–45)
ALT FLD-CCNC: 16 U/L — SIGNIFICANT CHANGE UP (ref 10–45)
ANION GAP SERPL CALC-SCNC: 12 MMOL/L — SIGNIFICANT CHANGE UP (ref 5–17)
ANION GAP SERPL CALC-SCNC: 12 MMOL/L — SIGNIFICANT CHANGE UP (ref 5–17)
APTT BLD: 27.7 SEC — SIGNIFICANT CHANGE UP (ref 24.5–35.6)
APTT BLD: 27.7 SEC — SIGNIFICANT CHANGE UP (ref 24.5–35.6)
AST SERPL-CCNC: 26 U/L — SIGNIFICANT CHANGE UP (ref 10–40)
AST SERPL-CCNC: 26 U/L — SIGNIFICANT CHANGE UP (ref 10–40)
BASE EXCESS BLDV CALC-SCNC: 5.2 MMOL/L — HIGH (ref -2–3)
BASE EXCESS BLDV CALC-SCNC: 5.2 MMOL/L — HIGH (ref -2–3)
BASOPHILS # BLD AUTO: 0.06 K/UL — SIGNIFICANT CHANGE UP (ref 0–0.2)
BASOPHILS # BLD AUTO: 0.06 K/UL — SIGNIFICANT CHANGE UP (ref 0–0.2)
BASOPHILS NFR BLD AUTO: 0.8 % — SIGNIFICANT CHANGE UP (ref 0–2)
BASOPHILS NFR BLD AUTO: 0.8 % — SIGNIFICANT CHANGE UP (ref 0–2)
BILIRUB SERPL-MCNC: 0.3 MG/DL — SIGNIFICANT CHANGE UP (ref 0.2–1.2)
BILIRUB SERPL-MCNC: 0.3 MG/DL — SIGNIFICANT CHANGE UP (ref 0.2–1.2)
BUN SERPL-MCNC: 10 MG/DL — SIGNIFICANT CHANGE UP (ref 7–23)
BUN SERPL-MCNC: 10 MG/DL — SIGNIFICANT CHANGE UP (ref 7–23)
CA-I SERPL-SCNC: 1.17 MMOL/L — SIGNIFICANT CHANGE UP (ref 1.15–1.33)
CA-I SERPL-SCNC: 1.17 MMOL/L — SIGNIFICANT CHANGE UP (ref 1.15–1.33)
CALCIUM SERPL-MCNC: 9.2 MG/DL — SIGNIFICANT CHANGE UP (ref 8.4–10.5)
CALCIUM SERPL-MCNC: 9.2 MG/DL — SIGNIFICANT CHANGE UP (ref 8.4–10.5)
CHLORIDE BLDV-SCNC: 98 MMOL/L — SIGNIFICANT CHANGE UP (ref 96–108)
CHLORIDE BLDV-SCNC: 98 MMOL/L — SIGNIFICANT CHANGE UP (ref 96–108)
CHLORIDE SERPL-SCNC: 97 MMOL/L — SIGNIFICANT CHANGE UP (ref 96–108)
CHLORIDE SERPL-SCNC: 97 MMOL/L — SIGNIFICANT CHANGE UP (ref 96–108)
CO2 BLDV-SCNC: 30 MMOL/L — HIGH (ref 22–26)
CO2 BLDV-SCNC: 30 MMOL/L — HIGH (ref 22–26)
CO2 SERPL-SCNC: 24 MMOL/L — SIGNIFICANT CHANGE UP (ref 22–31)
CO2 SERPL-SCNC: 24 MMOL/L — SIGNIFICANT CHANGE UP (ref 22–31)
CREAT SERPL-MCNC: 0.87 MG/DL — SIGNIFICANT CHANGE UP (ref 0.5–1.3)
CREAT SERPL-MCNC: 0.87 MG/DL — SIGNIFICANT CHANGE UP (ref 0.5–1.3)
EGFR: 66 ML/MIN/1.73M2 — SIGNIFICANT CHANGE UP
EGFR: 66 ML/MIN/1.73M2 — SIGNIFICANT CHANGE UP
EOSINOPHIL # BLD AUTO: 0.12 K/UL — SIGNIFICANT CHANGE UP (ref 0–0.5)
EOSINOPHIL # BLD AUTO: 0.12 K/UL — SIGNIFICANT CHANGE UP (ref 0–0.5)
EOSINOPHIL NFR BLD AUTO: 1.6 % — SIGNIFICANT CHANGE UP (ref 0–6)
EOSINOPHIL NFR BLD AUTO: 1.6 % — SIGNIFICANT CHANGE UP (ref 0–6)
GAS PNL BLDV: 129 MMOL/L — LOW (ref 136–145)
GAS PNL BLDV: 129 MMOL/L — LOW (ref 136–145)
GAS PNL BLDV: SIGNIFICANT CHANGE UP
GLUCOSE BLDV-MCNC: 100 MG/DL — HIGH (ref 70–99)
GLUCOSE BLDV-MCNC: 100 MG/DL — HIGH (ref 70–99)
GLUCOSE SERPL-MCNC: 101 MG/DL — HIGH (ref 70–99)
GLUCOSE SERPL-MCNC: 101 MG/DL — HIGH (ref 70–99)
HCO3 BLDV-SCNC: 29 MMOL/L — SIGNIFICANT CHANGE UP (ref 22–29)
HCO3 BLDV-SCNC: 29 MMOL/L — SIGNIFICANT CHANGE UP (ref 22–29)
HCT VFR BLD CALC: 38.9 % — SIGNIFICANT CHANGE UP (ref 34.5–45)
HCT VFR BLD CALC: 38.9 % — SIGNIFICANT CHANGE UP (ref 34.5–45)
HCT VFR BLDA CALC: 41 % — SIGNIFICANT CHANGE UP (ref 34.5–46.5)
HCT VFR BLDA CALC: 41 % — SIGNIFICANT CHANGE UP (ref 34.5–46.5)
HGB BLD CALC-MCNC: 13.5 G/DL — SIGNIFICANT CHANGE UP (ref 11.7–16.1)
HGB BLD CALC-MCNC: 13.5 G/DL — SIGNIFICANT CHANGE UP (ref 11.7–16.1)
HGB BLD-MCNC: 13 G/DL — SIGNIFICANT CHANGE UP (ref 11.5–15.5)
HGB BLD-MCNC: 13 G/DL — SIGNIFICANT CHANGE UP (ref 11.5–15.5)
IMM GRANULOCYTES NFR BLD AUTO: 0.4 % — SIGNIFICANT CHANGE UP (ref 0–0.9)
IMM GRANULOCYTES NFR BLD AUTO: 0.4 % — SIGNIFICANT CHANGE UP (ref 0–0.9)
INR BLD: 0.93 RATIO — SIGNIFICANT CHANGE UP (ref 0.85–1.18)
INR BLD: 0.93 RATIO — SIGNIFICANT CHANGE UP (ref 0.85–1.18)
LACTATE BLDV-MCNC: 1.1 MMOL/L — SIGNIFICANT CHANGE UP (ref 0.5–2)
LACTATE BLDV-MCNC: 1.1 MMOL/L — SIGNIFICANT CHANGE UP (ref 0.5–2)
LYMPHOCYTES # BLD AUTO: 2.86 K/UL — SIGNIFICANT CHANGE UP (ref 1–3.3)
LYMPHOCYTES # BLD AUTO: 2.86 K/UL — SIGNIFICANT CHANGE UP (ref 1–3.3)
LYMPHOCYTES # BLD AUTO: 37.1 % — SIGNIFICANT CHANGE UP (ref 13–44)
LYMPHOCYTES # BLD AUTO: 37.1 % — SIGNIFICANT CHANGE UP (ref 13–44)
MCHC RBC-ENTMCNC: 29.7 PG — SIGNIFICANT CHANGE UP (ref 27–34)
MCHC RBC-ENTMCNC: 29.7 PG — SIGNIFICANT CHANGE UP (ref 27–34)
MCHC RBC-ENTMCNC: 33.4 GM/DL — SIGNIFICANT CHANGE UP (ref 32–36)
MCHC RBC-ENTMCNC: 33.4 GM/DL — SIGNIFICANT CHANGE UP (ref 32–36)
MCV RBC AUTO: 88.8 FL — SIGNIFICANT CHANGE UP (ref 80–100)
MCV RBC AUTO: 88.8 FL — SIGNIFICANT CHANGE UP (ref 80–100)
MONOCYTES # BLD AUTO: 0.62 K/UL — SIGNIFICANT CHANGE UP (ref 0–0.9)
MONOCYTES # BLD AUTO: 0.62 K/UL — SIGNIFICANT CHANGE UP (ref 0–0.9)
MONOCYTES NFR BLD AUTO: 8.1 % — SIGNIFICANT CHANGE UP (ref 2–14)
MONOCYTES NFR BLD AUTO: 8.1 % — SIGNIFICANT CHANGE UP (ref 2–14)
NEUTROPHILS # BLD AUTO: 4.01 K/UL — SIGNIFICANT CHANGE UP (ref 1.8–7.4)
NEUTROPHILS # BLD AUTO: 4.01 K/UL — SIGNIFICANT CHANGE UP (ref 1.8–7.4)
NEUTROPHILS NFR BLD AUTO: 52 % — SIGNIFICANT CHANGE UP (ref 43–77)
NEUTROPHILS NFR BLD AUTO: 52 % — SIGNIFICANT CHANGE UP (ref 43–77)
NRBC # BLD: 0 /100 WBCS — SIGNIFICANT CHANGE UP (ref 0–0)
NRBC # BLD: 0 /100 WBCS — SIGNIFICANT CHANGE UP (ref 0–0)
PCO2 BLDV: 39 MMHG — SIGNIFICANT CHANGE UP (ref 39–42)
PCO2 BLDV: 39 MMHG — SIGNIFICANT CHANGE UP (ref 39–42)
PH BLDV: 7.48 — HIGH (ref 7.32–7.43)
PH BLDV: 7.48 — HIGH (ref 7.32–7.43)
PLATELET # BLD AUTO: 177 K/UL — SIGNIFICANT CHANGE UP (ref 150–400)
PLATELET # BLD AUTO: 177 K/UL — SIGNIFICANT CHANGE UP (ref 150–400)
PO2 BLDV: 41 MMHG — SIGNIFICANT CHANGE UP (ref 25–45)
PO2 BLDV: 41 MMHG — SIGNIFICANT CHANGE UP (ref 25–45)
POTASSIUM BLDV-SCNC: 4.2 MMOL/L — SIGNIFICANT CHANGE UP (ref 3.5–5.1)
POTASSIUM BLDV-SCNC: 4.2 MMOL/L — SIGNIFICANT CHANGE UP (ref 3.5–5.1)
POTASSIUM SERPL-MCNC: 4.1 MMOL/L — SIGNIFICANT CHANGE UP (ref 3.5–5.3)
POTASSIUM SERPL-MCNC: 4.1 MMOL/L — SIGNIFICANT CHANGE UP (ref 3.5–5.3)
POTASSIUM SERPL-SCNC: 4.1 MMOL/L — SIGNIFICANT CHANGE UP (ref 3.5–5.3)
POTASSIUM SERPL-SCNC: 4.1 MMOL/L — SIGNIFICANT CHANGE UP (ref 3.5–5.3)
PROT SERPL-MCNC: 7 G/DL — SIGNIFICANT CHANGE UP (ref 6–8.3)
PROT SERPL-MCNC: 7 G/DL — SIGNIFICANT CHANGE UP (ref 6–8.3)
PROTHROM AB SERPL-ACNC: 10.2 SEC — SIGNIFICANT CHANGE UP (ref 9.5–13)
PROTHROM AB SERPL-ACNC: 10.2 SEC — SIGNIFICANT CHANGE UP (ref 9.5–13)
RBC # BLD: 4.38 M/UL — SIGNIFICANT CHANGE UP (ref 3.8–5.2)
RBC # BLD: 4.38 M/UL — SIGNIFICANT CHANGE UP (ref 3.8–5.2)
RBC # FLD: 12.3 % — SIGNIFICANT CHANGE UP (ref 10.3–14.5)
RBC # FLD: 12.3 % — SIGNIFICANT CHANGE UP (ref 10.3–14.5)
SAO2 % BLDV: 69.4 % — SIGNIFICANT CHANGE UP (ref 67–88)
SAO2 % BLDV: 69.4 % — SIGNIFICANT CHANGE UP (ref 67–88)
SODIUM SERPL-SCNC: 133 MMOL/L — LOW (ref 135–145)
SODIUM SERPL-SCNC: 133 MMOL/L — LOW (ref 135–145)
TROPONIN T, HIGH SENSITIVITY RESULT: 15 NG/L — SIGNIFICANT CHANGE UP (ref 0–51)
TROPONIN T, HIGH SENSITIVITY RESULT: 15 NG/L — SIGNIFICANT CHANGE UP (ref 0–51)
WBC # BLD: 7.7 K/UL — SIGNIFICANT CHANGE UP (ref 3.8–10.5)
WBC # BLD: 7.7 K/UL — SIGNIFICANT CHANGE UP (ref 3.8–10.5)
WBC # FLD AUTO: 7.7 K/UL — SIGNIFICANT CHANGE UP (ref 3.8–10.5)
WBC # FLD AUTO: 7.7 K/UL — SIGNIFICANT CHANGE UP (ref 3.8–10.5)

## 2023-11-13 PROCEDURE — 99285 EMERGENCY DEPT VISIT HI MDM: CPT

## 2023-11-13 PROCEDURE — 84132 ASSAY OF SERUM POTASSIUM: CPT

## 2023-11-13 PROCEDURE — 71045 X-RAY EXAM CHEST 1 VIEW: CPT | Mod: 26

## 2023-11-13 PROCEDURE — 82330 ASSAY OF CALCIUM: CPT

## 2023-11-13 PROCEDURE — 85014 HEMATOCRIT: CPT

## 2023-11-13 PROCEDURE — 85610 PROTHROMBIN TIME: CPT

## 2023-11-13 PROCEDURE — 85730 THROMBOPLASTIN TIME PARTIAL: CPT

## 2023-11-13 PROCEDURE — 85018 HEMOGLOBIN: CPT

## 2023-11-13 PROCEDURE — 84295 ASSAY OF SERUM SODIUM: CPT

## 2023-11-13 PROCEDURE — 96374 THER/PROPH/DIAG INJ IV PUSH: CPT

## 2023-11-13 PROCEDURE — 83605 ASSAY OF LACTIC ACID: CPT

## 2023-11-13 PROCEDURE — 71045 X-RAY EXAM CHEST 1 VIEW: CPT

## 2023-11-13 PROCEDURE — 84484 ASSAY OF TROPONIN QUANT: CPT

## 2023-11-13 PROCEDURE — 80053 COMPREHEN METABOLIC PANEL: CPT

## 2023-11-13 PROCEDURE — 93005 ELECTROCARDIOGRAM TRACING: CPT

## 2023-11-13 PROCEDURE — 99285 EMERGENCY DEPT VISIT HI MDM: CPT | Mod: 25

## 2023-11-13 PROCEDURE — 82947 ASSAY GLUCOSE BLOOD QUANT: CPT

## 2023-11-13 PROCEDURE — 82803 BLOOD GASES ANY COMBINATION: CPT

## 2023-11-13 PROCEDURE — 85025 COMPLETE CBC W/AUTO DIFF WBC: CPT

## 2023-11-13 PROCEDURE — 82435 ASSAY OF BLOOD CHLORIDE: CPT

## 2023-11-13 RX ORDER — FAMOTIDINE 10 MG/ML
20 INJECTION INTRAVENOUS ONCE
Refills: 0 | Status: COMPLETED | OUTPATIENT
Start: 2023-11-13 | End: 2023-11-13

## 2023-11-13 RX ADMIN — Medication 30 MILLILITER(S): at 21:21

## 2023-11-13 RX ADMIN — FAMOTIDINE 20 MILLIGRAM(S): 10 INJECTION INTRAVENOUS at 21:22

## 2023-11-13 RX ADMIN — Medication 30 MILLILITER(S): at 23:06

## 2023-11-13 NOTE — ED PROVIDER NOTE - NSFOLLOWUPINSTRUCTIONS_ED_ALL_ED_FT
You were evaluated in the Emergency Department today for epigastric pain, which is most likely due to irritation of the lining of your stomach. Your symptoms improved with medication in the ED. You can take Mylanta, which is available over the counter, to help manage your symptoms. Avoid spicy or acidic foods.    Please follow up with your primary care physician within two days.    Return to the Emergency Department if you experience shortness of breath, worsening or uncontrolled abdominal or chest pain, headache, light headedness, feeling faint, nausea, vomiting, bloody vomit or stools, black tarry stools, or any other concerning symptoms.    Thank you for choosing us for your care.

## 2023-11-13 NOTE — ED PROVIDER NOTE - PATIENT PORTAL LINK FT
You can access the FollowMyHealth Patient Portal offered by [facility] by registering at the following website: [email or web address]. By joining [employer]’s FollowMyHealth portal, you will also be able to view your health information using other applications (apps) compatible with our system.

## 2023-11-13 NOTE — ED ADULT NURSE NOTE - OBJECTIVE STATEMENT
83 year old female, A&OX4, PMH of chronic gastritis, presenting to ED c/o burning chest discomfort x2 month. States pain has progressively worsened over the last 3 weeks. Patient states pain is worse when she swallows food and drink. Denies SOB, HA, n/v/d, abdominal pain, difficulty urinating, fever and chills at this time. Heart rate and rhythm regular. Respirations clear and equal bilaterally. Abdomen soft, nontender and nondistended. Peripheral pulses strong and equal bilaterally. IV placed and labs drawn. Safety and comfort measures maintained.

## 2023-11-13 NOTE — ED PROVIDER NOTE - RAPID ASSESSMENT
83-year-old female accompanied by her son for the evaluation of central burning chest discomfort that has been going on for 2 months but worsening for about 3 weeks.  Per family member this began while she was in rehab status post hip fracture.  She was discharged on October 23 and was subsequently seen in the emergency department the following day for the same symptoms that bring her here today.  She is also endorsing a sensation of dysphagia to both solids and liquids.  She reports a burning sensation when she swallows anything.  No vomiting or regurgitation but is belching more frequently. 83-year-old female accompanied by her son for the evaluation of central burning chest discomfort that has been going on for 2 months but worsening for about 3 weeks.  Per family member this began while she was in rehab status post hip fracture.  She was discharged on October 23 and was subsequently seen in the emergency department the following day for the same symptoms that bring her here today.  She is also endorsing a sensation of dysphagia to both solids and liquids.  She reports a burning sensation when she swallows anything. No vomiting or regurgitation but is belching more frequently.

## 2023-11-13 NOTE — ED PROVIDER NOTE - OBJECTIVE STATEMENT
84 y/o female PMH osteoporosis, HTN, right hip fracture s/p open reduction and internal fixation on 9/10/2023, presents with chronic burning chest pain, sore throat that has been attributed to GERD. Denies fevers, chills, N/V/D, abd pain. 84 y/o female PMH osteoporosis, HTN, right hip fracture s/p open reduction and internal fixation on 9/10/2023, presents with chronic burning chest pain, sore throat that has been attributed to GERD. She states she was on protonix for several years but was told to switch to pepcid when she was diagnosed with osteoporosis, and since then she has gradually felt increasing burning that runs from her stomach up her esophagus into her throat. Denies fever/chills, is able to tolerate PO, no shortness of breath, dysuria/hematuria.

## 2023-11-13 NOTE — ED PROVIDER NOTE - ATTENDING CONTRIBUTION TO CARE
I, Kit Lopez, have performed a history and physical exam on this patient, and discussed their management with the resident. I have fully participated in the care of this patient. I agree with the history, physical exam, and plan as documented by the resident, unless reported as otherwise below:    83-year-old female with past medical history osteoporosis, hypertension, right hip fracture status post surgical intervention in September 2023, chronic gastritis, presenting to ED for acute on chronic burning chest pain which she states is consistent with her history of gastritis.  Reports increasing pain over the past few weeks with difficulty swallowing foods due to pain.  States that she is able to tolerate liquids although mild pain.  Patient followed by outpatient GI and has plan for follow-up in place later this month, however is concerned today that her appointment may be too far off, in 2 weeks, and for this reason she sought emergency department evaluation.  She denies shortness of breath.  Denies swelling in legs.  Denies significant history of cardiac disease.  Vital signs stable in triage.  Patient resting comfortably in bed, no acute distress.  Tolerating secretions, no stridor.  Vital signs stable in triage.  Mucous membranes moist.  Clear to auscultation bilaterally.  Heart regular rate and rhythm.  Abdomen soft nontender palpation throughout.  EKG demonstrating sinus rhythm, rate 73, no significant changes from previous in October 2023.  Very low concern for cardiac ischemia for etiology for chest symptoms based on examination, history, and EKG.  However will obtain troponin.  Will obtain screening lab work, chest x-ray, provide symptomatic treatment.  Will reassess for further interventions and diagnostics pending initial ED workup.  Likely plan for discharge home if workup without concerning findings and patient feeling improved given patient's ability to tolerate p.o..  And family at bedside in agreement with plan.    Please refer to progress notes/disposition for additional decision making in patient's care.

## 2023-11-13 NOTE — ED PROVIDER NOTE - CLINICAL SUMMARY MEDICAL DECISION MAKING FREE TEXT BOX
84 y/o female PMH osteoporosis, HTN, right hip fracture s/p open reduction and internal fixation on 9/10/2023, presents with chronic burning chest pain, sore throat that has been attributed to GERD. Labs/imaging ordered in triage not significant for any emergent source of pain, patient is not dehydrated, troponin at baseline. She is hemodynamically stable, afebrile, on exam she has a non-tender, non-distended abdomen. Given chronic gastritis pain, discussed with patient/son that this will likely require GI follow up with endoscopy, they can use over the counter Maalox to help with symptoms in the meantime. They agree with plan, she can tolerate PO, okay for d/c.

## 2023-11-13 NOTE — ED PROVIDER NOTE - PHYSICAL EXAMINATION
General: Alert and Orientated x 3. No apparent distress.  Head: Normocephalic and atraumatic.  Eyes: PERRLA with EOMI.   ENT: MMM, Oropharynx clear  Neck: Supple. Trachea midline.   Cardiac: Normal S1 and S2 w/ RRR. No murmurs appreciated.   Pulmonary: CTA bilaterally. No increased WOB.   Abdominal: Soft, non-tender, non-distended  Neurologic: No focal sensory or motor deficits.  Musculoskeletal: Strength appropriate in all 4 extremities for age with no limited ROM.  Skin: Color appropriate for race. Intact, warm, and well-perfused.  Psychiatric: Appropriate mood and affect. No apparent risk to self or others.

## 2023-11-13 NOTE — ED PROVIDER NOTE - CHIEF COMPLAINT
The patient is a 83y Female complaining of  The patient is a 83y Female complaining of epigastric burning

## 2023-11-13 NOTE — ED ADULT NURSE NOTE - COVID-19 RESULT
NEGATIVE
Orientation to room/Bed in low position, brakes on/Side rails x 2 or 4 up, assess large gaps, such that a patient could get extremity or other body part entrapped, use additional safety procedures/Use of non-skid footwear for ambulating patients, use of appropriate size clothing to prevent risk of tripping/Assess eliminations need, assist as needed/Call light is within reach, educate patient/family on its functionality/Environment clear of unused equipment, furniture's in place, clear of hazards/Assess for adequate lighting, leave nightlight on/Patient and family education available to parents and patient/Document fall prevention teaching and include in plan of care/Educate patient/parents of falls protocol precautions/Check patient minimum every 1 hour/Developmentally place patient in appropriate bed/Remove all unused equipment out of the room/Protective barriers to close off spaces, gaps in the bed/Keep door open at all times unless specified isolation precautions are in use/Keep bed in the lowest position, unless patient is directly attended/Document in nursing narrative teaching and plan of care

## 2023-12-16 ENCOUNTER — OUTPATIENT (OUTPATIENT)
Dept: OUTPATIENT SERVICES | Facility: HOSPITAL | Age: 83
LOS: 1 days | End: 2023-12-16
Payer: MEDICARE

## 2023-12-16 ENCOUNTER — APPOINTMENT (OUTPATIENT)
Dept: MAMMOGRAPHY | Facility: IMAGING CENTER | Age: 83
End: 2023-12-16
Payer: MEDICARE

## 2023-12-16 ENCOUNTER — APPOINTMENT (OUTPATIENT)
Dept: ULTRASOUND IMAGING | Facility: IMAGING CENTER | Age: 83
End: 2023-12-16
Payer: MEDICARE

## 2023-12-16 DIAGNOSIS — Z00.8 ENCOUNTER FOR OTHER GENERAL EXAMINATION: ICD-10-CM

## 2023-12-16 PROCEDURE — G0279: CPT | Mod: 26

## 2023-12-16 PROCEDURE — G0279: CPT

## 2023-12-16 PROCEDURE — 77066 DX MAMMO INCL CAD BI: CPT | Mod: 26

## 2023-12-16 PROCEDURE — 77066 DX MAMMO INCL CAD BI: CPT

## 2023-12-21 RX ORDER — VALSARTAN AND HYDROCHLOROTHIAZIDE 80; 12.5 MG/1; MG/1
80-12.5 TABLET, FILM COATED ORAL
Qty: 90 | Refills: 3 | Status: DISCONTINUED | COMMUNITY
Start: 2023-07-03 | End: 2023-12-21

## 2024-01-07 ENCOUNTER — EMERGENCY (EMERGENCY)
Facility: HOSPITAL | Age: 84
LOS: 1 days | Discharge: HOME | End: 2024-01-07
Attending: STUDENT IN AN ORGANIZED HEALTH CARE EDUCATION/TRAINING PROGRAM
Payer: MEDICARE

## 2024-01-07 VITALS
OXYGEN SATURATION: 95 % | HEART RATE: 73 BPM | RESPIRATION RATE: 18 BRPM | TEMPERATURE: 98 F | HEIGHT: 64 IN | WEIGHT: 179.9 LBS | DIASTOLIC BLOOD PRESSURE: 66 MMHG | SYSTOLIC BLOOD PRESSURE: 184 MMHG

## 2024-01-07 PROCEDURE — 99284 EMERGENCY DEPT VISIT MOD MDM: CPT | Mod: GC

## 2024-01-08 VITALS
OXYGEN SATURATION: 98 % | SYSTOLIC BLOOD PRESSURE: 145 MMHG | RESPIRATION RATE: 17 BRPM | DIASTOLIC BLOOD PRESSURE: 78 MMHG | TEMPERATURE: 98 F | HEART RATE: 61 BPM

## 2024-01-08 PROCEDURE — 93005 ELECTROCARDIOGRAM TRACING: CPT

## 2024-01-08 PROCEDURE — 99283 EMERGENCY DEPT VISIT LOW MDM: CPT | Mod: 25

## 2024-01-08 RX ORDER — ACETAMINOPHEN 500 MG
650 TABLET ORAL ONCE
Refills: 0 | Status: COMPLETED | OUTPATIENT
Start: 2024-01-08 | End: 2024-01-08

## 2024-01-08 RX ADMIN — Medication 650 MILLIGRAM(S): at 03:13

## 2024-01-08 NOTE — ED PROVIDER NOTE - NSFOLLOWUPINSTRUCTIONS_ED_ALL_ED_FT
-Please schedule follow up appointment with PCP for further evaluation of symptoms.     -Continue with routine home medications.     -Please take any prescribed medications as instructed by your PMD    - Be sure to return to the ED if you develop new or worsening symptoms. Specific signs and symptoms to be vigilant of: fever or chills, chest pain, difficulty breathing, palpitations, loss of consciousness, headache, vision changes, slurred speech, difficulty swallowing or drooling, facial droop, weakness in the arms or legs, numbness or tingling, abdominal pain, nausea or vomiting, diarrhea, constipation, blood in the stool or urine, pain on urination, difficulty urinating.

## 2024-01-08 NOTE — ED PROVIDER NOTE - OBJECTIVE STATEMENT
84-year-old female past medical history of HTN, HLD, OA presents today for medication misuse today endorsing 1 episode of lightheadedness.  Patient's Son at bedside noted that patient takes amlodipine 10mg AM and losartan 15mg PM.  However patient took amlodipine 10 mg in the evening mistakenly.  Presents here for concerns of possible medication adverse effect.  Denies fever, chills, nausea, vomiting, dizziness, lightheadedness, tinnitus, vertigo chest pain, SOB.

## 2024-01-08 NOTE — ED PROVIDER NOTE - CLINICAL SUMMARY MEDICAL DECISION MAKING FREE TEXT BOX
84-year-old female past medical history of HTN, HLD, OA presents today for medication misuse today endorsing 1 episode of lightheadedness.  Patient's Son at bedside noted that patient takes amlodipine 10mg AM and losartan 15mg PM.  However patient took amlodipine 10 mg in the evening mistakenly.  Presents here for concerns of possible medication adverse effect.  Denies fever, chills, nausea, vomiting, dizziness, lightheadedness, tinnitus, vertigo chest pain, SOB.  Given chief complaint with no current symptoms and unremarkable PE, ambulating well; will EKG to assess for abnormalities.  Labs not warranted at this time given no current complaint.  If EKG WNL likely dispo home with close PCP follow-up and educated on medication use.

## 2024-01-08 NOTE — ED PROVIDER NOTE - ATTENDING CONTRIBUTION TO CARE
I, Scottie Hernandez, performed a history and physical exam of the patient and discussed their management with the resident and/or advanced care provider. I reviewed the resident and/or advanced care provider's note and agree with the documented findings and plan of care. I was present and available for all procedures.    84-year-old female past medical history of HTN, HLD, OA presents today for medication misuse today endorsing 1 episode of lightheadedness.  Patient's Son at bedside noted that patient takes amlodipine 10mg AM and losartan 15mg PM.  However patient took amlodipine 10 mg in the evening mistakenly.  Presents here for concerns of possible medication adverse effect.  Denies fever, chills, nausea, vomiting, dizziness, lightheadedness, tinnitus, vertigo chest pain, SOB.    Well appearing and in NAD, head normal appearing atraumatic, trachea midline, no respiratory distress, lungs cta bilaterally, rrr no murmurs, soft NT ND abdomen, no visible extremity deformities, Alert and oriented, non focal neuro exam, skin warm and dry, normal affect and mood, no leg swelling, calf ttp or jvd    Patient well-appearing reassuring vital signs and EKG and exam otherwise asymptomatic at present will discharge with close return precautions as well as son is able to take patient home safely monitor at home otherwise discussed importance of safe medication use and also endorsed making sure medications are properly use patient and son agreed with plan

## 2024-01-08 NOTE — ED ADULT NURSE NOTE - NSFALLRISKINTERV_ED_ALL_ED
Assistance OOB with selected safe patient handling equipment if applicable/Communicate fall risk and risk factors to all staff, patient, and family/Monitor gait and stability/Provide patient with walking aids/Provide visual cue: yellow wristband, yellow gown, etc/Reinforce activity limits and safety measures with patient and family/Call bell, personal items and telephone in reach/Instruct patient to call for assistance before getting out of bed/chair/stretcher/Non-slip footwear applied when patient is off stretcher/[place] to call system/Physically safe environment - no spills, clutter or unnecessary equipment/Purposeful Proactive Rounding/Room/bathroom lighting operational, light cord in reach Assistance OOB with selected safe patient handling equipment if applicable/Communicate fall risk and risk factors to all staff, patient, and family/Monitor gait and stability/Provide patient with walking aids/Provide visual cue: yellow wristband, yellow gown, etc/Reinforce activity limits and safety measures with patient and family/Call bell, personal items and telephone in reach/Instruct patient to call for assistance before getting out of bed/chair/stretcher/Non-slip footwear applied when patient is off stretcher/[place] to call system/Physically safe environment - no spills, clutter or unnecessary equipment/Purposeful Proactive Rounding/Room/bathroom lighting operational, light cord in reach

## 2024-01-08 NOTE — ED PROVIDER NOTE - PATIENT PORTAL LINK FT
You can access the FollowMyHealth Patient Portal offered by [facility] by registering at the following website: [email or web address]. By joining [employer]’s FollowMyHealth portal, you will also be able to view your health information using other applications (apps) compatible with our system. You can access the FollowMyHealth Patient Portal offered by [facility] by registering at the following website: [email or web address]. By joining [employer]’s FollowMyHealth portal, you will also be able to view your health information using other applications (apps) compatible with our system.

## 2024-02-13 ENCOUNTER — APPOINTMENT (OUTPATIENT)
Dept: CARDIOLOGY | Facility: CLINIC | Age: 84
End: 2024-02-13

## 2024-03-05 ENCOUNTER — APPOINTMENT (OUTPATIENT)
Dept: CARDIOLOGY | Facility: CLINIC | Age: 84
End: 2024-03-05
Payer: MEDICARE

## 2024-03-05 ENCOUNTER — APPOINTMENT (OUTPATIENT)
Age: 84
End: 2024-03-05

## 2024-03-05 ENCOUNTER — NON-APPOINTMENT (OUTPATIENT)
Age: 84
End: 2024-03-05

## 2024-03-05 VITALS
HEART RATE: 59 BPM | RESPIRATION RATE: 17 BRPM | DIASTOLIC BLOOD PRESSURE: 62 MMHG | OXYGEN SATURATION: 100 % | SYSTOLIC BLOOD PRESSURE: 150 MMHG | BODY MASS INDEX: 26.15 KG/M2 | HEIGHT: 55 IN | WEIGHT: 113 LBS

## 2024-03-05 DIAGNOSIS — E55.9 VITAMIN D DEFICIENCY, UNSPECIFIED: ICD-10-CM

## 2024-03-05 DIAGNOSIS — T73.3XXS EXHAUSTION DUE TO EXCESSIVE EXERTION, SEQUELA: ICD-10-CM

## 2024-03-05 DIAGNOSIS — Z00.00 ENCOUNTER FOR GENERAL ADULT MEDICAL EXAMINATION W/OUT ABNORMAL FINDINGS: ICD-10-CM

## 2024-03-05 DIAGNOSIS — F41.9 ANXIETY DISORDER, UNSPECIFIED: ICD-10-CM

## 2024-03-05 DIAGNOSIS — I10 ESSENTIAL (PRIMARY) HYPERTENSION: ICD-10-CM

## 2024-03-05 DIAGNOSIS — E78.5 HYPERLIPIDEMIA, UNSPECIFIED: ICD-10-CM

## 2024-03-05 LAB
25(OH)D3 SERPL-MCNC: 63.9 NG/ML
ALBUMIN SERPL ELPH-MCNC: 4.5 G/DL
ALP BLD-CCNC: 89 U/L
ALT SERPL-CCNC: 21 U/L
ANION GAP SERPL CALC-SCNC: 11 MMOL/L
AST SERPL-CCNC: 26 U/L
BILIRUB SERPL-MCNC: 0.2 MG/DL
BUN SERPL-MCNC: 19 MG/DL
CALCIUM SERPL-MCNC: 9.5 MG/DL
CHLORIDE SERPL-SCNC: 99 MMOL/L
CHOLEST SERPL-MCNC: 136 MG/DL
CO2 SERPL-SCNC: 28 MMOL/L
CREAT SERPL-MCNC: 0.95 MG/DL
EGFR: 59 ML/MIN/1.73M2
GLUCOSE SERPL-MCNC: 117 MG/DL
HCT VFR BLD CALC: 37.7 %
HDLC SERPL-MCNC: 72 MG/DL
HGB BLD-MCNC: 12.4 G/DL
LDLC SERPL CALC-MCNC: 44 MG/DL
MCHC RBC-ENTMCNC: 29.7 PG
MCHC RBC-ENTMCNC: 32.9 GM/DL
MCV RBC AUTO: 90.2 FL
NONHDLC SERPL-MCNC: 65 MG/DL
PLATELET # BLD AUTO: 145 K/UL
POTASSIUM SERPL-SCNC: 4.4 MMOL/L
PROT SERPL-MCNC: 6.5 G/DL
RBC # BLD: 4.18 M/UL
RBC # FLD: 12.6 %
SODIUM SERPL-SCNC: 137 MMOL/L
T4 FREE SERPL-MCNC: 1.2 NG/DL
TRIGL SERPL-MCNC: 120 MG/DL
TSH SERPL-ACNC: 5.88 UIU/ML
WBC # FLD AUTO: 6.59 K/UL

## 2024-03-05 PROCEDURE — 36415 COLL VENOUS BLD VENIPUNCTURE: CPT

## 2024-03-05 PROCEDURE — 99214 OFFICE O/P EST MOD 30 MIN: CPT

## 2024-03-05 PROCEDURE — 93000 ELECTROCARDIOGRAM COMPLETE: CPT

## 2024-03-05 PROCEDURE — G2211 COMPLEX E/M VISIT ADD ON: CPT

## 2024-03-05 RX ORDER — AMLODIPINE BESYLATE 10 MG/1
10 TABLET ORAL DAILY
Qty: 90 | Refills: 3 | Status: DISCONTINUED | COMMUNITY
Start: 2023-12-21 | End: 2024-03-05

## 2024-03-05 RX ORDER — DILTIAZEM HYDROCHLORIDE 240 MG/1
240 CAPSULE, EXTENDED RELEASE ORAL
Qty: 90 | Refills: 3 | Status: ACTIVE | COMMUNITY
Start: 2024-03-05 | End: 1900-01-01

## 2024-03-05 NOTE — DISCUSSION/SUMMARY
[EKG obtained to assist in diagnosis and management of assessed problem(s)] : EKG obtained to assist in diagnosis and management of assessed problem(s) [FreeTextEntry1] : Hypertension- in view of leg swelling, switch from amlodipine to diltiazem 240 mg daily.  Patient's son will check blood pressures at home and get back to if there are any issues. Sleep issues-will try home sleep study for initial evaluation   Bilateral thigh aching-trial of discontinuing rosuvastatin. Osteoarthritis and osteoporosis-continue physical therapy and  program Health maintenance-laboratory data drawn today Return visit 6 months with phone or email follow-up regarding aforementioned issues   Total time of the encounter: 30 minutes which included but was not limited to the following: Face-to-face and non face-to-face time personally spent by the physician preparing to see the patient, obtaining and/or resuming separately obtained history, performing a medically appropriate examination and/or evaluation, counseling and educating the patient/family/caregiver, ordering medications, tests or procedures, referring and communicating with other healthcare professionals, documenting clinical information in the electronic health record, independently interpreting results and communicated results to the patient/family/caregiver and care coordination.

## 2024-03-05 NOTE — REVIEW OF SYSTEMS
[Negative] : Psychiatric [FreeTextEntry6] : Shortness of breath at night as described above [TextEntry] :   Except as noted above... Constitutional: The patient denied headache, fatigue, fever, sweats, loss of appetite or chills Eyes: The patient denied double vision, eye pain, eye discharge, red eyes or itchy eyes ENT: The patient denied ear pain, ear discharge, nasal congestion, nasal discharge, sore throat, enlarged tonsils, hoarseness, neck pain or neck swelling Cardiovascular: The patient denied chest pain, chest discomfort, dizziness, palpitations, fainting  or leg cramps Respiratory: The patient denied shortness of breath, cough, coughing up blood, wheezing, chest congestion or mucous production GI: The patient denied weight gain, weight loss, abdominal pain, nausea, vomiting, diarrhea, constipation, black stools or bloody stools : The patient denied pain on urination, burning with urination, frequent urination or blood in the urine Skin: The patient denied rashes, redness or swelling Neurologic: The patient denied headache, stiff neck, weakness, numbness, difficulty speaking, unsteadiness or numbness/tingling in feet Psychiatric: The patient denied hallucinations, agitation or disorientation Endocrine: The patient denied excessive thirst, excessive urination, cold intolerance or heat intolerance Hematologic: The patient denied easy bruisability or pallor Allergic/Immunologic: The patient denied runny nose, recurrent infections, hives or pruritis Musculoskeletal: The patient denied arthritic pains, muscle weakness Extremities: The patient denied clubbing, cyanosis  [FreeTextEntry7] : Presents with chronic abdominal bloating.  Patient takes Gas-X with no improvement.  Patient will try Align

## 2024-03-05 NOTE — REASON FOR VISIT
[FreeTextEntry1] : The patient is in for follow-up of hypertension, osteoporosis, osteoarthritis, hypercholesterolemia and recent difficulties with bilateral thigh pain.  The patient describes pain in the thighs which are 7 out of 10 in severity when she walks for more than a short distance and when she stands for extended period.  If she rubs the legs and sits for a while the symptoms go away.  She experiences this at least 2-3 times a week.  Will try discontinuing rosuvastatin for a few weeks to see whether that improves the symptoms.  Patient continues to have pain in the right groin from surgery on her right hip but does not want to consider removing the screws.  She has been having ongoing difficulties with leg swelling.  Her blood pressures are generally well-controlled.  Will try switching from amlodipine to Cardizem.

## 2024-03-05 NOTE — PHYSICAL EXAM
[Well Nourished] : well nourished [Well Developed] : well developed [Normal Conjunctiva] : normal conjunctiva [No Acute Distress] : no acute distress [Normal Venous Pressure] : normal venous pressure [No Carotid Bruit] : no carotid bruit [No Rub] : no rub [Normal S1, S2] : normal S1, S2 [No Gallop] : no gallop [Clear Lung Fields] : clear lung fields [No Respiratory Distress] : no respiratory distress  [Soft] : abdomen soft [Good Air Entry] : good air entry [No Masses/organomegaly] : no masses/organomegaly [Non Tender] : non-tender [Normal Gait] : normal gait [Normal Bowel Sounds] : normal bowel sounds [No Cyanosis] : no cyanosis [No Edema] : no edema [No Varicosities] : no varicosities [No Clubbing] : no clubbing [No Rash] : no rash [No Skin Lesions] : no skin lesions [No Focal Deficits] : no focal deficits [Moves all extremities] : moves all extremities [Alert and Oriented] : alert and oriented [Normal Speech] : normal speech [Normal memory] : normal memory [de-identified] : With a 1/6 stock ejection murmur at left sternal border

## 2024-03-06 LAB
ESTIMATED AVERAGE GLUCOSE: 114 MG/DL
HBA1C MFR BLD HPLC: 5.6 %

## 2024-03-12 ENCOUNTER — APPOINTMENT (OUTPATIENT)
Age: 84
End: 2024-03-12
Payer: COMMERCIAL

## 2024-03-12 PROCEDURE — D0140: CPT

## 2024-03-12 PROCEDURE — D0330 PANORAMIC RADIOGRAPHIC IMAGE: CPT

## 2024-03-19 ENCOUNTER — APPOINTMENT (OUTPATIENT)
Age: 84
End: 2024-03-19
Payer: COMMERCIAL

## 2024-03-19 PROCEDURE — D0210: CPT

## 2024-03-19 PROCEDURE — PIP: CUSTOM

## 2024-03-19 PROCEDURE — D0150: CPT

## 2024-03-21 LAB
M TB IFN-G BLD-IMP: NEGATIVE
QUANTIFERON TB PLUS MITOGEN MINUS NIL: >10 IU/ML
QUANTIFERON TB PLUS NIL: 0.02 IU/ML
QUANTIFERON TB PLUS TB1 MINUS NIL: 0.02 IU/ML
QUANTIFERON TB PLUS TB2 MINUS NIL: 0.01 IU/ML

## 2024-04-05 NOTE — ED PROVIDER NOTE - NS ED MD DISPO DISCHARGE
Patient being assessed today for follow-up of migraine.  She reports that she averages 1 migraine per month associated with nausea, photophobia, aura.  She does utilize Ubrelvy which is typically effective with 1 dose.  Denies any side effects.  Would like to continue the Ubrelvy as she has been taking it.  2 samples provided to patient today.  Discussed role of medicine, importance of taking medications, potential risks, benefits, and precautions to be taken.  Reviewed sleep hygiene and dietary modifications.  Follow-up in 1 year or sooner if needed.    This note was created with voice recognition software and was not corrected for typographical or grammatical errors  
Home

## 2024-04-15 NOTE — ED ADULT NURSE NOTE - BREATH SOUNDS, LEFT
Patient verbalized understanding of discharge instructions and medications prescribed.  Denies questions or concerns at this time .   clear

## 2024-04-22 NOTE — HISTORY OF PRESENT ILLNESS
[FreeTextEntry1] : Patient returns for a follow up visit for osteoporosis. Since the last visit pt experienced a fall on Sept. 6 2023 and had a right hip fracture. Pt was hospitalized at [facility] and had surgery the following day with a martín being inserted into right hip. Pt was in rehab for recovery and was discharged 10 weeks ago. Pt had symptoms of hyperventilation, feels like choking on food after coming home from rehab. Unclear behind cause of symptoms. Pt had tooth extractions 6 months ago.   Calcium: 9.8, Creatinine: 0.71  Pt has been told of osteoporosis for many years. She took Fosamax for several years in the distant past. She stopped medicine at the time of the left humerus fracture. She was off therapy for 5 or more years. BMD January 2017 Spine -2.3 total hip -2.4, femoral neck -2.7. Restarted Fosamax, taking correctly not tolerating well some UGI sx. No thigh pain. Last DDS few mons ago. No interval fractures. Pt switched to Actonel 1/2018, but later transitioned to Boniva, did not tolerate (aches and pains), switched back to Actonel. Took correctly, tolerated well. BMD 12/2020 markedly decreased despite Rx. Pt started Prolia 1/2021. Tolerating well. No thigh pain, no interval fx. Last DDS 3 within the last 6 months ago. No ONJ.  Had cataract surgery. PSTs normal.

## 2024-04-22 NOTE — ASSESSMENT
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  Pt has been told of osteoporosis for many years. She took Fosamax for several years in the distant past. She stopped medicine at the time of the left humerus fracture. She was off therapy for 5 or more years.  Bone mineral density January 2017 low. Restarted Fosamax, took correctly not tolerated well some UGI sx. No thigh pain. Pt switched to Actonel 1/2018, but later transitioned to Boniva, did not tolerate (aches and pains), switched back to Actonel. Took correctly, tolerated well. BMD 12/2020 markedly decreased despite Rx. Options of medical therapy for osteoporosis were again reviewed in great detail. Pt started Prolia 1/2021. Tolerating well.  Patient suffered a right hip fracture September 2023.  Tolerated ORIF well   options of anabolic therapy discussed Continuing Prolia considering good bone density.  Right hip scar healing well. Uses 4 point walker   Calcium: 9.8, Creatinine: 0.71.  F/u in 6 months and repeat bmd.

## 2024-04-22 NOTE — END OF VISIT
[FreeTextEntry3] : This note was authored by the medical scribe for me. I have reviewed, edited, and revised the note as needed. I am in agreement with the exam findings, imaging findings, and treatment plan.  Matthew Concepcion MD

## 2024-04-22 NOTE — ADDENDUM
[FreeTextEntry1] : April 22, 2024 patient appears to require dental extraction.  Last dose of Prolia in November 2023.  This would be an appropriate time to do dental extraction and then delay Prolia for at least 1 additional month post extraction . Matthew Concepcion MD

## 2024-04-23 ENCOUNTER — APPOINTMENT (OUTPATIENT)
Age: 84
End: 2024-04-23
Payer: COMMERCIAL

## 2024-04-23 PROCEDURE — D0220: CPT

## 2024-04-23 PROCEDURE — D0140: CPT

## 2024-04-26 ENCOUNTER — APPOINTMENT (OUTPATIENT)
Age: 84
End: 2024-04-26
Payer: COMMERCIAL

## 2024-04-26 PROCEDURE — D7140: CPT

## 2024-05-03 ENCOUNTER — APPOINTMENT (OUTPATIENT)
Age: 84
End: 2024-05-03
Payer: COMMERCIAL

## 2024-05-03 PROCEDURE — 99024 POSTOP FOLLOW-UP VISIT: CPT

## 2024-05-07 ENCOUNTER — APPOINTMENT (OUTPATIENT)
Age: 84
End: 2024-05-07
Payer: COMMERCIAL

## 2024-05-07 PROCEDURE — D4342: CPT

## 2024-05-13 ENCOUNTER — APPOINTMENT (OUTPATIENT)
Dept: ENDOCRINOLOGY | Facility: CLINIC | Age: 84
End: 2024-05-13
Payer: MEDICARE

## 2024-05-13 VITALS
DIASTOLIC BLOOD PRESSURE: 62 MMHG | HEART RATE: 46 BPM | OXYGEN SATURATION: 99 % | SYSTOLIC BLOOD PRESSURE: 152 MMHG | WEIGHT: 113 LBS | BODY MASS INDEX: 25.07 KG/M2 | HEIGHT: 56.3 IN

## 2024-05-13 PROCEDURE — 77080 DXA BONE DENSITY AXIAL: CPT | Mod: GA

## 2024-05-13 PROCEDURE — ZZZZZ: CPT

## 2024-05-13 PROCEDURE — 99213 OFFICE O/P EST LOW 20 MIN: CPT

## 2024-05-14 ENCOUNTER — APPOINTMENT (OUTPATIENT)
Age: 84
End: 2024-05-14
Payer: COMMERCIAL

## 2024-05-14 PROCEDURE — D4342: CPT

## 2024-05-15 NOTE — PROCEDURE
[FreeTextEntry1] : BMD 05/13/2024 compared to 2022, assess response to medication  Total Hip: -3.1, osteoporosis, -5.2%, stable vs 2020 Spine: -2.6, osteoporosis, ns Femoral Neck: -3.2, osteoporosis, -8.7%, stable vs 2020 Forearm: -1.7, osteopenia, ns  Bone Density March 2022  Spine -2.5 osteoporosis +5.1% Total Hip -2.8 osteoporosis +6.0% Femoral Neck -2.8 osteoporosis +11.8% Proximal Radius -1.5 osteoporosis no significant change  BMD  December 30, 2020 Indication: Compared to 2018 Lumbar spine 1-4  BMD: 0.736 g/cm2  T score -2.8  WHO Classification osteoporosis -3.2% Total hip   BMD: 0.563 g/cm2  T score -3.1  WHO Classification osteoporosis -10.2%   Femoral neck  BMD: 0.481 g/cm2  T score -3.3  WHO Classification -12.4%    Proximal wrist  BMD: 0.619 g/cm2  T score -1.3  WHO Classification osteopenia no significant change

## 2024-05-15 NOTE — PHYSICAL EXAM
[Alert] : alert [Well Nourished] : well nourished [No Acute Distress] : no acute distress [Well Developed] : well developed [Normal Sclera/Conjunctiva] : normal sclera/conjunctiva [EOMI] : extra ocular movement intact [No Proptosis] : no proptosis [Thyroid Not Enlarged] : the thyroid was not enlarged [No Thyroid Nodules] : no palpable thyroid nodules [Clear to Auscultation] : lungs were clear to auscultation bilaterally [Normal S1, S2] : normal S1 and S2 [Normal Rate] : heart rate was normal [Regular Rhythm] : with a regular rhythm [No Edema] : no peripheral edema [Normal Bowel Sounds] : normal bowel sounds [Not Tender] : non-tender [Not Distended] : not distended [Soft] : abdomen soft [Normal Anterior Cervical Nodes] : no anterior cervical lymphadenopathy [No Spinal Tenderness] : no spinal tenderness [Spine Straight] : spine straight [No Stigmata of Cushings Syndrome] : no stigmata of Cushings Syndrome [Normal Gait] : normal gait [Normal Reflexes] : deep tendon reflexes were 2+ and symmetric [No Tremors] : no tremors [Oriented x3] : oriented to person, place, and time [de-identified] : left lower molar healing [de-identified] : 2/6 systolic murmur [de-identified] : R hip surgical incision well healed

## 2024-05-15 NOTE — END OF VISIT
[FreeTextEntry3] :  I, Yolie Montes, authored this note working as a medical scribe for Dr. Concepcion.  05/13/2024 .  This note was authored by the medical scribe for me. I have reviewed, edited, and revised the note as needed. I am in agreement with the exam findings, imaging findings, and treatment plan.  Matthew Concepcion MD

## 2024-05-15 NOTE — ASSESSMENT
[Denosumab Therapy] : Risks  and benefits of denosumab therapy were discussed with the patient including eczema, cellulitis, osteonecrosis of the jaw and atypical femur fractures [FreeTextEntry1] : 85 y/o female with postmenopausal osteoporosis.  Pt has been told of osteoporosis for many years. She took Fosamax for several years in the distant past. She stopped medicine at the time of the left humerus fracture. She was off therapy for 5 or more years. Bone mineral density January 2017 low. Restarted Fosamax, took correctly not tolerated well some UGI sx. No thigh pain. Pt switched to Actonel 1/2018, but later transitioned to Boniva, did not tolerate (aches and pains), switched back to Actonel. Took correctly, tolerated well. BMD 12/2020 markedly decreased despite Rx. Options of medical therapy for osteoporosis were again reviewed in great detail. Pt started Prolia 1/2021. Tolerating well. Patient suffered a right hip fracture September 2023. Tolerated ORIF well. Last dose of Prolia in November 2023.   Had recent tooth extraction in 04/2024 and is planning for dentures, delaying dose of Prolia to June 2024.    BMD 05/2024 shows sl worsening osteoporosis in the total hip and fem neck, stable osteoporosis in the spine, and stable osteopenia in the prox radius. Bone density overall stable versus 2020.  Options reviewed with patient.  It is reasonable to continue Prolia but repeat bone density in 1 year. BMD results reviewed with patient.   Right hip scar healing well. Uses 4 point walker  Labs 05/2024: Calcium: 9.5, Creatinine: 0.95. F/u in 1 month for Prolia shot.

## 2024-05-15 NOTE — HISTORY OF PRESENT ILLNESS
[Alendronate (Fosomax)] : Alendronate [Risedronate (Actonel)] : Risedronate [Denosumab (Prolia)] : Denosumab [FreeTextEntry1] : No interval fractures, surgeries, or hospitalizations. No changes to medications. UTD with DDS, just had recent tooth extraction in 04/2024, patient is planning for dentures, reports slow healing and slightly tender.  Pt has been told of osteoporosis for many years. She took Fosamax for several years in the distant past. She stopped medicine at the time of the left humerus fracture. She was off therapy for 5 or more years. BMD January 2017 Spine -2.3 total hip -2.4, femoral neck -2.7. Restarted Fosamax, taking correctly not tolerating well some UGI sx. No thigh pain. Last DDS few mons ago. No interval fractures. Pt switched to Actonel 1/2018, but later transitioned to Boniva, did not tolerate (aches and pains), switched back to Actonel. Took correctly, tolerated well. BMD 12/2020 markedly decreased despite Rx. Pt started Prolia 1/2021. Tolerating well. No thigh pain. Pt experienced a fall on Sept. 6 2023 and had a right hip fracture. Pt was hospitalized at [facility] and had surgery the following day with a martín being inserted into right hip. Pt was in rehab for recovery. Last dose of Prolia in November 2023. Had recent tooth extraction in 04/2024, delaying dose of Prolia to June 2024.   BMD 05/2024 shows sl worsening osteoporosis in the total hip and fem neck, stable osteoporosis in the spine, and stable osteopenia in the prox radius.  Pt had symptoms of hyperventilation, feels like choking on food after coming home from rehab. Unclear behind cause of symptoms. Had cataract surgery. PSTs normal.

## 2024-05-28 ENCOUNTER — APPOINTMENT (OUTPATIENT)
Age: 84
End: 2024-05-28
Payer: COMMERCIAL

## 2024-05-28 PROCEDURE — D9310: CPT

## 2024-05-31 RX ORDER — FAMOTIDINE 20 MG/1
20 TABLET, FILM COATED ORAL
Qty: 180 | Refills: 3 | Status: ACTIVE | COMMUNITY
Start: 2017-03-09 | End: 1900-01-01

## 2024-05-31 RX ORDER — ESCITALOPRAM OXALATE 5 MG/1
5 TABLET ORAL DAILY
Qty: 90 | Refills: 3 | Status: ACTIVE | COMMUNITY
Start: 2024-03-05 | End: 1900-01-01

## 2024-06-03 ENCOUNTER — APPOINTMENT (OUTPATIENT)
Dept: ENDOCRINOLOGY | Facility: CLINIC | Age: 84
End: 2024-06-03
Payer: MEDICARE

## 2024-06-03 DIAGNOSIS — M81.0 AGE-RELATED OSTEOPOROSIS W/OUT CURRENT PATHOLOGICAL FRACTURE: ICD-10-CM

## 2024-06-03 PROCEDURE — 96372 THER/PROPH/DIAG INJ SC/IM: CPT

## 2024-06-03 RX ORDER — DENOSUMAB 60 MG/ML
60 INJECTION SUBCUTANEOUS
Qty: 1 | Refills: 0 | Status: COMPLETED | OUTPATIENT
Start: 2024-05-30

## 2024-06-06 ENCOUNTER — APPOINTMENT (OUTPATIENT)
Age: 84
End: 2024-06-06
Payer: COMMERCIAL

## 2024-06-06 PROCEDURE — 99024 POSTOP FOLLOW-UP VISIT: CPT

## 2024-06-13 ENCOUNTER — NON-APPOINTMENT (OUTPATIENT)
Age: 84
End: 2024-06-13

## 2024-06-16 RX ORDER — LOSARTAN POTASSIUM 50 MG/1
50 TABLET, FILM COATED ORAL TWICE DAILY
Qty: 180 | Refills: 3 | Status: ACTIVE | COMMUNITY
Start: 2023-12-21

## 2024-07-16 ENCOUNTER — APPOINTMENT (OUTPATIENT)
Age: 84
End: 2024-07-16
Payer: COMMERCIAL

## 2024-07-16 PROCEDURE — PIP: CUSTOM

## 2024-07-30 ENCOUNTER — APPOINTMENT (OUTPATIENT)
Age: 84
End: 2024-07-30
Payer: COMMERCIAL

## 2024-07-30 PROCEDURE — PLD1: CUSTOM

## 2024-07-30 PROCEDURE — D5214: CPT

## 2024-08-19 ENCOUNTER — APPOINTMENT (OUTPATIENT)
Age: 84
End: 2024-08-19
Payer: COMMERCIAL

## 2024-08-19 PROCEDURE — D2750: CPT

## 2024-08-19 PROCEDURE — PIP: CUSTOM

## 2024-09-09 NOTE — ED ADULT NURSE NOTE - NSICDXPASTMEDICALHX_GEN_ALL_CORE_FT
PAST MEDICAL HISTORY:  Arthritis     GERD (gastroesophageal reflux disease)     HTN (hypertension)     Hypercholesteremia     Thrombocytopenia     
Previously positive

## 2024-09-10 ENCOUNTER — NON-APPOINTMENT (OUTPATIENT)
Age: 84
End: 2024-09-10

## 2024-09-10 ENCOUNTER — APPOINTMENT (OUTPATIENT)
Dept: CARDIOLOGY | Facility: CLINIC | Age: 84
End: 2024-09-10
Payer: MEDICARE

## 2024-09-10 VITALS
HEART RATE: 53 BPM | OXYGEN SATURATION: 92 % | BODY MASS INDEX: 14.9 KG/M2 | WEIGHT: 110 LBS | SYSTOLIC BLOOD PRESSURE: 140 MMHG | HEIGHT: 72 IN | DIASTOLIC BLOOD PRESSURE: 60 MMHG

## 2024-09-10 DIAGNOSIS — E78.5 HYPERLIPIDEMIA, UNSPECIFIED: ICD-10-CM

## 2024-09-10 DIAGNOSIS — R26.81 UNSTEADINESS ON FEET: ICD-10-CM

## 2024-09-10 DIAGNOSIS — R42 DIZZINESS AND GIDDINESS: ICD-10-CM

## 2024-09-10 DIAGNOSIS — I10 ESSENTIAL (PRIMARY) HYPERTENSION: ICD-10-CM

## 2024-09-10 DIAGNOSIS — E55.9 VITAMIN D DEFICIENCY, UNSPECIFIED: ICD-10-CM

## 2024-09-10 PROCEDURE — 36415 COLL VENOUS BLD VENIPUNCTURE: CPT

## 2024-09-10 PROCEDURE — 93000 ELECTROCARDIOGRAM COMPLETE: CPT

## 2024-09-10 PROCEDURE — 99214 OFFICE O/P EST MOD 30 MIN: CPT

## 2024-09-10 PROCEDURE — G2211 COMPLEX E/M VISIT ADD ON: CPT

## 2024-09-10 RX ORDER — HYDRALAZINE HYDROCHLORIDE 25 MG/1
25 TABLET ORAL
Qty: 180 | Refills: 3 | Status: ACTIVE | COMMUNITY
Start: 2024-09-10 | End: 1900-01-01

## 2024-09-10 NOTE — REASON FOR VISIT
[FreeTextEntry1] : The patient is here today for follow-up of hypertension, osteoarthritis, hypercholesterolemia and ongoing difficulties with lightheadedness and dizziness.  The patient describes some difficulties with balance but she also describes several episodes of lightheadedness several times a week for unclear reasons.  She was recently seen by neurology who noted some degree of orthostatic hypotension.  The patient also has chronic sinus bradycardia with heart rates in the low 40s.  In addition, patient's blood pressures continue to be somewhat labile.  Will try switching from diltiazem to hydralazine (which may increase heart rate) patient's son will check blood pressures regularly and will get back to me in a few weeks.  Further titration according to those readings.

## 2024-09-10 NOTE — DISCUSSION/SUMMARY
[FreeTextEntry1] : Hypertension-try switching from diltiazem to hydralazine to 25 mg p.o. twice daily to see whether heart rate can increase and some of the dizziness can improve.  Patient somewhat check blood pressures and will email me readings in a few weeks.  There is clearly a component of autonomic dysfunction.  Consider midodrine depending on frequency and duration of symptoms of lightheadedness and dizziness.  Patient was seen by neurology and had a CAT scan (patient did have a fall several months ago but the CAT scan was negative). Osteoarthritis and osteoporosis-continue physical therapy and  program Health maintenance-laboratory data drawn today Return visit 6 months with phone or email follow-up regarding aforementioned issues   Total time of the encounter: 30 minutes which included but was not limited to the following: Face-to-face and non face-to-face time personally spent by the physician preparing to see the patient, obtaining and/or resuming separately obtained history, performing a medically appropriate examination and/or evaluation, counseling and educating the patient/family/caregiver, ordering medications, tests or procedures, referring and communicating with other healthcare professionals, documenting clinical information in the electronic health record, independently interpreting results and communicated results to the patient/family/caregiver and care coordination.  [EKG obtained to assist in diagnosis and management of assessed problem(s)] : EKG obtained to assist in diagnosis and management of assessed problem(s)

## 2024-09-10 NOTE — PHYSICAL EXAM
[Well Developed] : well developed [Well Nourished] : well nourished [No Acute Distress] : no acute distress [Normal Conjunctiva] : normal conjunctiva [Normal Venous Pressure] : normal venous pressure [No Carotid Bruit] : no carotid bruit [Normal S1, S2] : normal S1, S2 [No Rub] : no rub [No Gallop] : no gallop [Clear Lung Fields] : clear lung fields [Good Air Entry] : good air entry [No Respiratory Distress] : no respiratory distress  [Soft] : abdomen soft [Non Tender] : non-tender [No Masses/organomegaly] : no masses/organomegaly [Normal Bowel Sounds] : normal bowel sounds [Normal Gait] : normal gait [No Edema] : no edema [No Cyanosis] : no cyanosis [No Clubbing] : no clubbing [No Varicosities] : no varicosities [No Rash] : no rash [No Skin Lesions] : no skin lesions [Moves all extremities] : moves all extremities [No Focal Deficits] : no focal deficits [Normal Speech] : normal speech [Alert and Oriented] : alert and oriented [Normal memory] : normal memory [de-identified] : With a 1/6 stock ejection murmur at left sternal border [TextEntry] : General/Constitutional: WD/ WN in NAD H: NC/AT Eyes:  PERRL, sclerae and conjunctivae normal without jaundice or xanthelasma; ENMT:normal teeth, gums and palate with no petechiae, pallor or cyanosis Neck: w/o JVD, thyromegaly or adenopathy; normal venous contour Respiratory: clear to auscultation, normal respiratory effort with no retractions or use of accessory respiratory muscles Heart: [id number], regular rate, normal S1, S2 with 1/6 early peaking systolic ejection murmur left upper sternal border but no rubs, gallops, heaves or thrills Vascular exam: normal carotid upstrokes without carotid or abdominal bruits. 2+/2+ pulses to posterior tibialis and dorsalis pedis Abdomen: soft, nontender, bowels sounds normal without hepatomegaly or splenomegaly, masses or bruits Musculoskeletal:without significant kyphosis or scoliosis Extremities: w/o CCE, good capillary filling Skin: no stasis changes; no ulcers  Neuro: AA and O x 3; no focal neurologic deficits Psych: normal mood and affect

## 2024-09-10 NOTE — REVIEW OF SYSTEMS
[Negative] : Heme/Lymph [FreeTextEntry6] : Shortness of breath at night as described above [FreeTextEntry7] : Presents with chronic abdominal bloating.  Patient takes Gas-X with no improvement.  Patient will try Align [TextEntry] :   Except as noted above... Constitutional: The patient denied headache, fatigue, fever, sweats, loss of appetite or chills Eyes: The patient denied double vision, eye pain, eye discharge, red eyes or itchy eyes ENT: The patient denied ear pain, ear discharge, nasal congestion, nasal discharge, sore throat, enlarged tonsils, hoarseness, neck pain or neck swelling Cardiovascular: The patient denied chest pain, chest discomfort, dizziness, palpitations, fainting  or leg cramps Respiratory: The patient denied shortness of breath, cough, coughing up blood, wheezing, chest congestion or mucous production GI: The patient denied weight gain, weight loss, abdominal pain, nausea, vomiting, diarrhea, constipation, black stools or bloody stools : The patient denied pain on urination, burning with urination, frequent urination or blood in the urine Skin: The patient denied rashes, redness or swelling Neurologic: The patient denied headache, stiff neck, weakness, numbness, difficulty speaking, unsteadiness or numbness/tingling in feet Psychiatric: The patient denied hallucinations, agitation or disorientation Endocrine: The patient denied excessive thirst, excessive urination, cold intolerance or heat intolerance Hematologic: The patient denied easy bruisability or pallor Allergic/Immunologic: The patient denied runny nose, recurrent infections, hives or pruritis Musculoskeletal: The patient denied arthritic pains, muscle weakness Extremities: The patient denied clubbing, cyanosis

## 2024-09-11 LAB
25(OH)D3 SERPL-MCNC: 61 NG/ML
ALBUMIN SERPL ELPH-MCNC: 4 G/DL
ALP BLD-CCNC: 88 U/L
ALT SERPL-CCNC: 16 U/L
ANION GAP SERPL CALC-SCNC: 9 MMOL/L
AST SERPL-CCNC: 24 U/L
BILIRUB SERPL-MCNC: 0.3 MG/DL
BUN SERPL-MCNC: 20 MG/DL
CALCIUM SERPL-MCNC: 9.1 MG/DL
CHLORIDE SERPL-SCNC: 103 MMOL/L
CHOLEST SERPL-MCNC: 215 MG/DL
CO2 SERPL-SCNC: 28 MMOL/L
CREAT SERPL-MCNC: 0.8 MG/DL
EGFR: 73 ML/MIN/1.73M2
ESTIMATED AVERAGE GLUCOSE: 111 MG/DL
GLUCOSE SERPL-MCNC: 103 MG/DL
HBA1C MFR BLD HPLC: 5.5 %
HCT VFR BLD CALC: 41 %
HDLC SERPL-MCNC: 76 MG/DL
HGB BLD-MCNC: 13.2 G/DL
LDLC SERPL CALC-MCNC: 126 MG/DL
MCHC RBC-ENTMCNC: 30.4 PG
MCHC RBC-ENTMCNC: 32.2 GM/DL
MCV RBC AUTO: 94.5 FL
NONHDLC SERPL-MCNC: 139 MG/DL
PLATELET # BLD AUTO: 158 K/UL
POTASSIUM SERPL-SCNC: 4.4 MMOL/L
PROT SERPL-MCNC: 6.4 G/DL
RBC # BLD: 4.34 M/UL
RBC # FLD: 12.5 %
SODIUM SERPL-SCNC: 141 MMOL/L
T4 FREE SERPL-MCNC: 1.2 NG/DL
TRIGL SERPL-MCNC: 77 MG/DL
TSH SERPL-ACNC: 4.86 UIU/ML
WBC # FLD AUTO: 5.6 K/UL

## 2024-09-12 ENCOUNTER — APPOINTMENT (OUTPATIENT)
Age: 84
End: 2024-09-12
Payer: MEDICARE

## 2024-09-12 PROCEDURE — D9110: CPT

## 2024-09-17 ENCOUNTER — APPOINTMENT (OUTPATIENT)
Age: 84
End: 2024-09-17
Payer: MEDICARE

## 2024-09-17 ENCOUNTER — APPOINTMENT (OUTPATIENT)
Dept: ORTHOPEDIC SURGERY | Facility: CLINIC | Age: 84
End: 2024-09-17
Payer: MEDICARE

## 2024-09-17 VITALS — WEIGHT: 110 LBS | HEIGHT: 56 IN | BODY MASS INDEX: 24.75 KG/M2

## 2024-09-17 DIAGNOSIS — M17.11 UNILATERAL PRIMARY OSTEOARTHRITIS, RIGHT KNEE: ICD-10-CM

## 2024-09-17 DIAGNOSIS — M17.12 UNILATERAL PRIMARY OSTEOARTHRITIS, LEFT KNEE: ICD-10-CM

## 2024-09-17 PROCEDURE — 99213 OFFICE O/P EST LOW 20 MIN: CPT | Mod: 25

## 2024-09-17 PROCEDURE — 20610 DRAIN/INJ JOINT/BURSA W/O US: CPT | Mod: 50

## 2024-09-17 PROCEDURE — PLD2: CUSTOM

## 2024-09-17 PROCEDURE — INCR: CUSTOM

## 2024-09-17 NOTE — PROCEDURE
[de-identified] : Procedure Note:  Anatomic Location:  Right  Knee  Diagnosis:  Arthritis  Procedure:  Injection of 2cc  of Marcaine 0.25% plain and Kenalog 40, 1 cc  Local Spray: Ethyl Chloride.   Patient has consented for the procedure.  Injection  through a lateral parapatella approach.  Patient tolerated the procedure well.  Patient instructed to call the office if any reaction, fever, chills, increased erythema or swelling.   [phone].  Procedure Note:  Anatomic Location:  Left Knee  Diagnosis:  Arthritis  Procedure:  Injection of 2cc  of Marcaine 0.25% plain and Kenalog 40, 1 cc  Local Spray: Ethyl Chloride.   Patient has consented for the procedure.  Injection  through a lateral parapatella approach.  Patient tolerated the procedure well.  Patient instructed to call the office if any reaction, fever, chills, increased erythema or swelling.   [phone].

## 2024-09-17 NOTE — PHYSICAL EXAM
[de-identified] : The patient is walking well as far as her right hip after fracture.  She does have some decreased motion on that side.  She has a flexion right hip 90 degrees left hip 120 degrees abduction right hip 45 degrees left hip 60 degrees, adduction right 35 degrees left 45 degrees, external rotation right 20 degrees left 45 degrees internal rotation right 10 degrees left 20 degrees  Her knee exam is not changed. right Knee has 10 to 95 degrees of motion with good medial  lateral and anterior posterior stability.  There is moderate effusion.  There is no Baker's cyst.  There is  patellofemoral crepitus.  She does have a significant osteoarthritis as was previously seen by x-ray and she has pain over the medial joint line as well as pain with compression of her patella.  She has severe bilateral medial osteoarthritis.   Left  Knee   has 0 to 120 degrees of motion with good medial lateral and anterior posterior stability.  In this knee also the patient has pain over the medial joint line and some pain with compression of her patella.  She does have patellofemoral crepitus.  She has good medial lateral and anterior posterior stability as before.

## 2024-09-17 NOTE — HISTORY OF PRESENT ILLNESS
[de-identified] : 82 y/o female presents for follow up evaluation of pain in B/L knees, which has been presents for several years, which she manages with injection.  Right knee pain > left knee. Pain is worse with walking and going up and down stairs.  Last had injections in B/L knees on 3/7/23 which provided relief. She was due for an injection but sustained a hip fx which was operated by Dr. Martinez on 9/10/2023, so she was not able to come by for the injection.  She fell last September 2023 and fractured her right hip she has an intertrochanteric fracture treated with a gamma type nail by Dr. Werner. She would like to have another injection in both knees today.

## 2024-09-17 NOTE — DISCUSSION/SUMMARY
[de-identified] : She is doing very well with her hip replacement although she has some loss of motion.  As far as her knee she definitely wants to stay at conservative measures she has severe bilateral osteoarthritis and will continue taking Tylenol and over-the-counter nonsteroidal anti-inflammatory if necessary.  She tolerated the local injections well.  Return visit in 3 to 6 months.  The risk of anti-inflammatory medicines were discussed with the patient.  If there is any sign of rash or allergy it should be stopped immediately.  There is a risk of causing GI irritation which would also be a reason to stop the medication.  Some of the anti-inflammatory medications can cause a rise in blood pressure.  If this is an issue then please speak to her internist.  Injection risk factors  The possible risks discussed include pain, swelling, heat, muscle stiffness and fulness and rare infection, allergy, and face flushing.     A total of  25 minutes were spent in direct care in this patient , including  reviewing previous notes, counseling the patient,  ordering tests , reviewing the medication plan, and documenting the findings in the note. This excludes other separate billable services.

## 2024-10-07 ENCOUNTER — APPOINTMENT (OUTPATIENT)
Dept: OTOLARYNGOLOGY | Facility: CLINIC | Age: 84
End: 2024-10-07

## 2024-10-07 VITALS
WEIGHT: 112 LBS | BODY MASS INDEX: 25.19 KG/M2 | HEIGHT: 56 IN | HEART RATE: 55 BPM | DIASTOLIC BLOOD PRESSURE: 75 MMHG | SYSTOLIC BLOOD PRESSURE: 150 MMHG

## 2024-10-07 PROCEDURE — 92584 ELECTROCOCHLEOGRAPHY: CPT

## 2024-10-07 PROCEDURE — 99214 OFFICE O/P EST MOD 30 MIN: CPT

## 2024-10-07 PROCEDURE — 92567 TYMPANOMETRY: CPT

## 2024-10-07 PROCEDURE — 92557 COMPREHENSIVE HEARING TEST: CPT

## 2024-10-08 ENCOUNTER — APPOINTMENT (OUTPATIENT)
Age: 84
End: 2024-10-08
Payer: MEDICARE

## 2024-10-08 PROCEDURE — PLD3: CUSTOM

## 2024-10-17 ENCOUNTER — NON-APPOINTMENT (OUTPATIENT)
Age: 84
End: 2024-10-17

## 2024-10-17 RX ORDER — TRIAMTERENE AND HYDROCHLOROTHIAZIDE 25; 37.5 MG/1; MG/1
37.5-25 TABLET ORAL
Qty: 30 | Refills: 2 | Status: ACTIVE | COMMUNITY
Start: 2024-10-17 | End: 1900-01-01

## 2024-10-22 PROBLEM — R26.89 IMBALANCE: Status: ACTIVE | Noted: 2024-10-22

## 2024-11-05 ENCOUNTER — INPATIENT (INPATIENT)
Facility: HOSPITAL | Age: 84
LOS: 2 days | Discharge: NURSING, REHAB OR LONG-TERM CARE | DRG: 260 | End: 2024-11-08
Attending: INTERNAL MEDICINE | Admitting: STUDENT IN AN ORGANIZED HEALTH CARE EDUCATION/TRAINING PROGRAM
Payer: MEDICARE

## 2024-11-05 ENCOUNTER — APPOINTMENT (OUTPATIENT)
Age: 84
End: 2024-11-05
Payer: MEDICARE

## 2024-11-05 VITALS
DIASTOLIC BLOOD PRESSURE: 60 MMHG | HEIGHT: 66 IN | SYSTOLIC BLOOD PRESSURE: 166 MMHG | OXYGEN SATURATION: 100 % | RESPIRATION RATE: 18 BRPM | TEMPERATURE: 98 F | WEIGHT: 199.96 LBS | HEART RATE: 56 BPM

## 2024-11-05 DIAGNOSIS — R42 DIZZINESS AND GIDDINESS: ICD-10-CM

## 2024-11-05 LAB
ALBUMIN SERPL ELPH-MCNC: 4.3 G/DL — SIGNIFICANT CHANGE UP (ref 3.3–5)
ALP SERPL-CCNC: 84 U/L — SIGNIFICANT CHANGE UP (ref 40–120)
ALT FLD-CCNC: 23 U/L — SIGNIFICANT CHANGE UP (ref 10–45)
ANION GAP SERPL CALC-SCNC: 16 MMOL/L — SIGNIFICANT CHANGE UP (ref 5–17)
APTT BLD: 25 SEC — SIGNIFICANT CHANGE UP (ref 24.5–35.6)
AST SERPL-CCNC: 37 U/L — SIGNIFICANT CHANGE UP (ref 10–40)
BASOPHILS # BLD AUTO: 0.06 K/UL — SIGNIFICANT CHANGE UP (ref 0–0.2)
BASOPHILS NFR BLD AUTO: 0.5 % — SIGNIFICANT CHANGE UP (ref 0–2)
BILIRUB SERPL-MCNC: 0.4 MG/DL — SIGNIFICANT CHANGE UP (ref 0.2–1.2)
BUN SERPL-MCNC: 32 MG/DL — HIGH (ref 7–23)
CALCIUM SERPL-MCNC: 10 MG/DL — SIGNIFICANT CHANGE UP (ref 8.4–10.5)
CHLORIDE SERPL-SCNC: 90 MMOL/L — LOW (ref 96–108)
CO2 SERPL-SCNC: 25 MMOL/L — SIGNIFICANT CHANGE UP (ref 22–31)
CREAT SERPL-MCNC: 1.12 MG/DL — SIGNIFICANT CHANGE UP (ref 0.5–1.3)
EGFR: 49 ML/MIN/1.73M2 — LOW
EOSINOPHIL # BLD AUTO: 0.14 K/UL — SIGNIFICANT CHANGE UP (ref 0–0.5)
EOSINOPHIL NFR BLD AUTO: 1.2 % — SIGNIFICANT CHANGE UP (ref 0–6)
GLUCOSE SERPL-MCNC: 130 MG/DL — HIGH (ref 70–99)
HCT VFR BLD CALC: 34.4 % — LOW (ref 34.5–45)
HGB BLD-MCNC: 11.7 G/DL — SIGNIFICANT CHANGE UP (ref 11.5–15.5)
IMM GRANULOCYTES NFR BLD AUTO: 0.4 % — SIGNIFICANT CHANGE UP (ref 0–0.9)
INR BLD: 0.91 RATIO — SIGNIFICANT CHANGE UP (ref 0.85–1.16)
LYMPHOCYTES # BLD AUTO: 1.61 K/UL — SIGNIFICANT CHANGE UP (ref 1–3.3)
LYMPHOCYTES # BLD AUTO: 14 % — SIGNIFICANT CHANGE UP (ref 13–44)
MCHC RBC-ENTMCNC: 30.2 PG — SIGNIFICANT CHANGE UP (ref 27–34)
MCHC RBC-ENTMCNC: 34 G/DL — SIGNIFICANT CHANGE UP (ref 32–36)
MCV RBC AUTO: 88.9 FL — SIGNIFICANT CHANGE UP (ref 80–100)
MONOCYTES # BLD AUTO: 0.8 K/UL — SIGNIFICANT CHANGE UP (ref 0–0.9)
MONOCYTES NFR BLD AUTO: 7 % — SIGNIFICANT CHANGE UP (ref 2–14)
NEUTROPHILS # BLD AUTO: 8.83 K/UL — HIGH (ref 1.8–7.4)
NEUTROPHILS NFR BLD AUTO: 76.9 % — SIGNIFICANT CHANGE UP (ref 43–77)
NRBC # BLD: 0 /100 WBCS — SIGNIFICANT CHANGE UP (ref 0–0)
PLATELET # BLD AUTO: 134 K/UL — LOW (ref 150–400)
POTASSIUM SERPL-MCNC: 4.5 MMOL/L — SIGNIFICANT CHANGE UP (ref 3.5–5.3)
POTASSIUM SERPL-SCNC: 4.5 MMOL/L — SIGNIFICANT CHANGE UP (ref 3.5–5.3)
PROT SERPL-MCNC: 7.3 G/DL — SIGNIFICANT CHANGE UP (ref 6–8.3)
PROTHROM AB SERPL-ACNC: 10.5 SEC — SIGNIFICANT CHANGE UP (ref 9.9–13.4)
RBC # BLD: 3.87 M/UL — SIGNIFICANT CHANGE UP (ref 3.8–5.2)
RBC # FLD: 11.8 % — SIGNIFICANT CHANGE UP (ref 10.3–14.5)
SODIUM SERPL-SCNC: 131 MMOL/L — LOW (ref 135–145)
TROPONIN T, HIGH SENSITIVITY RESULT: 12 NG/L — SIGNIFICANT CHANGE UP (ref 0–51)
WBC # BLD: 11.49 K/UL — HIGH (ref 3.8–10.5)
WBC # FLD AUTO: 11.49 K/UL — HIGH (ref 3.8–10.5)

## 2024-11-05 PROCEDURE — 70450 CT HEAD/BRAIN W/O DYE: CPT | Mod: 26

## 2024-11-05 PROCEDURE — 99285 EMERGENCY DEPT VISIT HI MDM: CPT | Mod: FS

## 2024-11-05 PROCEDURE — 71045 X-RAY EXAM CHEST 1 VIEW: CPT | Mod: 26

## 2024-11-05 PROCEDURE — 73551 X-RAY EXAM OF FEMUR 1: CPT | Mod: 26,LT

## 2024-11-05 PROCEDURE — 73502 X-RAY EXAM HIP UNI 2-3 VIEWS: CPT | Mod: 26,LT

## 2024-11-05 PROCEDURE — 72125 CT NECK SPINE W/O DYE: CPT | Mod: 26

## 2024-11-05 PROCEDURE — PLD4: CUSTOM

## 2024-11-05 RX ORDER — MORPHINE SULFATE 30 MG/1
2 TABLET, EXTENDED RELEASE ORAL ONCE
Refills: 0 | Status: DISCONTINUED | OUTPATIENT
Start: 2024-11-05 | End: 2024-11-05

## 2024-11-05 RX ORDER — ACETAMINOPHEN 500 MG
1000 TABLET ORAL ONCE
Refills: 0 | Status: COMPLETED | OUTPATIENT
Start: 2024-11-05 | End: 2024-11-05

## 2024-11-05 RX ORDER — SODIUM CHLORIDE 9 MG/ML
1000 INJECTION, SOLUTION INTRAMUSCULAR; INTRAVENOUS; SUBCUTANEOUS ONCE
Refills: 0 | Status: COMPLETED | OUTPATIENT
Start: 2024-11-05 | End: 2024-11-05

## 2024-11-05 RX ADMIN — SODIUM CHLORIDE 1000 MILLILITER(S): 9 INJECTION, SOLUTION INTRAMUSCULAR; INTRAVENOUS; SUBCUTANEOUS at 20:52

## 2024-11-05 RX ADMIN — MORPHINE SULFATE 2 MILLIGRAM(S): 30 TABLET, EXTENDED RELEASE ORAL at 20:50

## 2024-11-05 RX ADMIN — Medication 400 MILLIGRAM(S): at 20:52

## 2024-11-05 NOTE — ED PROVIDER NOTE - PROGRESS NOTE DETAILS
Called from tele that pt had an episode of 2nd deg HB. Tele strip placed in chart. Called Cards, they will come eval. Hospitalist, Dr. Soto, informed as well. Edwardo Murry PA-C

## 2024-11-05 NOTE — ED ADULT NURSE NOTE - NSFALLRISKINTERV_ED_ALL_ED

## 2024-11-05 NOTE — ED PROVIDER NOTE - CLINICAL SUMMARY MEDICAL DECISION MAKING FREE TEXT BOX
Elderly woman comes to ER status post dizziness with fall complaining of pain left hip concerns for ACS/bradycardia/dysrhythmia, also concerns for traumatic injury/hip fracture.  Plan ACS workup EKG, troponin, chest x-ray, telemetry monitoring, basic labs/electrolytes, urine.  Plan traumatic workup plan CT head, pelvis and left hip femur.  Reassess probable admission.

## 2024-11-05 NOTE — ED ADULT NURSE NOTE - OBJECTIVE STATEMENT
84-year-old female past medical history of HTN, HLD, OA, left hip replacement (s/p fall sept 2023) BIBEMS after unwitnessed fall at home r/t episode of dizziness.  pt denies head strike denies LOC. complaining of right hip pain - externally rotated and shorter then left. pain 8/10. aox4 independent  at baseline continent x 2.

## 2024-11-05 NOTE — ED PROVIDER NOTE - OBJECTIVE STATEMENT
85 yo F with a PMH of HLD, HTN, GERD, sp R hip ORIF from home presents sp fall. Pt states she has been having intermittent episodes of dizziness. Prior to fall today she got dizzy. Fell on to the left side of her body. C/o of left hip and left lower extremity pain. Could not bear weight on extremity. +head trauma, ?LOC. Denies chest pain, abd pain, neck or back pain, headache, dizziness.

## 2024-11-05 NOTE — CONSULT NOTE ADULT - ASSESSMENT
Assessment/Plan:  84yFemale with left hip IT fx    - plan for OR for operative fixation tomorrow   - NPO except medications  - IVF while NPO  - NWB LLE , bedrest  - Please hold chemical DVT ppx, SCDs okay  - FU Preop labs  - Medical comanagement appreciated, please document clearance  - Please document cardiac clearance for OR given heart block  - discussed with attending who agrees with above     Lucinda Parada MD  Orthopaedic Surgery Resident    For all questions, please reach out via the following numbers for the on-call resident; do not reach out via Teams.  [facility] [place] [id number]  [place]        [id number]  [facility]  [phone]/ [phone]

## 2024-11-05 NOTE — CONSULT NOTE ADULT - SUBJECTIVE AND OBJECTIVE BOX
84yFemale PMH HTN, HLD and new heart block c/o L hip pain s/p possible syncopal fall. Per patient felt dizzy and lightheaded and fell down hitting hip and head. Patient denies LOC. Patient denies numbness or tingling in the LLE. Patient denies any other injuries.    ROS: 10 point review of systems otherwise negative unless noted in HPI    PMH:  HTN (hypertension)    Hypercholesteremia    GERD (gastroesophageal reflux disease)    Arthritis    Thrombocytopenia      PSH:  No significant past surgical history      AH:  No Known Allergies    Meds: See med rec    T(C): 36.7 (11-05-24 @ 20:12)  HR: 56 (11-05-24 @ 20:12)  BP: 166/60 (11-05-24 @ 20:12)  RR: 18 (11-05-24 @ 20:12)  SpO2: 100% (11-05-24 @ 20:12)  Wt(kg): --                        11.7   11.49 )-----------( 134      ( 05 Nov 2024 21:00 )             34.4     11-05    131[L]  |  90[L]  |  32[H]  ----------------------------<  130[H]  4.5   |  25  |  1.12    Ca    10.0      05 Nov 2024 21:00    TPro  7.3  /  Alb  4.3  /  TBili  0.4  /  DBili  x   /  AST  37  /  ALT  23  /  AlkPhos  84  11-05    PT/INR - ( 05 Nov 2024 21:00 )   PT: 10.5 sec;   INR: 0.91 ratio         PTT - ( 05 Nov 2024 21:00 )  PTT:25.0 sec  Urinalysis Basic - ( 05 Nov 2024 21:00 )    Color: x / Appearance: x / SG: x / pH: x  Gluc: 130 mg/dL / Ketone: x  / Bili: x / Urobili: x   Blood: x / Protein: x / Nitrite: x   Leuk Esterase: x / RBC: x / WBC x   Sq Epi: x / Non Sq Epi: x / Bacteria: x        PE  Gen: NAD, alert and oriented  Resp: Unlabored breathing  LLE: Skin intact, no ecchymosis,        SILT DP/SP/ Bita/Saph,        +EHL/FHL/TA/Gastroc,        Knee/ankle painless ROM,        hip ROM limited 2/2 pain,       DP+,        soft compartments, no calf ttp,        +log roll.      Secondary:  No TTP over bony landmarks, SILT BL, ROM intact BL, distal pulses palpable.    Imaging:  XR demonstrating L hip IT fracture

## 2024-11-05 NOTE — ED PROVIDER NOTE - PHYSICAL EXAMINATION
CONSTITUTIONAL: Uncomfortable appearing.  ENT: Airway patent, moist mucous membranes.   EYES: Pupils equal, round and reactive to light. EOMI. Conjunctiva normal appearing.   CARDIOVASCULAR: Normal rate, regular rhythm.  Heart sounds S1, S2.  DP pulses palpable.   RESPIRATORY: Breath sounds clear and equal bilaterally.   GASTROINTESTINAL: Abdomen soft, non-tender, not distended.  MUSCULOSKELETAL: Spine appears normal. No midline TTP or obvious step offs. +L hip TTP. LLE shortened and externally rotated.   NEUROLOGICAL: Alert and oriented x3, no focal deficits.

## 2024-11-05 NOTE — ED PROVIDER NOTE - ATTENDING APP SHARED VISIT CONTRIBUTION OF CARE
PMD Frandy Doe  84-year-old female past medical history GERD, hypertension, hyperlipidemia, thrombocytopenia, arthritis, status post ORIF right hip fracture comes ER complains of fall at home.  Patient states she got dizzy fell to the ground striking left hip, without chest pain, palpitations, short of breath.  Patient complaining of pain left hip unable to weight-bear.  EMS to ER.  Physical exam elderly female awake and alert obvious uncomfortable  HEENT normocephalic/atraumatic.  Chest clear A&P.  Abdomen soft positive bowel sounds.  CV no rubs gallops murmur patient heart rate 70.  MSK left leg shortened and externally rotated sensation intact.  Femi Brown MD, Facep

## 2024-11-05 NOTE — ED PROVIDER NOTE - ED STEMI HIDDEN
hide
Detail Level: Detailed
Consent: The patient's consent was obtained including but not limited to risks of crusting, scabbing, blistering, scarring, darker or lighter pigmentary change, recurrence, incomplete removal and infection.
Add 52 Modifier (Optional): no
Medical Necessity Information: It is in your best interest to select a reason for this procedure from the list below. All of these items fulfill various CMS LCD requirements except the new and changing color options.
Medical Necessity Clause: This procedure was medically necessary because the lesions that were treated were:
Show Topical Anesthesia Variable?: Yes
Post-Care Instructions: I reviewed with the patient in detail post-care instructions. Patient is to wear sunprotection, and avoid picking at any of the treated lesions. Pt may apply Vaseline to crusted or scabbing areas.

## 2024-11-06 ENCOUNTER — RESULT REVIEW (OUTPATIENT)
Age: 84
End: 2024-11-06

## 2024-11-06 DIAGNOSIS — Z29.9 ENCOUNTER FOR PROPHYLACTIC MEASURES, UNSPECIFIED: ICD-10-CM

## 2024-11-06 DIAGNOSIS — D69.6 THROMBOCYTOPENIA, UNSPECIFIED: ICD-10-CM

## 2024-11-06 DIAGNOSIS — E78.5 HYPERLIPIDEMIA, UNSPECIFIED: ICD-10-CM

## 2024-11-06 DIAGNOSIS — E87.1 HYPO-OSMOLALITY AND HYPONATREMIA: ICD-10-CM

## 2024-11-06 DIAGNOSIS — S72.142A DISPLACED INTERTROCHANTERIC FRACTURE OF LEFT FEMUR, INITIAL ENCOUNTER FOR CLOSED FRACTURE: ICD-10-CM

## 2024-11-06 DIAGNOSIS — K21.9 GASTRO-ESOPHAGEAL REFLUX DISEASE WITHOUT ESOPHAGITIS: ICD-10-CM

## 2024-11-06 DIAGNOSIS — Z98.890 OTHER SPECIFIED POSTPROCEDURAL STATES: Chronic | ICD-10-CM

## 2024-11-06 DIAGNOSIS — R42 DIZZINESS AND GIDDINESS: ICD-10-CM

## 2024-11-06 DIAGNOSIS — I10 ESSENTIAL (PRIMARY) HYPERTENSION: ICD-10-CM

## 2024-11-06 DIAGNOSIS — I44.1 ATRIOVENTRICULAR BLOCK, SECOND DEGREE: ICD-10-CM

## 2024-11-06 LAB
ANION GAP SERPL CALC-SCNC: 12 MMOL/L — SIGNIFICANT CHANGE UP (ref 5–17)
ANION GAP SERPL CALC-SCNC: 13 MMOL/L — SIGNIFICANT CHANGE UP (ref 5–17)
APPEARANCE UR: CLEAR — SIGNIFICANT CHANGE UP
APTT BLD: 23.9 SEC — LOW (ref 24.5–35.6)
BACTERIA # UR AUTO: NEGATIVE /HPF — SIGNIFICANT CHANGE UP
BASOPHILS # BLD AUTO: 0.01 K/UL — SIGNIFICANT CHANGE UP (ref 0–0.2)
BASOPHILS NFR BLD AUTO: 0.1 % — SIGNIFICANT CHANGE UP (ref 0–2)
BILIRUB UR-MCNC: NEGATIVE — SIGNIFICANT CHANGE UP
BUN SERPL-MCNC: 26 MG/DL — HIGH (ref 7–23)
BUN SERPL-MCNC: 28 MG/DL — HIGH (ref 7–23)
CALCIUM SERPL-MCNC: 9 MG/DL — SIGNIFICANT CHANGE UP (ref 8.4–10.5)
CALCIUM SERPL-MCNC: 9 MG/DL — SIGNIFICANT CHANGE UP (ref 8.4–10.5)
CAST: 0 /LPF — SIGNIFICANT CHANGE UP (ref 0–4)
CHLORIDE SERPL-SCNC: 92 MMOL/L — LOW (ref 96–108)
CHLORIDE SERPL-SCNC: 93 MMOL/L — LOW (ref 96–108)
CO2 SERPL-SCNC: 25 MMOL/L — SIGNIFICANT CHANGE UP (ref 22–31)
CO2 SERPL-SCNC: 26 MMOL/L — SIGNIFICANT CHANGE UP (ref 22–31)
COLOR SPEC: YELLOW — SIGNIFICANT CHANGE UP
CREAT SERPL-MCNC: 0.96 MG/DL — SIGNIFICANT CHANGE UP (ref 0.5–1.3)
CREAT SERPL-MCNC: 0.99 MG/DL — SIGNIFICANT CHANGE UP (ref 0.5–1.3)
DIFF PNL FLD: NEGATIVE — SIGNIFICANT CHANGE UP
EGFR: 57 ML/MIN/1.73M2 — LOW
EGFR: 59 ML/MIN/1.73M2 — LOW
EOSINOPHIL # BLD AUTO: 0 K/UL — SIGNIFICANT CHANGE UP (ref 0–0.5)
EOSINOPHIL NFR BLD AUTO: 0 % — SIGNIFICANT CHANGE UP (ref 0–6)
GLUCOSE SERPL-MCNC: 166 MG/DL — HIGH (ref 70–99)
GLUCOSE SERPL-MCNC: 167 MG/DL — HIGH (ref 70–99)
GLUCOSE UR QL: NEGATIVE MG/DL — SIGNIFICANT CHANGE UP
HCT VFR BLD CALC: 30.3 % — LOW (ref 34.5–45)
HCT VFR BLD CALC: 31.4 % — LOW (ref 34.5–45)
HGB BLD-MCNC: 10.6 G/DL — LOW (ref 11.5–15.5)
HGB BLD-MCNC: 10.7 G/DL — LOW (ref 11.5–15.5)
IMM GRANULOCYTES NFR BLD AUTO: 0.3 % — SIGNIFICANT CHANGE UP (ref 0–0.9)
INR BLD: 0.98 RATIO — SIGNIFICANT CHANGE UP (ref 0.85–1.16)
KETONES UR-MCNC: 15 MG/DL
LEUKOCYTE ESTERASE UR-ACNC: ABNORMAL
LYMPHOCYTES # BLD AUTO: 0.56 K/UL — LOW (ref 1–3.3)
LYMPHOCYTES # BLD AUTO: 5.9 % — LOW (ref 13–44)
MCHC RBC-ENTMCNC: 30.2 PG — SIGNIFICANT CHANGE UP (ref 27–34)
MCHC RBC-ENTMCNC: 31.3 PG — SIGNIFICANT CHANGE UP (ref 27–34)
MCHC RBC-ENTMCNC: 34.1 G/DL — SIGNIFICANT CHANGE UP (ref 32–36)
MCHC RBC-ENTMCNC: 35 G/DL — SIGNIFICANT CHANGE UP (ref 32–36)
MCV RBC AUTO: 88.7 FL — SIGNIFICANT CHANGE UP (ref 80–100)
MCV RBC AUTO: 89.4 FL — SIGNIFICANT CHANGE UP (ref 80–100)
MONOCYTES # BLD AUTO: 0.44 K/UL — SIGNIFICANT CHANGE UP (ref 0–0.9)
MONOCYTES NFR BLD AUTO: 4.6 % — SIGNIFICANT CHANGE UP (ref 2–14)
NEUTROPHILS # BLD AUTO: 8.49 K/UL — HIGH (ref 1.8–7.4)
NEUTROPHILS NFR BLD AUTO: 89.1 % — HIGH (ref 43–77)
NITRITE UR-MCNC: NEGATIVE — SIGNIFICANT CHANGE UP
NRBC # BLD: 0 /100 WBCS — SIGNIFICANT CHANGE UP (ref 0–0)
NRBC # BLD: 0 /100 WBCS — SIGNIFICANT CHANGE UP (ref 0–0)
PH UR: 8.5 (ref 5–8)
PLATELET # BLD AUTO: 117 K/UL — LOW (ref 150–400)
PLATELET # BLD AUTO: 128 K/UL — LOW (ref 150–400)
POTASSIUM SERPL-MCNC: 3.7 MMOL/L — SIGNIFICANT CHANGE UP (ref 3.5–5.3)
POTASSIUM SERPL-MCNC: 3.8 MMOL/L — SIGNIFICANT CHANGE UP (ref 3.5–5.3)
POTASSIUM SERPL-SCNC: 3.7 MMOL/L — SIGNIFICANT CHANGE UP (ref 3.5–5.3)
POTASSIUM SERPL-SCNC: 3.8 MMOL/L — SIGNIFICANT CHANGE UP (ref 3.5–5.3)
PROT UR-MCNC: NEGATIVE MG/DL — SIGNIFICANT CHANGE UP
PROTHROM AB SERPL-ACNC: 11.3 SEC — SIGNIFICANT CHANGE UP (ref 9.9–13.4)
RBC # BLD: 3.39 M/UL — LOW (ref 3.8–5.2)
RBC # BLD: 3.54 M/UL — LOW (ref 3.8–5.2)
RBC # FLD: 11.7 % — SIGNIFICANT CHANGE UP (ref 10.3–14.5)
RBC # FLD: 11.9 % — SIGNIFICANT CHANGE UP (ref 10.3–14.5)
RBC CASTS # UR COMP ASSIST: 2 /HPF — SIGNIFICANT CHANGE UP (ref 0–4)
REVIEW: SIGNIFICANT CHANGE UP
SODIUM SERPL-SCNC: 130 MMOL/L — LOW (ref 135–145)
SODIUM SERPL-SCNC: 131 MMOL/L — LOW (ref 135–145)
SP GR SPEC: 1.02 — SIGNIFICANT CHANGE UP (ref 1–1.03)
SQUAMOUS # UR AUTO: 0 /HPF — SIGNIFICANT CHANGE UP (ref 0–5)
TSH SERPL-MCNC: 9.04 UIU/ML — HIGH (ref 0.27–4.2)
UROBILINOGEN FLD QL: 0.2 MG/DL — SIGNIFICANT CHANGE UP (ref 0.2–1)
WBC # BLD: 11.53 K/UL — HIGH (ref 3.8–10.5)
WBC # BLD: 9.53 K/UL — SIGNIFICANT CHANGE UP (ref 3.8–10.5)
WBC # FLD AUTO: 11.53 K/UL — HIGH (ref 3.8–10.5)
WBC # FLD AUTO: 9.53 K/UL — SIGNIFICANT CHANGE UP (ref 3.8–10.5)
WBC UR QL: 2 /HPF — SIGNIFICANT CHANGE UP (ref 0–5)

## 2024-11-06 PROCEDURE — 99223 1ST HOSP IP/OBS HIGH 75: CPT

## 2024-11-06 PROCEDURE — 93306 TTE W/DOPPLER COMPLETE: CPT | Mod: 26

## 2024-11-06 DEVICE — SCREW LOKG 5X32.5MM: Type: IMPLANTABLE DEVICE | Site: LEFT | Status: FUNCTIONAL

## 2024-11-06 DEVICE — SCREW GAMMA LAG 10.5X80MM STRL: Type: IMPLANTABLE DEVICE | Site: LEFT | Status: FUNCTIONAL

## 2024-11-06 DEVICE — PIN ORTHO PRECISION TAPER 3.9X450MM: Type: IMPLANTABLE DEVICE | Site: LEFT | Status: FUNCTIONAL

## 2024-11-06 DEVICE — NAIL INTERMED 125DEG 10X240MM: Type: IMPLANTABLE DEVICE | Site: LEFT | Status: FUNCTIONAL

## 2024-11-06 DEVICE — NAIL INTRAMED TROCHANTER 125 DEG 10X170MM: Type: IMPLANTABLE DEVICE | Site: LEFT | Status: FUNCTIONAL

## 2024-11-06 RX ORDER — MAGNESIUM HYDROXIDE 1200 MG/15ML
30 SUSPENSION ORAL DAILY
Refills: 0 | Status: DISCONTINUED | OUTPATIENT
Start: 2024-11-06 | End: 2024-11-08

## 2024-11-06 RX ORDER — FAMOTIDINE 10 MG/ML
1 INJECTION INTRAVENOUS
Refills: 0 | DISCHARGE

## 2024-11-06 RX ORDER — ESCITALOPRAM 10 MG/1
10 TABLET, FILM COATED ORAL DAILY
Refills: 0 | Status: DISCONTINUED | OUTPATIENT
Start: 2024-11-06 | End: 2024-11-08

## 2024-11-06 RX ORDER — MORPHINE SULFATE 30 MG/1
2 TABLET, EXTENDED RELEASE ORAL ONCE
Refills: 0 | Status: DISCONTINUED | OUTPATIENT
Start: 2024-11-06 | End: 2024-11-06

## 2024-11-06 RX ORDER — OXYCODONE HYDROCHLORIDE 30 MG/1
2.5 TABLET ORAL EVERY 4 HOURS
Refills: 0 | Status: DISCONTINUED | OUTPATIENT
Start: 2024-11-06 | End: 2024-11-08

## 2024-11-06 RX ORDER — ACETAMINOPHEN 500 MG
975 TABLET ORAL EVERY 8 HOURS
Refills: 0 | Status: DISCONTINUED | OUTPATIENT
Start: 2024-11-06 | End: 2024-11-08

## 2024-11-06 RX ORDER — ONDANSETRON HYDROCHLORIDE 2 MG/ML
4 INJECTION, SOLUTION INTRAMUSCULAR; INTRAVENOUS EVERY 6 HOURS
Refills: 0 | Status: DISCONTINUED | OUTPATIENT
Start: 2024-11-06 | End: 2024-11-08

## 2024-11-06 RX ORDER — CEFAZOLIN SODIUM 1 G
2000 VIAL (EA) INJECTION EVERY 8 HOURS
Refills: 0 | Status: COMPLETED | OUTPATIENT
Start: 2024-11-06 | End: 2024-11-06

## 2024-11-06 RX ORDER — ENOXAPARIN SODIUM 40MG/0.4ML
30 SYRINGE (ML) SUBCUTANEOUS EVERY 24 HOURS
Refills: 0 | Status: DISCONTINUED | OUTPATIENT
Start: 2024-11-07 | End: 2024-11-07

## 2024-11-06 RX ORDER — ESCITALOPRAM 10 MG/1
1 TABLET, FILM COATED ORAL
Refills: 0 | DISCHARGE

## 2024-11-06 RX ORDER — ONDANSETRON HYDROCHLORIDE 2 MG/ML
4 INJECTION, SOLUTION INTRAMUSCULAR; INTRAVENOUS EVERY 8 HOURS
Refills: 0 | Status: DISCONTINUED | OUTPATIENT
Start: 2024-11-06 | End: 2024-11-08

## 2024-11-06 RX ORDER — ACETAMINOPHEN 500 MG
1000 TABLET ORAL ONCE
Refills: 0 | Status: DISCONTINUED | OUTPATIENT
Start: 2024-11-06 | End: 2024-11-08

## 2024-11-06 RX ORDER — ONDANSETRON HYDROCHLORIDE 2 MG/ML
4 INJECTION, SOLUTION INTRAMUSCULAR; INTRAVENOUS ONCE
Refills: 0 | Status: DISCONTINUED | OUTPATIENT
Start: 2024-11-06 | End: 2024-11-08

## 2024-11-06 RX ORDER — FAMOTIDINE 10 MG/ML
20 INJECTION INTRAVENOUS
Refills: 0 | Status: DISCONTINUED | OUTPATIENT
Start: 2024-11-06 | End: 2024-11-06

## 2024-11-06 RX ORDER — POLYETHYLENE GLYCOL 3350 17 G/17G
17 POWDER, FOR SOLUTION ORAL AT BEDTIME
Refills: 0 | Status: DISCONTINUED | OUTPATIENT
Start: 2024-11-06 | End: 2024-11-08

## 2024-11-06 RX ORDER — OXYCODONE HYDROCHLORIDE 30 MG/1
5 TABLET ORAL EVERY 4 HOURS
Refills: 0 | Status: DISCONTINUED | OUTPATIENT
Start: 2024-11-06 | End: 2024-11-08

## 2024-11-06 RX ORDER — LOSARTAN POTASSIUM 25 MG/1
1 TABLET ORAL
Refills: 0 | DISCHARGE

## 2024-11-06 RX ORDER — LOSARTAN POTASSIUM 25 MG/1
50 TABLET ORAL DAILY
Refills: 0 | Status: DISCONTINUED | OUTPATIENT
Start: 2024-11-06 | End: 2024-11-06

## 2024-11-06 RX ORDER — SENNA 187 MG
2 TABLET ORAL AT BEDTIME
Refills: 0 | Status: DISCONTINUED | OUTPATIENT
Start: 2024-11-06 | End: 2024-11-08

## 2024-11-06 RX ORDER — LOSARTAN POTASSIUM 25 MG/1
50 TABLET ORAL DAILY
Refills: 0 | Status: DISCONTINUED | OUTPATIENT
Start: 2024-11-06 | End: 2024-11-08

## 2024-11-06 RX ORDER — HYDROMORPHONE HCL/0.9% NACL/PF 6 MG/30 ML
0.25 PATIENT CONTROLLED ANALGESIA SYRINGE INTRAVENOUS
Refills: 0 | Status: DISCONTINUED | OUTPATIENT
Start: 2024-11-06 | End: 2024-11-08

## 2024-11-06 RX ORDER — CHOLECALCIFEROL (VITAMIN D3) 625 MCG
2000 CAPSULE ORAL DAILY
Refills: 0 | Status: DISCONTINUED | OUTPATIENT
Start: 2024-11-06 | End: 2024-11-07

## 2024-11-06 RX ORDER — CHOLECALCIFEROL (VITAMIN D3) 625 MCG
2000 CAPSULE ORAL DAILY
Refills: 0 | Status: DISCONTINUED | OUTPATIENT
Start: 2024-11-06 | End: 2024-11-08

## 2024-11-06 RX ORDER — ESCITALOPRAM 10 MG/1
10 TABLET, FILM COATED ORAL DAILY
Refills: 0 | Status: DISCONTINUED | OUTPATIENT
Start: 2024-11-06 | End: 2024-11-06

## 2024-11-06 RX ORDER — MORPHINE SULFATE 30 MG/1
2 TABLET, EXTENDED RELEASE ORAL EVERY 4 HOURS
Refills: 0 | Status: DISCONTINUED | OUTPATIENT
Start: 2024-11-06 | End: 2024-11-08

## 2024-11-06 RX ORDER — FAMOTIDINE 10 MG/ML
20 INJECTION INTRAVENOUS
Refills: 0 | Status: DISCONTINUED | OUTPATIENT
Start: 2024-11-06 | End: 2024-11-08

## 2024-11-06 RX ORDER — PANTOPRAZOLE SODIUM 40 MG/1
40 TABLET, DELAYED RELEASE ORAL
Refills: 0 | Status: DISCONTINUED | OUTPATIENT
Start: 2024-11-06 | End: 2024-11-08

## 2024-11-06 RX ORDER — SODIUM CHLORIDE 9 MG/ML
1000 INJECTION, SOLUTION INTRAMUSCULAR; INTRAVENOUS; SUBCUTANEOUS
Refills: 0 | Status: DISCONTINUED | OUTPATIENT
Start: 2024-11-06 | End: 2024-11-08

## 2024-11-06 RX ORDER — ACETAMINOPHEN 500 MG
1000 TABLET ORAL ONCE
Refills: 0 | Status: COMPLETED | OUTPATIENT
Start: 2024-11-06 | End: 2024-11-06

## 2024-11-06 RX ORDER — FAMOTIDINE 10 MG/ML
20 INJECTION INTRAVENOUS ONCE
Refills: 0 | Status: COMPLETED | OUTPATIENT
Start: 2024-11-06 | End: 2024-11-06

## 2024-11-06 RX ORDER — TRAMADOL HYDROCHLORIDE 50 MG/1
50 TABLET, COATED ORAL EVERY 6 HOURS
Refills: 0 | Status: DISCONTINUED | OUTPATIENT
Start: 2024-11-06 | End: 2024-11-08

## 2024-11-06 RX ADMIN — Medication 100 MILLIGRAM(S): at 21:28

## 2024-11-06 RX ADMIN — Medication 975 MILLIGRAM(S): at 22:27

## 2024-11-06 RX ADMIN — MORPHINE SULFATE 2 MILLIGRAM(S): 30 TABLET, EXTENDED RELEASE ORAL at 04:06

## 2024-11-06 RX ADMIN — Medication 2000 UNIT(S): at 12:15

## 2024-11-06 RX ADMIN — ONDANSETRON HYDROCHLORIDE 4 MILLIGRAM(S): 2 INJECTION, SOLUTION INTRAMUSCULAR; INTRAVENOUS at 06:54

## 2024-11-06 RX ADMIN — FAMOTIDINE 20 MILLIGRAM(S): 10 INJECTION INTRAVENOUS at 06:15

## 2024-11-06 RX ADMIN — Medication 400 MILLIGRAM(S): at 03:34

## 2024-11-06 RX ADMIN — SODIUM CHLORIDE 100 MILLILITER(S): 9 INJECTION, SOLUTION INTRAMUSCULAR; INTRAVENOUS; SUBCUTANEOUS at 14:56

## 2024-11-06 RX ADMIN — Medication 975 MILLIGRAM(S): at 21:27

## 2024-11-06 RX ADMIN — MORPHINE SULFATE 2 MILLIGRAM(S): 30 TABLET, EXTENDED RELEASE ORAL at 09:05

## 2024-11-06 RX ADMIN — Medication 1000 MILLIGRAM(S): at 04:34

## 2024-11-06 RX ADMIN — ESCITALOPRAM 10 MILLIGRAM(S): 10 TABLET, FILM COATED ORAL at 12:15

## 2024-11-06 RX ADMIN — MORPHINE SULFATE 2 MILLIGRAM(S): 30 TABLET, EXTENDED RELEASE ORAL at 10:05

## 2024-11-06 RX ADMIN — LOSARTAN POTASSIUM 50 MILLIGRAM(S): 25 TABLET ORAL at 06:14

## 2024-11-06 RX ADMIN — MORPHINE SULFATE 2 MILLIGRAM(S): 30 TABLET, EXTENDED RELEASE ORAL at 03:06

## 2024-11-06 RX ADMIN — Medication 2 TABLET(S): at 21:28

## 2024-11-06 NOTE — CHART NOTE - NSCHARTNOTEFT_GEN_A_CORE
Patient was seen and evaluated at bedside. Patient with no acute complaints.   Pain is well controlled.  Denies CP/SOB, dyspnea, paresthesias, N/V/D/HA.     Vital Signs Last 24 Hrs  T(C): 37.1 (06 Nov 2024 16:44), Max: 38.5 (06 Nov 2024 12:35)  T(F): 98.7 (06 Nov 2024 16:44), Max: 101.3 (06 Nov 2024 12:35)  HR: 71 (06 Nov 2024 16:44) (56 - 83)  BP: 123/58 (06 Nov 2024 16:44) (121/58 - 168/68)  BP(mean): 91 (06 Nov 2024 15:45) (78 - 99)  RR: 17 (06 Nov 2024 16:44) (16 - 18)  SpO2: 95% (06 Nov 2024 16:44) (94% - 100%)    Parameters below as of 06 Nov 2024 16:44  Patient On (Oxygen Delivery Method): room air      I&O's Detail    05 Nov 2024 07:01  -  06 Nov 2024 07:00  --------------------------------------------------------  IN:  Total IN: 0 mL    OUT:    Voided (mL): 150 mL  Total OUT: 150 mL    Total NET: -150 mL      06 Nov 2024 07:01  -  06 Nov 2024 17:19  --------------------------------------------------------  IN:    sodium chloride 0.9%: 250 mL  Total IN: 250 mL    OUT:    Oral Fluid: 0 mL  Total OUT: 0 mL    Total NET: 250 mL    Exam:  General: AAOx3, NAD, resting comfortably in bed.  Cards: +S1/S2, RRR  Pulm: CTAB  Laterality:  Left Lower Extremity           Dressings: Aquacel dressing (x2) are C/D/I.            Skin is pink and warm.           Sensory: sensation intact to light touch          Motor:  5/5 (+) TA/gastrocnemius/EHL/FHL          Wound with no drainage, healing well.           Calves soft, nontender.            2+ DP/PT pulses.             Good capillary refill, < 2 sec. Warm, well-perfused.     Labs:                        10.6   9.53  )-----------( 128      ( 06 Nov 2024 06:01 )             30.3     11-06    131[L]  |  92[L]  |  26[H]  ----------------------------<  167[H]  3.7   |  26  |  0.96    Ca    9.0      06 Nov 2024 06:02    TPro  7.3  /  Alb  4.3  /  TBili  0.4  /  DBili  x   /  AST  37  /  ALT  23  /  AlkPhos  84  11-05      Imaging: s/p L Hip Gamma IMN   Hardware in place within surrounding bone cortex, with accompanying screws held in place. No signs of  fractures secondary to hardware placement. Will review official read when available.    A/P: 84yFemale S/p Left Hip Gamma IM Nailing, POD0. VSS. NAD.   -  Daily Physical Therapy:  WBAT LLE   -  DVT prophylaxis: Lovenox 30 mg QD POD1, SCD, early OOB   -  GI prophylaxis: Protonix 40 mg QD   -  Continue with Post-op Antibiotics x 24hrs  -  Pain management  -  IS bedside   -  f/u AM labs   -  Discharge planning: TBD, Home Vs. Rehab pending Physical therapy eval, continue care as per primary team.    Evangelista Pichardo PA-C   Orthopedic Surgery   Pager #[phone]

## 2024-11-06 NOTE — CONSULT NOTE ADULT - PROBLEM SELECTOR RECOMMENDATION 9
Plan for OR   acceptable cardiac risk to proceed   Normal LVEF on Echo last year in my office   RCRI 1

## 2024-11-06 NOTE — PATIENT PROFILE ADULT - ...
Detail Level: Detailed Quality 111:Pneumonia Vaccination Status For Older Adults: Pneumococcal Vaccination Previously Received Quality 110: Preventive Care And Screening: Influenza Immunization: Influenza Immunization Administered during Influenza season 06-Nov-2024 02:39:36

## 2024-11-06 NOTE — H&P ADULT - NSHPADDITIONALINFOADULT_GEN_ALL_CORE
Discussed with Dr. Hans Guerin who requested for initial evaluation/H&P and will assume care of this patient later today

## 2024-11-06 NOTE — PROVIDER CONTACT NOTE (OTHER) - ACTION/TREATMENT ORDERED:
As per Katie LILLY, give IV pepcid as ordered
Per PA cardiology and EP aware , no intervention for now , will continue to monitor

## 2024-11-06 NOTE — H&P ADULT - NSHPSOCIALHISTORY_GEN_ALL_CORE
, lives home with  and son  house wife, no smoking or alcohol  ambulates without assist at home and uses a rolling walker to go out

## 2024-11-06 NOTE — H&P ADULT - HISTORY OF PRESENT ILLNESS
85 yo F with a PMHx significant for HLD, HTN, GERD, sp R hip ORIF from home presents sp fall. Patient states that she was standing earlier in day on day of admission, when she became lightheaded and fell on to the left side of her body. She states she did not have any loss of consciousness. ROS otherwise negative. ON arrival to the ED patient c/o of left hip and left lower extremity pain. Could not bear weight on extremity. Denied chest pain, abd pain, neck or back pain, headache, dizziness.  85 yo F with a PMHx significant for HLD, HTN, GERD, sp R hip ORIF from home presents sp fall. Patient states that she was standing earlier in day on day of admission, when she became lightheaded and fell on to the left side of her body. She states she did not have any loss of consciousness. ROS otherwise negative. ON arrival to the ED patient c/o of left hip and left lower extremity pain. Could not bear weight on extremity. Denied chest pain, abd pain, neck or back pain, headache, dizziness. While on telemetry, patient was noted to have infrequent (3 captured episodes at time of review) episodes of Mobitz Type 2 AV block 85 yo F with a PMHx significant for HLD, HTN, GERD, s/p R hip ORIF from home presents after a fall. Patient states that she was exercising her shoulders, then sat down for 20 minutes to rest.  She stood up and walked to the kitchen when she lost her balance and fell on to the left side of her body. She is not sure if she felt lightheaded prior to the fall but she did not have any loss of consciousness.  Patient ould not bear weight on extremity. Denied chest pain, abd pain, neck or back pain, headache, dizziness. While on telemetry, patient was noted to have infrequent (3 captured episodes at time of review) episodes of Mobitz Type 2 AV block 84 year-old female with a PMHx significant for HLD, HTN, thrombocytopenia, GERD, s/p R hip ORIF (9/2023) from home presents after a fall. Patient states that she was exercising her shoulders, then sat down for 20 minutes to rest.  She stood up and walked to the kitchen when she lost her balance and fell on to the left side of her body. She is not sure if she felt lightheaded prior to the fall but she did not have any loss of consciousness.  Patient c/o left hip and leg pain and could not bear weight on extremity.  She has been having chronic lightheadedness/dizziness with imbalance as well as chronic sinus bradycardia 40's for some times, resulting in a fall last year s/p right hip ORIF.  Diltiazem was switched to Hydralazine.  Patient wore loop recorder for 1 week but asymptomatic and recorder did not capture any event.  Patient was evaluated by neurologist and treated with Triamterene/HCTZ and recently completed MRI brain/neck -reported normal (Dr. Rosenstein).  She was treated with Lexapro for anxiety, which recently has been increased.  Son states patient's blood pressures has been labile and Hydralazine/Losartan have been reduced from twice daily to once daily.      While on telemetry, patient was noted to have infrequent (3 captured episodes at time of review) episodes of Mobitz Type 2 AV block

## 2024-11-06 NOTE — H&P ADULT - PROBLEM SELECTOR PLAN 3
- will hold diuretics and Hydralazine due to recently labile BP  -will continue Losartan with parameters mild hyponatremia 130 likely SIADH state due to pain  - s/p 1LNS with Na 131 to 130  - will monitor Na and pain control

## 2024-11-06 NOTE — CONSULT NOTE ADULT - ASSESSMENT
Patient is an 85 yo F with a PMHx significant for HLD, HTN, GERD, sp R hip ORIF from home presents sp fall. Patient states that she was standing earlier in day on day of admission, when she became lightheaded and fell on to the left side of her body. No LOC. On arrival to the ED vitals noted at T 98.1 | HR 56 | RR 18 | /60 | SpO2 100% ORA. Admission ECG w/ sinus bradycardia, HR of 58 BPM. Initial CBC, CMP, hsTrop WNL. His XR w/ acute comminuted and displaced left intertrochanteric and femoral neck fracture. Ortho consulted, tentative plan for the OR on 11/6 (pending medical/cards "clearance").    While on telemetry patient was noted with an episode of Mobitz Type 2 AV block, EP thereafter consulted. On evaluation the patient was resting comfortably in bed in NAD. She endorsed no symptoms, denied any dizziness/lightheadedness while in the ED. Telemetry reviewed at ~00:00. Notable for infrequent (3 captured episodes at time of review) episodes of Mobitz Type 2 AV block. BPs WNL.    #Heart block, Mobitz type 2  - Presenting rhythm on ECG sinus bradycardia at 58 BPM  - Telemetry notable for infrequent (3 captured episodes at time of review) episodes of Mobitz Type 2 AV block. Patient was asymptomatic during these episodes  - Nevertheless patient remains at risk for degradation into higher grade AV block.   - Maintain on telemetry to quantify frequency of episodes  - Would keep the patient with transcutaneous pads in place should frequency of Mobitz 2 block significantly increase/patient develop symptomatic heart block or patient develops complete heart block requiring TCP  - Please make NPO for possible PPM in the AM, will discuss with team in the morning  - If patient develops sustained episode of heart block, hypotension, AMS please call cardiology/EP STAT  - Cannot clear patient for surgical procedure until EP assessment is complete Patient is an 85 yo F with a PMHx significant for HLD, HTN, GERD, sp R hip ORIF from home presents sp fall. Patient states that she was standing earlier in day on day of admission, when she became lightheaded and fell on to the left side of her body. No LOC. On arrival to the ED vitals noted at T 98.1 | HR 56 | RR 18 | /60 | SpO2 100% ORA. Admission ECG w/ sinus bradycardia, HR of 58 BPM. Initial CBC, CMP, hsTrop WNL. His XR w/ acute comminuted and displaced left intertrochanteric and femoral neck fracture. Ortho consulted, tentative plan for the OR on 11/6 (pending medical/cards "clearance").    While on telemetry patient was noted with an episode of Mobitz Type 2 AV block, EP thereafter consulted. On evaluation the patient was resting comfortably in bed in NAD. She endorsed no symptoms, denied any dizziness/lightheadedness while in the ED. Telemetry reviewed at ~00:00. Notable for infrequent (3 captured episodes at time of review) episodes of Mobitz Type 2 AV block. BPs WNL.    #Heart block, Mobitz type 2  - Presenting rhythm on ECG sinus bradycardia at 58 BPM  - Telemetry notable for infrequent (3 captured episodes at time of review) episodes of Mobitz Type 2 AV block. Patient was asymptomatic during these episodes  - Nevertheless patient remains at risk for degradation into higher grade AV block.   - Maintain on telemetry to quantify frequency of episodes  - Would keep the patient with transcutaneous pads in place as backup should frequency of Mobitz 2 block significantly increase/patient develop symptomatic heart block or patient develops complete heart block requiring TCP  - Please make NPO for possible PPM in the AM, will discuss with team in the morning  - No beta blockers or calcium channel blockers  - If patient develops sustained episode of heart block, hypotension, AMS please call cardiology/EP STAT  - Cannot clear patient for surgical procedure until EP assessment is complete

## 2024-11-06 NOTE — PROVIDER CONTACT NOTE (OTHER) - SITUATION
pt has 2:1 block on tele Hr down to low 40s on tele on and off
Pt c/o CP related to her reflux
Patent

## 2024-11-06 NOTE — PRE PROCEDURE NOTE - PRE PROCEDURE EVALUATION
Patient Age: 84    Patient Gender: F    Procedure: L hip IMN    Diagnosis/Indication: L IT Fx    Attending Physician: Dr. Martinez    Pertinent Medical History:    Pertinent labs:                      10.7   11.53 )-----------( 117      ( 06 Nov 2024 00:39 )             31.4       11-06    130[L]  |  93[L]  |  28[H]  ----------------------------<  166[H]  3.8   |  25  |  0.99    Ca    9.0      06 Nov 2024 00:39    TPro  7.3  /  Alb  4.3  /  TBili  0.4  /  DBili  x   /  AST  37  /  ALT  23  /  AlkPhos  84  11-05      PT/INR - ( 06 Nov 2024 00:39 )   PT: 11.3 sec;   INR: 0.98 ratio         PTT - ( 06 Nov 2024 00:39 )  PTT:23.9 sec        Patient and Family Aware ? Yes

## 2024-11-06 NOTE — H&P ADULT - PROBLEM SELECTOR PLAN 2
- Ortho consult appreciated, will need T&C now with plan for OR later today pending EP recommendation for optimization  - Patient is medically optimized otherwise for OR - Ortho consult appreciated, will need T&C now with plan for OR later today pending EP recommendation for optimization  - Patient is medically optimized otherwise for OR  - pain control with Tylenol and Morphine iv prn

## 2024-11-06 NOTE — PROGRESS NOTE ADULT - ASSESSMENT
Assessment/Plan:  84yFemale with left hip IT fx    - plan for OR for operative fixation tomorrow   - NPO except medications  - IVF while NPO  - NWB LLE , bedrest  - Please hold chemical DVT ppx, SCDs okay  - FU Preop labs  - Medical comanagement appreciated, please document clearance  - Please document EP clearance for OR given heart block  - discussed with attending who agrees with above     Lucinda Parada MD  Orthopaedic Surgery Resident    For all questions, please reach out via the following numbers for the on-call resident; do not reach out via Teams.  [facility] [place] [id number]  [facility]        [id number]  [facility]  [id number]/ [phone] Assessment/Plan:  84yFemale with left hip IT fx    - plan for OR for operative fixation tomorrow   - NPO except medications  - IVF while NPO  - NWB LLE , bedrest  - Please hold chemical DVT ppx, SCDs okay  - FU Preop labs  - Medical comanagement appreciated, please document clearance  - Please document cardiology and EP clearance for OR given heart block  - discussed with attending who agrees with above     Lucinda Parada MD  Orthopaedic Surgery Resident    For all questions, please reach out via the following numbers for the on-call resident; do not reach out via Teams.  [facility] [place] [id number]  [facility]        [id number]  [facility]  [id number]/ [phone]

## 2024-11-06 NOTE — H&P ADULT - ADDITIONAL PE
T(F): 98.1 (06 Nov 2024 02:23), Max: 98.5 (06 Nov 2024 00:36)  HR: 58 (06 Nov 2024 02:23) (56 - 66)  BP: 157/65 (06 Nov 2024 02:23) (130/51 - 166/60)  RR: 18 (06 Nov 2024 02:23) (16 - 18)  SpO2: 99% (06 Nov 2024 02:23) (99% - 100%): room air

## 2024-11-06 NOTE — PRE-ANESTHESIA EVALUATION ADULT - NSANTHPMHFT_GEN_ALL_CORE
chart and consultant notes reviewed. no hx sig cad/chf - states ambulates at baseline, no cp/sob. unremarkable recent tte per primary cv. states pt has hx of orthostasis, which episode yesterday was c/w. hb on tele monitor. no further w/u or intervention prior to OR per ep + cv. high tsh, currently clinically euthyroid

## 2024-11-06 NOTE — H&P ADULT - ASSESSMENT
83 yo F with a PMHx significant for HLD, HTN, GERD, sp R hip ORIF from home presents after a fall with prodrome of lightheadedness and left hip pain admitted with left hip fracture and Mor[place] 2 AV block 83 yo F with a PMHx significant for HLD, HTN, GERD, sp R hip ORIF from home presents after a fall with prodrome of lightheadedness and left hip pain admitted with left hip fracture and Mobitz 2 AV block 85 yo F with a PMHx significant for HLD, HTN, GERD, sp R hip ORIF from home presents after a fall with prodrome of lightheadedness and left hip pain admitted with acute comminuted and displaced left intertrochanteric and femoral neck fracture and Mobitz 2 AV block 84 year-old female with a PMHx significant for HLD, HTN, thrombocytopenia, GERD, s/p R hip ORIF (9/2023) from home presents after a fall admitted with acute comminuted and displaced left intertrochanteric and femoral neck fracture and Mobitz 2 AV block

## 2024-11-06 NOTE — PROGRESS NOTE ADULT - ASSESSMENT
84 year-old female with a PMHx significant for HLD, HTN, thrombocytopenia, GERD, s/p R hip ORIF (9/2023) from home presents after a fall admitted with acute comminuted and displaced left intertrochanteric and femoral neck fracture    left hip fx  ortho following  pending OR today  - NPO except medications  - IVF while NPO  - NWB LLE , bedrest  pt without decompensated heart failure.  no chest pain.  baseline good exercise tolerance.  moderate risk candidate for planned surgery    heart block  EP eval noted  hold AV blocker  tele monitor  - Would keep the patient with transcutaneous pads in place as backup should frequency of Mobitz 2 block significantly increase/patient develop symptomatic heart block or patient develops complete heart block requiring TCP.  atropine at bedside     dizziness  check orthostatics  trial of meclizine     abnl tsh  repeat full thyroid panel    htn  meds as per cards  hold av blocker  check orthostatics    dvt ppx      Advanced care planning was discussed with patient and family.  Advanced care planning forms were reviewed and discussed as appropriate.  Differential diagnosis and plan of care discussed with patient after the evaluation.   Pain assessed and judicious use of narcotics when appropriate was discussed.  Importance of Fall prevention discussed.  Counseling on Smoking and Alcohol cessation was offered when appropriate.  Counseling on Diet, exercise, and medication compliance was done.

## 2024-11-06 NOTE — BRIEF OPERATIVE NOTE - NSICDXBRIEFPROCEDURE_GEN_ALL_CORE_FT
PROCEDURES:  Intramedullary insertion of intertrochanteric antegrade nail into hip 06-Nov-2024 14:22:07  Eduardo Ledesma

## 2024-11-06 NOTE — CONSULT NOTE ADULT - SUBJECTIVE AND OBJECTIVE BOX
CHIEF COMPLAINT:    HISTORY OF PRESENT ILLNESS:  83 yo F with a PMHx significant for HLD, HTN, GERD, sp R hip ORIF from home presents sp fall. Patient states that she was standing earlier in day on day of admission, when she became lightheaded and fell on to the left side of her body. She states she did not have any loss of consciousness. ROS otherwise negative. ON arrival to the ED patient c/o of left hip and left lower extremity pain. Could not bear weight on extremity. Denied chest pain, abd pain, neck or back pain, headache, dizziness.     On arrival to the ED vitals noted at T 98.1 | HR 56 | RR 18 | /60 | SpO2 100% ORA. Admission ECG w/ sinus bradycardia, HR of 58 BPM. Initial CBC, CMP, hsTrop WNL. His XR w/ acute comminuted and displaced left intertrochanteric and   femoral neck fracture. Ortho consulted, tentative plan for the OR on 11/6 (pending medical/cards "clearance").    While on telemetry patient was noted with an episode of Mobitz Type 2 AV block, EP thereafter consulted. On evaluation the patient was resting comfortably in bed in NAD. She endorsed no symptoms, denied any dizziness/lightheadedness while in the ED. Telemetry reviewed at ~00:00. Notable for infrequent (3 captured episodes at time of review) episodes of Mobitz Type 2 AV block. BPs WNL.     - Tele: sinus bradycardia is predominant rhythm; occasional episodes of Mobitz type II heart block      PAST MEDICAL & SURGICAL HISTORY:  HTN (hypertension)      Hypercholesteremia      GERD (gastroesophageal reflux disease)      Arthritis      Thrombocytopenia      S/P ORIF (open reduction internal fixation) fracture              MEDICATIONS:  losartan 50 milliGRAM(s) Oral daily        escitalopram 10 milliGRAM(s) Oral daily  morphine  - Injectable 2 milliGRAM(s) IV Push every 4 hours PRN  ondansetron Injectable 4 milliGRAM(s) IV Push every 8 hours PRN    famotidine    Tablet 20 milliGRAM(s) Oral two times a day PRN      cholecalciferol 2000 Unit(s) Oral daily      FAMILY HISTORY:  FH: heart disease (Mother)    FH: diabetes mellitus (Father)        SOCIAL HISTORY:    [ ] Non-smoker  [ ] Smoker  [ ] Alcohol    Allergies    No Known Allergies    Intolerances    amlodipine (Swelling)  	    REVIEW OF SYSTEMS:  CONSTITUTIONAL: No fever, weight loss, or fatigue  EYES: No eye pain, visual disturbances, or discharge  ENMT:  No difficulty hearing, tinnitus, vertigo; No sinus or throat pain  NECK: No pain or stiffness  RESPIRATORY: No cough, wheezing, chills or hemoptysis; No Shortness of Breath  CARDIOVASCULAR: No chest pain, palpitations, passing out, dizziness, or leg swelling  GASTROINTESTINAL: No abdominal or epigastric pain. No nausea, vomiting, or hematemesis; No diarrhea or constipation. No melena or hematochezia.  GENITOURINARY: No dysuria, frequency, hematuria, or incontinence  NEUROLOGICAL: No headaches, memory loss, loss of strength, numbness, or tremors  SKIN: No itching, burning, rashes, or lesions   LYMPH Nodes: No enlarged glands  ENDOCRINE: No heat or cold intolerance; No hair loss  MUSCULOSKELETAL: No joint pain or swelling; No muscle, back, or extremity pain  PSYCHIATRIC: No depression, anxiety, mood swings, or difficulty sleeping  HEME/LYMPH: No easy bruising, or bleeding gums  ALLERY AND IMMUNOLOGIC: No hives or eczema	    [ ] All others negative	  [ ] Unable to obtain    PHYSICAL EXAM:  T(C): 37.4 (11-06-24 @ 04:00), Max: 37.4 (11-06-24 @ 04:00)  HR: 68 (11-06-24 @ 04:00) (56 - 68)  BP: 121/58 (11-06-24 @ 04:00) (121/58 - 166/60)  RR: 17 (11-06-24 @ 04:00) (16 - 18)  SpO2: 94% (11-06-24 @ 04:00) (94% - 100%)  Wt(kg): --  I&O's Summary    05 Nov 2024 07:01  -  06 Nov 2024 07:00  --------------------------------------------------------  IN: 0 mL / OUT: 150 mL / NET: -150 mL    06 Nov 2024 07:01  -  06 Nov 2024 08:59  --------------------------------------------------------  IN: 0 mL / OUT: 0 mL / NET: 0 mL        Appearance: Normal	  HEENT:   Normal oral mucosa, PERRL, EOMI	  Lymphatic: No lymphadenopathy  Cardiovascular: Normal S1 S2, No JVD, No murmurs, No edema  Respiratory: Lungs clear to auscultation	  Psychiatry: A & O x 3, Mood & affect appropriate  Gastrointestinal:  Soft, Non-tender, + BS	  Skin: No rashes, No ecchymoses, No cyanosis	  Neurologic: Non-focal  Extremities: Normal range of motion, No clubbing, cyanosis or edema  Vascular: Peripheral pulses palpable 2+ bilaterally    TELEMETRY: 	    ECG:  	  RADIOLOGY:  OTHER: 	  	  LABS:	 	    CARDIAC MARKERS:                                  10.6   9.53  )-----------( 128      ( 06 Nov 2024 06:01 )             30.3     11-06    131[L]  |  92[L]  |  26[H]  ----------------------------<  167[H]  3.7   |  26  |  0.96    Ca    9.0      06 Nov 2024 06:02    TPro  7.3  /  Alb  4.3  /  TBili  0.4  /  DBili  x   /  AST  37  /  ALT  23  /  AlkPhos  84  11-05    proBNP:   Lipid Profile:   HgA1c:   TSH: Thyroid Stimulating Hormone, Serum: 9.04 uIU/mL (11-06 @ 06:02)             CHIEF COMPLAINT:    HISTORY OF PRESENT ILLNESS:  85 yo F with a PMHx significant for HLD, HTN, GERD, sp R hip ORIF from home presents sp fall. Patient states that she was standing earlier in day on day of admission, when she became lightheaded and fell on to the left side of her body. She states she did not have any loss of consciousness. ROS otherwise negative. ON arrival to the ED patient c/o of left hip and left lower extremity pain. Could not bear weight on extremity. Denied chest pain, abd pain, neck or back pain, headache, dizziness.     On arrival to the ED vitals noted at T 98.1 | HR 56 | RR 18 | /60 | SpO2 100% ORA. Admission ECG w/ sinus bradycardia, HR of 58 BPM. Initial CBC, CMP, hsTrop WNL. His XR w/ acute comminuted and displaced left intertrochanteric and   femoral neck fracture. Ortho consulted, tentative plan for the OR on 11/6 (pending medical/cards "clearance").    While on telemetry patient was noted with an episode of Mobitz Type 1 AV block, The patient was resting comfortably in bed in NAD. She endorsed no symptoms, denied any dizziness/lightheadedness while in the ED.     PAST MEDICAL & SURGICAL HISTORY:  HTN (hypertension)      Hypercholesteremia      GERD (gastroesophageal reflux disease)      Arthritis      Thrombocytopenia      S/P ORIF (open reduction internal fixation) fracture              MEDICATIONS:  losartan 50 milliGRAM(s) Oral daily        escitalopram 10 milliGRAM(s) Oral daily  morphine  - Injectable 2 milliGRAM(s) IV Push every 4 hours PRN  ondansetron Injectable 4 milliGRAM(s) IV Push every 8 hours PRN    famotidine    Tablet 20 milliGRAM(s) Oral two times a day PRN      cholecalciferol 2000 Unit(s) Oral daily      FAMILY HISTORY:  FH: heart disease (Mother)    FH: diabetes mellitus (Father)        SOCIAL HISTORY:    [ ] Non-smoker  [ ] Smoker  [ ] Alcohol    Allergies    No Known Allergies    Intolerances    amlodipine (Swelling)  	    REVIEW OF SYSTEMS:  CONSTITUTIONAL: No fever, weight loss,+ fatigue  EYES: No eye pain, visual disturbances, or discharge  ENMT:  No difficulty hearing, tinnitus, vertigo; No sinus or throat pain  NECK: No pain or stiffness  RESPIRATORY: No cough, wheezing, chills or hemoptysis; No Shortness of Breath  CARDIOVASCULAR: No chest pain, palpitations, passing out, dizziness, or leg swelling  GASTROINTESTINAL: No abdominal or epigastric pain. No nausea, vomiting, or hematemesis; No diarrhea or constipation. No melena or hematochezia.  GENITOURINARY: No dysuria, frequency, hematuria, or incontinence  NEUROLOGICAL: No headaches, memory loss, loss of strength, numbness, or tremors  SKIN: No itching, burning, rashes, or lesions   LYMPH Nodes: No enlarged glands  ENDOCRINE: No heat or cold intolerance; No hair loss  MUSCULOSKELETAL: No joint pain or swelling; No muscle, back, or extremity pain  PSYCHIATRIC: No depression, anxiety, mood swings, or difficulty sleeping  HEME/LYMPH: No easy bruising, or bleeding gums  ALLERY AND IMMUNOLOGIC: No hives or eczema	    [ ] All others negative	  [ ] Unable to obtain    PHYSICAL EXAM:  T(C): 37.4 (11-06-24 @ 04:00), Max: 37.4 (11-06-24 @ 04:00)  HR: 68 (11-06-24 @ 04:00) (56 - 68)  BP: 121/58 (11-06-24 @ 04:00) (121/58 - 166/60)  RR: 17 (11-06-24 @ 04:00) (16 - 18)  SpO2: 94% (11-06-24 @ 04:00) (94% - 100%)  Wt(kg): --  I&O's Summary    05 Nov 2024 07:01  -  06 Nov 2024 07:00  --------------------------------------------------------  IN: 0 mL / OUT: 150 mL / NET: -150 mL    06 Nov 2024 07:01  -  06 Nov 2024 08:59  --------------------------------------------------------  IN: 0 mL / OUT: 0 mL / NET: 0 mL        Appearance: Normal	  HEENT:   Normal oral mucosa, PERRL, EOMI	  Lymphatic: No lymphadenopathy  Cardiovascular: Normal S1 S2, No JVD, No murmurs, No edema  Respiratory: Lungs clear to auscultation	  Psychiatry: A & O x 3, Mood & affect appropriate  Gastrointestinal:  Soft, Non-tender, + BS	  Skin: No rashes, No ecchymoses, No cyanosis	  Neurologic: Non-focal  Extremities: LLE: Skin intact, no ecchymosis,        SILT DP/SP/ Bita/Saph,        +EHL/FHL/TA/Gastroc,        Knee/ankle painless ROM,        hip ROM limited 2/2 pain,       DP+,        soft compartments, no calf ttp,        +log roll.  Vascular: Peripheral pulses palpable 2+ bilaterally    TELEMETRY: 	SR Mobitz Type 1     ECG:  	  RADIOLOGY:  < from: CT Cervical Spine No Cont (11.05.24 @ 23:44) >    ACC: [id number] EXAM:  CT CERVICAL SPINE   ORDERED BY:  GEORGE THOMPSON     ACC: [id number] EXAM:  CT BRAIN   ORDERED BY:  GEORGE THOMPSON     PROCEDURE DATE:  11/05/2024          INTERPRETATION:  CLINICAL INFORMATION: Fall.    COMPARISON: CT head 2/20/2020. MR brain 12/2/2022.    CONTRAST:  IV Contrast: NONE  Complications: None reported at time of study completion    TECHNIQUE:    CT BRAIN: Serial axial images were obtained from the skull base to the   vertex using multi-slice helical technique. Sagittal and coronal   reformats were obtained.    CT CERVICAL SPINE: Axial images were obtained of the cervical spine using   multislice helical technique. Reformatted coronal and sagittal images   were obtained.    FINDINGS:    CT BRAIN:    VENTRICLES AND SULCI: Age appropriate involutional changes.  INTRA-AXIAL: No mass effect, acute hemorrhage, or midline shift.  There   are periventricular and subcortical white matter hypodensities,   consistent with microvascular type changes.  EXTRA-AXIAL: No mass or fluid collection. Basal cisterns are normal in   appearance.    VISUALIZED SINUSES:  Scattered mucosal thickening of the ethmoid air   cells.  TYMPANOMASTOID CAVITIES:  Clear.  VISUALIZED ORBITS: Bilateral lens replacement.  CALVARIUM: Intact.    CT CERVICAL SPINE:    VERTEBRAE:  Normal in height. No acute fracture. Multilevel degenerative   changes including marginal osteophytes this is most prominent from C4   through C6.  ALIGNMENT: Straightening of the cervical lordosis may bepositional or   related to degenerative changes. There is a minimal anterolisthesis of C2   on C3.  INTERVERTEBRAL DISC SPACES: Intervertebral disc space narrowing is   appreciated throughout, most prominent at C5-6. Prominent anterior and   posterior disc osteophyte complexes are appreciated from C4 through C6.   Multilevel uncovertebral hypertrophy and posterior facet arthropathies   appreciated.    VISUALIZED LUNGS: Clear.  MISCELLANEOUS: Prevertebral soft tissues are normal.    IMPRESSION:    CT HEAD:  No acute intracranial hemorrhage, mass effect, or midline shift.    CT CERVICAL SPINE:  No acute fracture or traumatic subluxation.  Multilevel degenerative changes .    --- End of Report ---          EDIL FARRELL MD; Resident Radiologist  This document has been electronically signed.  TINO HUTCHISON M.D., Attending Radiologist  This document has been electronically signed. Nov 6 2024  1:02AM    < end of copied text >  < from: Xray Femur 1 View, Left (11.05.24 @ 21:29) >    ACC: [id number] EXAM:  XR HIP WITH PELV 2-3V LT   ORDERED BY:  GEORGE THOMPSON     ACC: [id number] EXAM:  XR FEMUR 1 VIEW LT   ORDERED BY:  GEORGE THOMPSON     PROCEDURE DATE:  11/05/2024          INTERPRETATION:  CLINICAL INDICATION: Left hip pain status post fall.    TECHNIQUE: 2 views of the left hip. Frontal view of the pelvis.    COMPARISON: Pelvis x-rays 9/10/2023.    FINDINGS:  There is an acute comminuted and displaced left intertrochanteric and   femoral neck fracture. No dislocation.  Mild bilateral hip joint space narrowing. Advanced left knee joint   osteoarthritis.  Pelvic and obturator rings are intact.  Right intramedullary femoral martín hardware partially imaged.  Degenerative changes of the pubic symphysis and partially imaged lumbar   spondylosis.    IMPRESSION:  There is an acute comminuted and displaced left intertrochanteric and   femoral neck fracture.    --- End of Report ---          YUDY CARTAGENA MD; Resident Radiologist  This document has been electronically signed.  ZEESHAN GEORGE MD; Attending Radiologist  This document has been electronically signed. Nov 6 2024  7:33AM    < end of copied text >    OTHER: 	  	  LABS:	 	    CARDIAC MARKERS:                                  10.6   9.53  )-----------( 128      ( 06 Nov 2024 06:01 )             30.3     11-06    131[L]  |  92[L]  |  26[H]  ----------------------------<  167[H]  3.7   |  26  |  0.96    Ca    9.0      06 Nov 2024 06:02    TPro  7.3  /  Alb  4.3  /  TBili  0.4  /  DBili  x   /  AST  37  /  ALT  23  /  AlkPhos  84  11-05    proBNP:   Lipid Profile:   HgA1c:   TSH: Thyroid Stimulating Hormone, Serum: 9.04 uIU/mL (11-06 @ 06:02)

## 2024-11-06 NOTE — PROGRESS NOTE ADULT - ASSESSMENT
85 yo F with a PMHx significant for HLD, HTN, GERD, R hip fx 2023 s/p ORIF who presented with a fall at home, found to have a L hip fx. EP consulted for bradycardia and episodes of 2nd deg AV block. On tele review, appears c/w Mobitz 1 AV block while sleeping, which does not require EP intervention.     Recommendations:  - No further EP workup recommended prior to OR  - Monitor on tele, can give Atropine or Glycopyrrolate PRN in OR if patient has episodes of Mobitz 1 while under anesthesia for increased vagal tone  - TSH elevated, add on FT4, T3, endo consult if hypothyroidism confirmed  - Hold AVN blockers    Case discussed with EP attending Dr Varner   83 yo F with a PMHx significant for HLD, HTN, GERD, R hip fx 2023 s/p ORIF who presented with a fall at home, found to have a L hip fx. EP consulted for bradycardia and episodes of 2nd deg AV block. On tele review, appears c/w Mobitz 1 AV block while sleeping, which does not require EP intervention.     Recommendations:  - No further EP workup recommended prior to OR  - Monitor on tele, can give Atropine or Glycopyrrolate PRN in OR if patient has episodes of Mobitz 1 while under anesthesia for increased vagal tone  - TSH elevated, add on FT4, T3, endo consult if hypothyroidism confirmed  - Hold AVN blockers  - Recommend ILR implant prior to discharge    Case discussed with EP attending Dr Varner

## 2024-11-06 NOTE — H&P ADULT - PROBLEM SELECTOR PLAN 1
long standing dizziness/lightheaded with imbalance, some component of orthostasis and chronic sinus bradycardia with a fall resulting in right hip fracture last year, now with recurrent fall and infrequent 3 captured episodes of Mobitz II AV block on tele monitor, hemodynamically stable and asymptomatic  - EP consult appreciated long standing dizziness/lightheaded with imbalance, some component of orthostasis and chronic sinus bradycardia with a fall resulting in right hip fracture last year, now with recurrent fall and infrequent 3 captured episodes of Mobitz II AV block on tele monitor, hemodynamically stable and asymptomatic  - EP consult appreciated.   Patient remains at risk for degradation into higher grade AV block.   - monitor on telemetry to quantify frequency of episodes  - transcutaneous pads in place as backup should frequency of Mobitz 2 block significantly increase or become symptomatic heart block or patient develops complete heart block requiring TCP  - will avoid AVN blocker.  NPO for possible PPM in the AM pending final EP recommendation

## 2024-11-06 NOTE — PROVIDER CONTACT NOTE (OTHER) - ASSESSMENT
vitals stable , denie any chest pain, SOB , dizziness at this time , Due for Sx for L femur fracture,
Pt c/o CP related to her reflux. Pt A/Ox4. Pt states she normally has reflux. Pt NPO for surgery operative fixation of femur

## 2024-11-06 NOTE — ED ADULT NURSE REASSESSMENT NOTE - NS ED NURSE REASSESS COMMENT FT1
called assigned md to request pain meds  - Danny Stark md to put pain meds in for pt after she reviews chart.

## 2024-11-06 NOTE — CONSULT NOTE ADULT - PROBLEM SELECTOR RECOMMENDATION 2
with fall   most consistent with orthostasis, which she has a history of   May be exacerbated by hypovolemia and hyponatremia   Check T4  NS 75 cc/hr for a liter

## 2024-11-06 NOTE — H&P ADULT - PROBLEM SELECTOR PLAN 4
- unable to tolerate statins due to "muscle aches" - will hold diuretics and Hydralazine due to recently labile BP  -will continue Losartan with parameters

## 2024-11-06 NOTE — PATIENT PROFILE ADULT - FALL HARM RISK - HARM RISK INTERVENTIONS
Assistance with ambulation/Assistance OOB with selected safe patient handling equipment/Communicate Risk of Fall with Harm to all staff/Discuss with provider need for PT consult/Monitor gait and stability/Reinforce activity limits and safety measures with patient and family/Sit up slowly, dangle for a short time, stand at bedside before walking/Tailored Fall Risk Interventions/Visual Cue: Yellow wristband and red socks/Bed in lowest position, wheels locked, appropriate side rails in place/Call bell, personal items and telephone in reach/Instruct patient to call for assistance before getting out of bed or chair/Non-slip footwear when patient is out of bed/[place] to call system/Physically safe environment - no spills, clutter or unnecessary equipment/Purposeful Proactive Rounding/Room/bathroom lighting operational, light cord in reach

## 2024-11-06 NOTE — CONSULT NOTE ADULT - SUBJECTIVE AND OBJECTIVE BOX
EP Consult Note  ------------------------------------------------------------------------------------------  HPI: Patient is an 83 yo F with a PMHx significant for HLD, HTN, GERD, sp R hip ORIF from home presents sp fall. Patient states that she was standing earlier in day on day of admission, when she became lightheaded and fell on to the left side of her body. She states she did not have any loss of consciousness. ROS otherwise negative. ON arrival to the ED patient c/o of left hip and left lower extremity pain. Could not bear weight on extremity. Denied chest pain, abd pain, neck or back pain, headache, dizziness.     On arrival to the ED vitals noted at T 98.1 | HR 56 | RR 18 | /60 | SpO2 100% ORA. Admission ECG w/ sinus bradycardia, HR of 58 BPM. Initial CBC, CMP, hsTrop WNL. His XR w/ acute comminuted and displaced left intertrochanteric and   femoral neck fracture. Ortho consulted, tentative plan for the OR on 11/6 (pending medical/cards "clearance").    While on telemetry patient was noted with an episode of Mobitz Type 2 AV block, EP thereafter consulted. On evaluation the patient was resting comfortably in bed in NAD. She endorsed no symptoms, denied any dizziness/lightheadedness while in the ED. Telemetry reviewed at ~00:00. Notable for infrequent (3 captured episodes at time of review) episodes of Mobitz Type 2 AV block. BPs WNL.     - Tele: sinus bradycardia is predominant rhythm; occasional episodes of Mobitz type II heart block  - No events overnight. Denies CP, SOB or Palpitations.     -------------------------------------------------------------------------------------------  VS:  T(F): 98.1 (11-05), Max: 98.1 (11-05)  HR: 56 (11-05) (56 - 56)  BP: 166/60 (11-05) (166/60 - 166/60)  RR: 18 (11-05)  SpO2: 100% (11-05)  I&O's Summary    PHYSICAL EXAM:  GENERAL: NAD  HEAD: Atraumatic, Normocephalic.  ENT: Moist mucous membranes.  NECK: Supple, No JVD.  CHEST/LUNG: Clear to auscultation bilaterally; No rales, rhonchi, wheezing, or rubs. Unlabored respirations.  HEART: Regular rate and rhythm; No murmurs, rubs, or gallops.  ABDOMEN: Bowel sounds present; Soft, Nontender, Nondistended.   EXTREMITIES: No edema. 2+ Peripheral Pulses, brisk capillary refill. No clubbing or cyanosis.     -------------------------------------------------------------------------------------------  LABS:                          11.7   11.49 )-----------( 134      ( 05 Nov 2024 21:00 )             34.4     11-05    131[L]  |  90[L]  |  32[H]  ----------------------------<  130[H]  4.5   |  25  |  1.12    Ca    10.0      05 Nov 2024 21:00    TPro  7.3  /  Alb  4.3  /  TBili  0.4  /  DBili  x   /  AST  37  /  ALT  23  /  AlkPhos  84  11-05    PT/INR - ( 05 Nov 2024 21:00 )   PT: 10.5 sec;   INR: 0.91 ratio         PTT - ( 05 Nov 2024 21:00 )  PTT:25.0 sec  CARDIAC MARKERS ( 05 Nov 2024 21:00 )  12 ng/L / x     / x     / x     / x     / x                -------------------------------------------------------------------------------------------  Meds:    -------------------------------------------------------------------------------------------

## 2024-11-07 LAB
ANION GAP SERPL CALC-SCNC: 10 MMOL/L — SIGNIFICANT CHANGE UP (ref 5–17)
BUN SERPL-MCNC: 28 MG/DL — HIGH (ref 7–23)
CALCIUM SERPL-MCNC: 7.5 MG/DL — LOW (ref 8.4–10.5)
CHLORIDE SERPL-SCNC: 95 MMOL/L — LOW (ref 96–108)
CO2 SERPL-SCNC: 26 MMOL/L — SIGNIFICANT CHANGE UP (ref 22–31)
CREAT SERPL-MCNC: 1.29 MG/DL — SIGNIFICANT CHANGE UP (ref 0.5–1.3)
CULTURE RESULTS: SIGNIFICANT CHANGE UP
EGFR: 41 ML/MIN/1.73M2 — LOW
GLUCOSE SERPL-MCNC: 140 MG/DL — HIGH (ref 70–99)
HCT VFR BLD CALC: 23.3 % — LOW (ref 34.5–45)
HCT VFR BLD CALC: 27.7 % — LOW (ref 34.5–45)
HGB BLD-MCNC: 7.9 G/DL — LOW (ref 11.5–15.5)
HGB BLD-MCNC: 9.3 G/DL — LOW (ref 11.5–15.5)
MAGNESIUM SERPL-MCNC: 2.1 MG/DL — SIGNIFICANT CHANGE UP (ref 1.6–2.6)
MCHC RBC-ENTMCNC: 29.6 PG — SIGNIFICANT CHANGE UP (ref 27–34)
MCHC RBC-ENTMCNC: 30.2 PG — SIGNIFICANT CHANGE UP (ref 27–34)
MCHC RBC-ENTMCNC: 33.6 G/DL — SIGNIFICANT CHANGE UP (ref 32–36)
MCHC RBC-ENTMCNC: 33.9 G/DL — SIGNIFICANT CHANGE UP (ref 32–36)
MCV RBC AUTO: 88.2 FL — SIGNIFICANT CHANGE UP (ref 80–100)
MCV RBC AUTO: 88.9 FL — SIGNIFICANT CHANGE UP (ref 80–100)
NRBC # BLD: 0 /100 WBCS — SIGNIFICANT CHANGE UP (ref 0–0)
NRBC # BLD: 0 /100 WBCS — SIGNIFICANT CHANGE UP (ref 0–0)
PLATELET # BLD AUTO: 82 K/UL — LOW (ref 150–400)
PLATELET # BLD AUTO: 99 K/UL — LOW (ref 150–400)
POTASSIUM SERPL-MCNC: 3.9 MMOL/L — SIGNIFICANT CHANGE UP (ref 3.5–5.3)
POTASSIUM SERPL-SCNC: 3.9 MMOL/L — SIGNIFICANT CHANGE UP (ref 3.5–5.3)
RBC # BLD: 2.62 M/UL — LOW (ref 3.8–5.2)
RBC # BLD: 3.14 M/UL — LOW (ref 3.8–5.2)
RBC # FLD: 12.2 % — SIGNIFICANT CHANGE UP (ref 10.3–14.5)
RBC # FLD: 12.7 % — SIGNIFICANT CHANGE UP (ref 10.3–14.5)
SODIUM SERPL-SCNC: 131 MMOL/L — LOW (ref 135–145)
SPECIMEN SOURCE: SIGNIFICANT CHANGE UP
T3 SERPL-MCNC: 43 NG/DL — LOW (ref 80–200)
T4 AB SER-ACNC: 8.6 UG/DL — SIGNIFICANT CHANGE UP (ref 4.6–12)
WBC # BLD: 7.29 K/UL — SIGNIFICANT CHANGE UP (ref 3.8–10.5)
WBC # BLD: 8.71 K/UL — SIGNIFICANT CHANGE UP (ref 3.8–10.5)
WBC # FLD AUTO: 7.29 K/UL — SIGNIFICANT CHANGE UP (ref 3.8–10.5)
WBC # FLD AUTO: 8.71 K/UL — SIGNIFICANT CHANGE UP (ref 3.8–10.5)

## 2024-11-07 PROCEDURE — 99232 SBSQ HOSP IP/OBS MODERATE 35: CPT

## 2024-11-07 RX ORDER — POLYETHYLENE GLYCOL 3350 17 G/17G
17 POWDER, FOR SOLUTION ORAL ONCE
Refills: 0 | Status: COMPLETED | OUTPATIENT
Start: 2024-11-07 | End: 2024-11-07

## 2024-11-07 RX ORDER — MELATONIN 5 MG
3 TABLET ORAL AT BEDTIME
Refills: 0 | Status: DISCONTINUED | OUTPATIENT
Start: 2024-11-07 | End: 2024-11-08

## 2024-11-07 RX ADMIN — Medication 975 MILLIGRAM(S): at 15:56

## 2024-11-07 RX ADMIN — Medication 2000 UNIT(S): at 11:39

## 2024-11-07 RX ADMIN — Medication 975 MILLIGRAM(S): at 06:18

## 2024-11-07 RX ADMIN — PANTOPRAZOLE SODIUM 40 MILLIGRAM(S): 40 TABLET, DELAYED RELEASE ORAL at 05:19

## 2024-11-07 RX ADMIN — Medication 3 MILLIGRAM(S): at 23:24

## 2024-11-07 RX ADMIN — Medication 2 TABLET(S): at 21:38

## 2024-11-07 RX ADMIN — Medication 975 MILLIGRAM(S): at 05:18

## 2024-11-07 RX ADMIN — ESCITALOPRAM 10 MILLIGRAM(S): 10 TABLET, FILM COATED ORAL at 11:40

## 2024-11-07 RX ADMIN — Medication 30 MILLIGRAM(S): at 05:19

## 2024-11-07 RX ADMIN — Medication 1 LOZENGE: at 23:23

## 2024-11-07 RX ADMIN — POLYETHYLENE GLYCOL 3350 17 GRAM(S): 17 POWDER, FOR SOLUTION ORAL at 11:39

## 2024-11-07 RX ADMIN — LOSARTAN POTASSIUM 50 MILLIGRAM(S): 25 TABLET ORAL at 05:18

## 2024-11-07 NOTE — PROGRESS NOTE ADULT - ASSESSMENT
83 yo F with a PMHx significant for HLD, HTN, GERD, R hip fx 2023 s/p ORIF who presented with a fall at home, found to have a L hip fx. EP consulted for bradycardia and episodes of 2nd deg AV block. On tele review, appears c/w Mobitz 1 AV block while sleeping, which does not require EP intervention.     Recommendations:  - S/p L hip IMN 11/6 without complication  - Monitor on tele  - TSH elevated, add on FT4, T3, endo consult if hypothyroidism confirmed  - Hold AVN blockers  - Recommend ILR implant prior to discharge

## 2024-11-07 NOTE — CHART NOTE - NSCHARTNOTEFT_GEN_A_CORE
Medicine PA Note    H&H noted to drop from 10.6/30.3 to 7.9/23.3 today. Likely post op anemia. Discussed with Dr. Pennington who recommended for 1 U PRBC transfusion. Consented for transfusion with pt, and risks/benefits discussed. All questions answered. Pt asked that her son be called to update him on need for blood transfusion. Son Song called at [phone] and he is also in agreement to proceed.  Will f/u post-transfusion CBC    Mari Melgar PA-C  [id number] Medicine PA Note    H&H noted to drop from 10.6/30.3 to 7.9/23.3 today. Likely post op anemia. Discussed with Dr. Pennington who recommended for 1 U PRBC transfusion. Consented for transfusion with pt, and risks/benefits discussed.  phone used #[id number]. All questions answered. Pt asked that her son be called to update him on need for blood transfusion. Son Song called at [phone] and he is also in agreement to proceed.  Post-transfusion CBC stable.    Mari Melgar PA-C  [id number]

## 2024-11-07 NOTE — PROGRESS NOTE ADULT - ASSESSMENT
84 year-old female with a PMHx significant for HLD, HTN, thrombocytopenia, GERD, s/p R hip ORIF (9/2023) from home presents after a fall admitted with acute comminuted and displaced left intertrochanteric and femoral neck fracture    left hip fx  ortho following  s/p IMN  tolerated surg well  post op care per ortho  PT  WBAT    acute anemia  symptomatic  likely 2/2 blood loss during surg and fall  transfuse 1 unit prbc     heart block  EP eval noted  hold AV blocker  tele monitor  - Would keep the patient with transcutaneous pads in place as backup should frequency of Mobitz 2 block significantly increase/patient develop symptomatic heart block or patient develops complete heart block requiring TCP.  atropine at bedside   plan for ILR as per EP     dizziness  check orthostatics  trial of meclizine     abnl tsh  repeat full thyroid panel    htn  meds as per cards  hold av blocker  check orthostatics    dvt ppx      Advanced care planning was discussed with patient and family.  Advanced care planning forms were reviewed and discussed as appropriate.  Differential diagnosis and plan of care discussed with patient after the evaluation.   Pain assessed and judicious use of narcotics when appropriate was discussed.  Importance of Fall prevention discussed.  Counseling on Smoking and Alcohol cessation was offered when appropriate.  Counseling on Diet, exercise, and medication compliance was done.

## 2024-11-07 NOTE — OCCUPATIONAL THERAPY INITIAL EVALUATION ADULT - NSOTDISCHREC_GEN_A_CORE
Urology Consult Note      Reason for Consultation: priapism    Chief Complaint: \"painful erection for a day\"  HPI:  Maulik Arnold is a 22 y.o. male well-known to our service who has sickle cell anemia and recurrent priapism. Patient is noncompliant with follow-up and medication recommendations. He is admitted to the hospital with sickle cell pain crisis. We have been consulted for a priapism. Original consult was placed on April 19, I arrived at 7:15 AM  on 4/19/23 to evaluate the patient but patient had told the hospitalist that his erection had resolved and the consult was canceled. We were then reconsulted April 20 for priapism. When I examined the patient he tells me that he has had an erection for the past 24 hours, he does report that it has resolved slightly over the past 24 hours but he never had complete detumescence. He reports pain with the erection. He is requesting an injection of phenylephrine.     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)     Sickle cell anemia (HCC)        Past Surgical History:   Procedure Laterality Date    ABSCESS DRAINAGE Left 2012    leg    SPLENECTOMY         Medication List reviewed:     Current Facility-Administered Medications   Medication Dose Route Frequency Provider Last Rate Last Admin    oxyCODONE (ROXICODONE) immediate release tablet 10 mg  10 mg Oral Q3H PRN Safia Mojica MD   10 mg at 04/20/23 1259    HYDROmorphone (DILAUDID) injection 1 mg  1 mg IntraVENous Q3H PRN Isidra Luke MD   1 mg at 04/20/23 1113    hydroxyurea (HYDREA) chemo capsule 1,000 mg  1,000 mg Oral BID Safia Mojica MD   1,000 mg at 04/20/23 0809    sodium chloride flush 0.9 % injection 5-40 mL  5-40 mL IntraVENous 2 times per day Safia Mojica MD   10 mL at 04/20/23 0809    sodium chloride flush 0.9 % injection 5-40 mL  5-40 if dc home pt requires assist for all functional mobility & home OT for safety and to increase independence in ADLs and functional tasks./[facility]-acute Rehab

## 2024-11-07 NOTE — PHYSICAL THERAPY INITIAL EVALUATION ADULT - ACTIVE RANGE OF MOTION EXAMINATION, REHAB EVAL
LLE n/t d/t pain/bilateral upper extremity Active ROM was WFL (within functional limits)/Right LE Active ROM was WFL (within functional limits)/deficits as listed below

## 2024-11-07 NOTE — PHYSICAL THERAPY INITIAL EVALUATION ADULT - ADDITIONAL COMMENTS
as per pt, resides in a PH with son, +1 stair to enter, one flight indoor, PTA, pt amb (I) with rolling walker, (has been using it since Rt hip sx 2023), able to perform ADLs, son helps as needed.

## 2024-11-07 NOTE — PHYSICAL THERAPY INITIAL EVALUATION ADULT - PERTINENT HX OF CURRENT PROBLEM, REHAB EVAL
84 year-old female with a PMHx significant for HLD, HTN, thrombocytopenia, GERD, s/p R hip ORIF (9/2023) from home presents after a fall. Patient states that she was exercising her shoulders, then sat down for 20 minutes to rest.  She stood up and walked to the kitchen when she lost her balance and fell on to the left side of her body. She is not sure if she felt lightheaded prior to the fall but she did not have any loss of consciousness.  Patient c/o left hip and leg pain and could not bear weight on extremity.  She has been having chronic lightheadedness/dizziness with imbalance as well as chronic sinus bradycardia 40's for some times, resulting in a fall last year s/p right hip ORIF.  Diltiazem was switched to Hydralazine.  Patient wore loop recorder for 1 week but asymptomatic and recorder did not capture any event.  Patient was evaluated by neurologist and treated with Triamterene/HCTZ and recently completed MRI brain/neck -reported normal (Dr. Rosenstein).  She was treated with Lexapro for anxiety, which recently has been increased.  Son states patient's blood pressures has been labile and Hydralazine/Losartan have been reduced from twice daily to once daily.      While on telemetry, patient was noted to have infrequent (3 captured episodes at time of review) episodes of Mobitz Type 2 AV block    CXR (-). ECG 11/5, karen, septal infarct, XR Lt femur / hip 11/5, acute comminuted and displaced Lt IT fracture and femoral neck fracture.

## 2024-11-08 ENCOUNTER — TRANSCRIPTION ENCOUNTER (OUTPATIENT)
Age: 84
End: 2024-11-08

## 2024-11-08 VITALS
OXYGEN SATURATION: 95 % | TEMPERATURE: 99 F | SYSTOLIC BLOOD PRESSURE: 128 MMHG | RESPIRATION RATE: 18 BRPM | DIASTOLIC BLOOD PRESSURE: 60 MMHG | HEART RATE: 66 BPM

## 2024-11-08 LAB
ANION GAP SERPL CALC-SCNC: 8 MMOL/L — SIGNIFICANT CHANGE UP (ref 5–17)
BUN SERPL-MCNC: 21 MG/DL — SIGNIFICANT CHANGE UP (ref 7–23)
CALCIUM SERPL-MCNC: 7.8 MG/DL — LOW (ref 8.4–10.5)
CHLORIDE SERPL-SCNC: 101 MMOL/L — SIGNIFICANT CHANGE UP (ref 96–108)
CO2 SERPL-SCNC: 27 MMOL/L — SIGNIFICANT CHANGE UP (ref 22–31)
CREAT SERPL-MCNC: 0.94 MG/DL — SIGNIFICANT CHANGE UP (ref 0.5–1.3)
EGFR: 60 ML/MIN/1.73M2 — SIGNIFICANT CHANGE UP
GLUCOSE SERPL-MCNC: 99 MG/DL — SIGNIFICANT CHANGE UP (ref 70–99)
HCT VFR BLD CALC: 28.7 % — LOW (ref 34.5–45)
HGB BLD-MCNC: 9.6 G/DL — LOW (ref 11.5–15.5)
MCHC RBC-ENTMCNC: 29.4 PG — SIGNIFICANT CHANGE UP (ref 27–34)
MCHC RBC-ENTMCNC: 33.4 G/DL — SIGNIFICANT CHANGE UP (ref 32–36)
MCV RBC AUTO: 87.8 FL — SIGNIFICANT CHANGE UP (ref 80–100)
NRBC # BLD: 0 /100 WBCS — SIGNIFICANT CHANGE UP (ref 0–0)
PLATELET # BLD AUTO: 88 K/UL — LOW (ref 150–400)
POTASSIUM SERPL-MCNC: 3.8 MMOL/L — SIGNIFICANT CHANGE UP (ref 3.5–5.3)
POTASSIUM SERPL-SCNC: 3.8 MMOL/L — SIGNIFICANT CHANGE UP (ref 3.5–5.3)
RBC # BLD: 3.27 M/UL — LOW (ref 3.8–5.2)
RBC # FLD: 12.6 % — SIGNIFICANT CHANGE UP (ref 10.3–14.5)
SODIUM SERPL-SCNC: 136 MMOL/L — SIGNIFICANT CHANGE UP (ref 135–145)
TSH SERPL-MCNC: 5.28 UIU/ML — HIGH (ref 0.27–4.2)
WBC # BLD: 6.3 K/UL — SIGNIFICANT CHANGE UP (ref 3.8–10.5)
WBC # FLD AUTO: 6.3 K/UL — SIGNIFICANT CHANGE UP (ref 3.8–10.5)

## 2024-11-08 PROCEDURE — 99232 SBSQ HOSP IP/OBS MODERATE 35: CPT

## 2024-11-08 PROCEDURE — 33285 INSJ SUBQ CAR RHYTHM MNTR: CPT

## 2024-11-08 RX ORDER — SENNA 187 MG
2 TABLET ORAL
Qty: 0 | Refills: 0 | DISCHARGE
Start: 2024-11-08

## 2024-11-08 RX ORDER — TRAMADOL HYDROCHLORIDE 50 MG/1
1 TABLET, COATED ORAL
Qty: 0 | Refills: 0 | DISCHARGE
Start: 2024-11-08

## 2024-11-08 RX ORDER — ACETAMINOPHEN 500 MG
3 TABLET ORAL
Qty: 0 | Refills: 0 | DISCHARGE
Start: 2024-11-08

## 2024-11-08 RX ORDER — MECLIZINE HCL 25 MG
12.5 TABLET ORAL
Refills: 0 | Status: DISCONTINUED | OUTPATIENT
Start: 2024-11-08 | End: 2024-11-08

## 2024-11-08 RX ORDER — MECLIZINE HCL 25 MG
1 TABLET ORAL
Qty: 0 | Refills: 0 | DISCHARGE
Start: 2024-11-08

## 2024-11-08 RX ORDER — HYDRALAZINE HYDROCHLORIDE 50 MG/1
1 TABLET, FILM COATED ORAL
Refills: 0 | DISCHARGE

## 2024-11-08 RX ORDER — PANTOPRAZOLE SODIUM 40 MG/1
1 TABLET, DELAYED RELEASE ORAL
Qty: 0 | Refills: 0 | DISCHARGE
Start: 2024-11-08

## 2024-11-08 RX ORDER — POLYETHYLENE GLYCOL 3350 17 G/17G
17 POWDER, FOR SOLUTION ORAL DAILY
Refills: 0 | Status: DISCONTINUED | OUTPATIENT
Start: 2024-11-08 | End: 2024-11-08

## 2024-11-08 RX ORDER — OXYCODONE HYDROCHLORIDE 30 MG/1
1 TABLET ORAL
Qty: 0 | Refills: 0 | DISCHARGE
Start: 2024-11-08

## 2024-11-08 RX ORDER — TRIAMTERENE/HYDROCHLOROTHIAZID 37.5-25 MG
1 CAPSULE ORAL
Refills: 0 | DISCHARGE

## 2024-11-08 RX ORDER — POLYETHYLENE GLYCOL 3350 17 G/17G
17 POWDER, FOR SOLUTION ORAL
Qty: 0 | Refills: 0 | DISCHARGE
Start: 2024-11-08

## 2024-11-08 RX ADMIN — PANTOPRAZOLE SODIUM 40 MILLIGRAM(S): 40 TABLET, DELAYED RELEASE ORAL at 06:07

## 2024-11-08 RX ADMIN — LOSARTAN POTASSIUM 50 MILLIGRAM(S): 25 TABLET ORAL at 06:07

## 2024-11-08 RX ADMIN — POLYETHYLENE GLYCOL 3350 17 GRAM(S): 17 POWDER, FOR SOLUTION ORAL at 09:24

## 2024-11-08 RX ADMIN — ESCITALOPRAM 10 MILLIGRAM(S): 10 TABLET, FILM COATED ORAL at 12:21

## 2024-11-08 RX ADMIN — Medication 1 LOZENGE: at 09:24

## 2024-11-08 RX ADMIN — Medication 2000 UNIT(S): at 12:21

## 2024-11-08 RX ADMIN — Medication 975 MILLIGRAM(S): at 13:46

## 2024-11-08 RX ADMIN — Medication 975 MILLIGRAM(S): at 06:25

## 2024-11-08 RX ADMIN — Medication 975 MILLIGRAM(S): at 06:07

## 2024-11-08 RX ADMIN — Medication 975 MILLIGRAM(S): at 14:46

## 2024-11-08 NOTE — DISCHARGE NOTE PROVIDER - NSDCMRMEDTOKEN_GEN_ALL_CORE_FT
acetaminophen 325 mg oral tablet: 3 tab(s) orally every 8 hours as needed for  mild pain  cholecalciferol oral tablet: 2000 unit(s) orally once a day  famotidine 20 mg oral tablet: 1 tab(s) orally 2 times a day as needed for  heartburn  Lexapro 10 mg oral tablet: 1 tab(s) orally once a day  losartan 50 mg oral tablet: 1 tab(s) orally once a day  meclizine 12.5 mg oral tablet: 1 tab(s) orally 2 times a day As needed Dizziness  menthol-benzocaine: 1 lozenge orally every 8 hours as needed for throat pain  oxyCODONE 5 mg oral tablet: 1 tab(s) orally every 4 hours As needed Severe Pain (7 - 10)  pantoprazole 40 mg oral delayed release tablet: 1 tab(s) orally once a day (before a meal)  polyethylene glycol 3350 oral powder for reconstitution: 17 gram(s) orally once a day As needed Constipation  senna leaf extract oral tablet: 2 tab(s) orally once a day (at bedtime)  traMADol 50 mg oral tablet: 1 tab(s) orally every 6 hours as needed for  moderate pain

## 2024-11-08 NOTE — DISCHARGE NOTE NURSING/CASE MANAGEMENT/SOCIAL WORK - PATIENT PORTAL LINK FT
You can access the FollowMyHealth Patient Portal offered by [facility] by registering at the following website: [email or web address]. By joining [employer]’s FollowMyHealth portal, you will also be able to view your health information using other applications (apps) compatible with our system.

## 2024-11-08 NOTE — PROGRESS NOTE ADULT - PROVIDER SPECIALTY LIST ADULT
Cardiology
Electrophysiology
Orthopedics
Electrophysiology
Internal Medicine
Orthopedics
Orthopedics
Electrophysiology
Internal Medicine
Internal Medicine
Cardiology

## 2024-11-08 NOTE — PROGRESS NOTE ADULT - ASSESSMENT
84 year-old female with a PMHx significant for HLD, HTN, thrombocytopenia, GERD, s/p R hip ORIF (9/2023) from home presents after a fall admitted with acute comminuted and displaced left intertrochanteric and femoral neck fracture    left hip fx  ortho following  s/p IMN  tolerated surg well  post op care per ortho  PT  WBAT    acute anemia  symptomatic  likely 2/2 blood loss during surg and fall  transfused 1 unit prbc     heart block  EP eval noted  hold AV blocker  tele monitor  - Would keep the patient with transcutaneous pads in place as backup should frequency of Mobitz 2 block significantly increase/patient develop symptomatic heart block or patient develops complete heart block requiring TCP.  atropine at bedside   plan for ILR as per EP     dizziness  check orthostatics  trial of meclizine     abnl tsh  repeat full thyroid panel    htn  meds as per cards  hold av blocker  check orthostatics    dvt ppx      Advanced care planning was discussed with patient and family.  Advanced care planning forms were reviewed and discussed as appropriate.  Differential diagnosis and plan of care discussed with patient after the evaluation.   Pain assessed and judicious use of narcotics when appropriate was discussed.  Importance of Fall prevention discussed.  Counseling on Smoking and Alcohol cessation was offered when appropriate.  Counseling on Diet, exercise, and medication compliance was done.

## 2024-11-08 NOTE — PROGRESS NOTE ADULT - PROBLEM SELECTOR PLAN 2
with fall   most consistent with orthostasis, which she has a history of   May be exacerbated by hypovolemia and hyponatremia
with fall   most consistent with orthostasis, which she has a history of   May be exacerbated by hypovolemia and hyponatremia

## 2024-11-08 NOTE — DISCHARGE NOTE PROVIDER - HOSPITAL COURSE
Hospital Course:    84 year-old female with a PMHx significant for HLD, HTN, thrombocytopenia, GERD, s/p R hip ORIF (9/2023) from home admitted after a fall admitted with acute comminuted and displaced left intertrochanteric and femoral neck fracture. Ortho saw pt and now she is s/p IMN. Pt was with acute anemia post op, likely 2/2 blood loss during surg and fall. Transfused 1 unit prbc . Also in heart block and EP consulted with recs to hold AV blocker. S/p ILR on 11/8. Trial of meclizine for dizziness. Pt with abnl tsh, repeat full thyroid panel sent, outpatient f/u with endocrine.    Medically cleared for discharge to [facility] per Dr. Pennington.    Important Medication Changes and Reason: see med rec    Active or Pending Issues Requiring Follow-up: f/u ortho, EP, PCP    Advanced Directives:   [X] Full code  [ ] DNR  [ ] Hospice    Discharge Diagnoses:  Displaced L intertrochanteric and femoral neck fracture  Post-op anemia  Heart Block  Dizziness  Abnormal TSH

## 2024-11-08 NOTE — PROGRESS NOTE ADULT - SUBJECTIVE AND OBJECTIVE BOX
AMIRAH GREEN  84y Female  MRN:[id number]    Patient is a 84y old  Female who presents with a chief complaint of fall    HPI:  84 year-old female with a PMHx significant for HLD, HTN, thrombocytopenia, GERD, s/p R hip ORIF (9/2023) from home presents after a fall. Patient states that she was exercising her shoulders, then sat down for 20 minutes to rest.  She stood up and walked to the kitchen when she lost her balance and fell on to the left side of her body. She is not sure if she felt lightheaded prior to the fall but she did not have any loss of consciousness.  Patient c/o left hip and leg pain and could not bear weight on extremity.  She has been having chronic lightheadedness/dizziness with imbalance as well as chronic sinus bradycardia 40's for some times, resulting in a fall last year s/p right hip ORIF.  Diltiazem was switched to Hydralazine.  Patient wore loop recorder for 1 week but asymptomatic and recorder did not capture any event.  Patient was evaluated by neurologist and treated with Triamterene/HCTZ and recently completed MRI brain/neck -reported normal (Dr. Rosenstein).  She was treated with Lexapro for anxiety, which recently has been increased.  Son states patient's blood pressures has been labile and Hydralazine/Losartan have been reduced from twice daily to once daily.      While on telemetry, patient was noted to have infrequent (3 captured episodes at time of review) episodes of Mobitz Type 2 AV block (06 Nov 2024 02:23)      Patient seen and evaluated at bedside. No acute events overnight except as noted.    Interval HPI: found to have left hip fx. pending OR today    PAST MEDICAL & SURGICAL HISTORY:  HTN (hypertension)      Hypercholesteremia      GERD (gastroesophageal reflux disease)      Arthritis      Thrombocytopenia      S/P ORIF (open reduction internal fixation) fracture          REVIEW OF SYSTEMS:  as per hpi     VITALS:  Vital Signs Last 24 Hrs  T(C): 37.3 (06 Nov 2024 11:00), Max: 37.4 (06 Nov 2024 04:00)  T(F): 99.2 (06 Nov 2024 11:00), Max: 99.4 (06 Nov 2024 04:00)  HR: 72 (06 Nov 2024 11:00) (56 - 72)  BP: 163/72 (06 Nov 2024 11:00) (121/58 - 166/60)  BP(mean): 78 (06 Nov 2024 00:36) (78 - 78)  RR: 18 (06 Nov 2024 11:00) (16 - 18)  SpO2: 97% (06 Nov 2024 11:00) (94% - 100%)    Parameters below as of 06 Nov 2024 11:00  Patient On (Oxygen Delivery Method): room air      CAPILLARY BLOOD GLUCOSE        I&O's Summary    05 Nov 2024 07:01  -  06 Nov 2024 07:00  --------------------------------------------------------  IN: 0 mL / OUT: 150 mL / NET: -150 mL    06 Nov 2024 07:01  -  06 Nov 2024 12:28  --------------------------------------------------------  IN: 0 mL / OUT: 0 mL / NET: 0 mL        PHYSICAL EXAM:  GENERAL: NAD, well-developed  HEAD:  Atraumatic, Normocephalic  EYES: EOMI, PERRLA, conjunctiva and sclera clear  NECK: Supple, No JVD  CHEST/LUNG: Clear to auscultation bilaterally; No wheeze  HEART: S1, S2; No murmurs, rubs, or gallops  ABDOMEN: Soft, Nontender, Nondistended; Bowel sounds present  EXTREMITIES:  2+ Peripheral Pulses, No clubbing, cyanosis, or edema  PSYCH: Normal affect  NEUROLOGY: AAOX3; non-focal  SKIN: No rashes or lesions    Consultant(s) Notes Reviewed:  [x ] YES  [ ] NO  Care Discussed with Consultants/Other Providers [ x] YES  [ ] NO    MEDS:  MEDICATIONS  (STANDING):  cholecalciferol 2000 Unit(s) Oral daily  escitalopram 10 milliGRAM(s) Oral daily  losartan 50 milliGRAM(s) Oral daily    MEDICATIONS  (PRN):  famotidine    Tablet 20 milliGRAM(s) Oral two times a day PRN for heartburn  morphine  - Injectable 2 milliGRAM(s) IV Push every 4 hours PRN Severe Pain (7 - 10)  ondansetron Injectable 4 milliGRAM(s) IV Push every 8 hours PRN Nausea and/or Vomiting    ALLERGIES:  No Known Allergies  amlodipine (Swelling)      LABS:                        10.6   9.53  )-----------( 128      ( 06 Nov 2024 06:01 )             30.3     11-06    131[L]  |  92[L]  |  26[H]  ----------------------------<  167[H]  3.7   |  26  |  0.96    Ca    9.0      06 Nov 2024 06:02    TPro  7.3  /  Alb  4.3  /  TBili  0.4  /  DBili  x   /  AST  37  /  ALT  23  /  AlkPhos  84  11-05    PT/INR - ( 06 Nov 2024 00:39 )   PT: 11.3 sec;   INR: 0.98 ratio         PTT - ( 06 Nov 2024 00:39 )  PTT:23.9 sec      LIVER FUNCTIONS - ( 05 Nov 2024 21:00 )  Alb: 4.3 g/dL / Pro: 7.3 g/dL / ALK PHOS: 84 U/L / ALT: 23 U/L / AST: 37 U/L / GGT: x           Urinalysis Basic - ( 06 Nov 2024 06:02 )    Color: x / Appearance: x / SG: x / pH: x  Gluc: 167 mg/dL / Ketone: x  / Bili: x / Urobili: x   Blood: x / Protein: x / Nitrite: x   Leuk Esterase: x / RBC: x / WBC x   Sq Epi: x / Non Sq Epi: x / Bacteria: x      TSH: Thyroid Stimulating Hormone, Serum: 9.04 uIU/mL (11-06 @ 06:02)     < from: CT Cervical Spine No Cont (11.05.24 @ 23:44) >    IMPRESSION:    CT HEAD:  No acute intracranial hemorrhage, mass effect, or midline shift.    CT CERVICAL SPINE:  No acute fracture or traumatic subluxation.  Multilevel degenerative changes .    --- End of Report ---    < end of copied text >  < from: Xray Femur 1 View, Left (11.05.24 @ 21:29) >  IMPRESSION:  There is an acute comminuted and displaced left intertrochanteric and   femoral neck fracture.    --- End of Report ---        < end of copied text >  
CARDIOLOGY CONSULT PROGRESS NOTE  AMIRAH GREEN  MRN-[id number]    INTERVAL EVENTS:  - tele: sinus, occ episodes of Mobitz 1 overnight  - No events ON, patient denies complaints. No symptoms reported during episodes of Mobitz 1, pt was asleep during episodes    ROS:  All other review of systems is negative unless indicated above.    MEDICATIONS  (STANDING):  cholecalciferol 2000 Unit(s) Oral daily  escitalopram 10 milliGRAM(s) Oral daily  losartan 50 milliGRAM(s) Oral daily    MEDICATIONS  (PRN):  famotidine    Tablet 20 milliGRAM(s) Oral two times a day PRN for heartburn  morphine  - Injectable 2 milliGRAM(s) IV Push every 4 hours PRN Severe Pain (7 - 10)  ondansetron Injectable 4 milliGRAM(s) IV Push every 8 hours PRN Nausea and/or Vomiting    Allergies    No Known Allergies    Intolerances    amlodipine (Swelling)    P/E:  Vital Signs Last 24 Hrs  T(C): 37.4 (06 Nov 2024 04:00), Max: 37.4 (06 Nov 2024 04:00)  T(F): 99.4 (06 Nov 2024 04:00), Max: 99.4 (06 Nov 2024 04:00)  HR: 68 (06 Nov 2024 04:00) (56 - 68)  BP: 121/58 (06 Nov 2024 04:00) (121/58 - 166/60)  BP(mean): 78 (06 Nov 2024 00:36) (78 - 78)  RR: 17 (06 Nov 2024 04:00) (16 - 18)  SpO2: 94% (06 Nov 2024 04:00) (94% - 100%)    Parameters below as of 06 Nov 2024 04:00  Patient On (Oxygen Delivery Method): room air      Daily Height in cm: 167.64 (05 Nov 2024 20:12)    Daily   I&O's Detail    05 Nov 2024 07:01  -  06 Nov 2024 07:00  --------------------------------------------------------  IN:  Total IN: 0 mL    OUT:    Voided (mL): 150 mL  Total OUT: 150 mL    Total NET: -150 mL      06 Nov 2024 07:01  -  06 Nov 2024 08:53  --------------------------------------------------------  IN:  Total IN: 0 mL    OUT:    Oral Fluid: 0 mL  Total OUT: 0 mL    Total NET: 0 mL        I&O's Summary    05 Nov 2024 07:01  -  06 Nov 2024 07:00  --------------------------------------------------------  IN: 0 mL / OUT: 150 mL / NET: -150 mL    06 Nov 2024 07:01  -  06 Nov 2024 08:53  --------------------------------------------------------  IN: 0 mL / OUT: 0 mL / NET: 0 mL      - gen: well appearing, laying in hospital bed, NAD  - HEENT: MMM, NCAT  - neck: no JVD, supple  - heart: RRR, nml S1/S2, no RMG  - lungs: CTABL, nml WOB  - abd: soft, NTND, NABS  - ext: WWP, PPP, no PEGGY    RELEVANT RECENT LABS/IMAGING/STUDIES:                        10.6   9.53  )-----------( 128      ( 06 Nov 2024 06:01 )             30.3     11-06    131[L]  |  92[L]  |  26[H]  ----------------------------<  167[H]  3.7   |  26  |  0.96    Ca    9.0      06 Nov 2024 06:02    TPro  7.3  /  Alb  4.3  /  TBili  0.4  /  DBili  x   /  AST  37  /  ALT  23  /  AlkPhos  84  11-05    LIVER FUNCTIONS - ( 05 Nov 2024 21:00 )  Alb: 4.3 g/dL / Pro: 7.3 g/dL / ALK PHOS: 84 U/L / ALT: 23 U/L / AST: 37 U/L / GGT: x           PT/INR - ( 06 Nov 2024 00:39 )   PT: 11.3 sec;   INR: 0.98 ratio         PTT - ( 06 Nov 2024 00:39 )  PTT:23.9 sec  --------------------------------------        --------------------------------------  
Orthopedic Surgery Progress Note     S: Patient seen and examined today. No acute events overnight. Pain is well controlled. Denies f/c, chest pain, shortness of breath, dizziness.    MEDICATIONS  (STANDING):  cholecalciferol 2000 Unit(s) Oral daily  escitalopram 10 milliGRAM(s) Oral daily  famotidine Injectable 20 milliGRAM(s) IV Push once  losartan 50 milliGRAM(s) Oral daily    MEDICATIONS  (PRN):  famotidine    Tablet 20 milliGRAM(s) Oral two times a day PRN for heartburn  morphine  - Injectable 2 milliGRAM(s) IV Push every 4 hours PRN Severe Pain (7 - 10)  ondansetron Injectable 4 milliGRAM(s) IV Push every 8 hours PRN Nausea and/or Vomiting      Physical Exam:  Gen: NAD  LLE: Skin intact, no ecchymosis,        SILT DP/SP/ Bita/Saph,        +EHL/FHL/TA/Gastroc,        Knee/ankle painless ROM,        hip ROM limited 2/2 pain,       DP+,        soft compartments, no calf ttp,        +log roll.    Vital Signs Last 24 Hrs  T(C): 37.4 (06 Nov 2024 04:00), Max: 37.4 (06 Nov 2024 04:00)  T(F): 99.4 (06 Nov 2024 04:00), Max: 99.4 (06 Nov 2024 04:00)  HR: 68 (06 Nov 2024 04:00) (56 - 68)  BP: 121/58 (06 Nov 2024 04:00) (121/58 - 166/60)  BP(mean): 78 (06 Nov 2024 00:36) (78 - 78)  RR: 17 (06 Nov 2024 04:00) (16 - 18)  SpO2: 94% (06 Nov 2024 04:00) (94% - 100%)    Parameters below as of 06 Nov 2024 04:00  Patient On (Oxygen Delivery Method): room air            LABS:                        10.7   11.53 )-----------( 117      ( 06 Nov 2024 00:39 )             31.4     11-06    130[L]  |  93[L]  |  28[H]  ----------------------------<  166[H]  3.8   |  25  |  0.99    Ca    9.0      06 Nov 2024 00:39    TPro  7.3  /  Alb  4.3  /  TBili  0.4  /  DBili  x   /  AST  37  /  ALT  23  /  AlkPhos  84  11-05  
SUBJECTIVE  1 unit pRBCs, post transfusion RBC 9.3.    No acute events overnight. Pain controlled. Denies fevers/chills, chest pain, or dyspnea.    OBJECTIVE  Vital Signs Last 24 Hrs  T(C): 36.8 (08 Nov 2024 04:00), Max: 37.2 (07 Nov 2024 16:20)  T(F): 98.2 (08 Nov 2024 04:00), Max: 98.9 (07 Nov 2024 16:20)  HR: 67 (08 Nov 2024 04:00) (58 - 72)  BP: 165/62 (08 Nov 2024 04:00) (112/62 - 165/62)  BP(mean): --  RR: 17 (08 Nov 2024 04:00) (17 - 18)  SpO2: 97% (08 Nov 2024 04:00) (96% - 98%)    Parameters below as of 08 Nov 2024 04:00  Patient On (Oxygen Delivery Method): room air        PHYSICAL EXAM  Gen: Lying in bed, NAD  Resp: even, nonlabored breathing   (LLE:  Dressing c/d/i, compartments soft, no calf TTP b/l  Motor: TA/EHL/GS/FHL intact  Sensory: DP/SP/Tib/Bita/Saph SILT  +DP pulse, WWP    LABS                        9.6    6.30  )-----------( 88       ( 08 Nov 2024 06:24 )             28.7     11-07    131[L]  |  95[L]  |  28[H]  ----------------------------<  140[H]  3.9   |  26  |  1.29    Ca    7.5[L]      07 Nov 2024 06:07  Mg     2.1     11-07          ASSESSMENT & PLAN  84yFemale s/p L IT FX POD 2.  -WBAT LE  -pain control  -ice/cold compress  -incentive spirometry  -DVT ppx  -PT/OT  -pending JACK
AMIRAH GREEN  84y Female  MRN:[id number]    Patient is a 84y old  Female who presents with a chief complaint of fall    HPI:  84 year-old female with a PMHx significant for HLD, HTN, thrombocytopenia, GERD, s/p R hip ORIF (9/2023) from home presents after a fall. Patient states that she was exercising her shoulders, then sat down for 20 minutes to rest.  She stood up and walked to the kitchen when she lost her balance and fell on to the left side of her body. She is not sure if she felt lightheaded prior to the fall but she did not have any loss of consciousness.  Patient c/o left hip and leg pain and could not bear weight on extremity.  She has been having chronic lightheadedness/dizziness with imbalance as well as chronic sinus bradycardia 40's for some times, resulting in a fall last year s/p right hip ORIF.  Diltiazem was switched to Hydralazine.  Patient wore loop recorder for 1 week but asymptomatic and recorder did not capture any event.  Patient was evaluated by neurologist and treated with Triamterene/HCTZ and recently completed MRI brain/neck -reported normal (Dr. Rosenstein).  She was treated with Lexapro for anxiety, which recently has been increased.  Son states patient's blood pressures has been labile and Hydralazine/Losartan have been reduced from twice daily to once daily.      While on telemetry, patient was noted to have infrequent (3 captured episodes at time of review) episodes of Mobitz Type 2 AV block (06 Nov 2024 02:23)      Patient seen and evaluated at bedside. No acute events overnight except as noted.    Interval HPI: s/p IMN      PAST MEDICAL & SURGICAL HISTORY:  HTN (hypertension)      Hypercholesteremia      GERD (gastroesophageal reflux disease)      Arthritis      Thrombocytopenia      S/P ORIF (open reduction internal fixation) fracture          REVIEW OF SYSTEMS:  as per hpi     VITALS:   Vital Signs Last 24 Hrs  T(C): 36.7 (07 Nov 2024 13:04), Max: 37.1 (06 Nov 2024 16:44)  T(F): 98.1 (07 Nov 2024 13:04), Max: 98.7 (06 Nov 2024 16:44)  HR: 68 (07 Nov 2024 13:04) (58 - 83)  BP: 123/63 (07 Nov 2024 13:04) (112/62 - 168/68)  BP(mean): 91 (06 Nov 2024 15:45) (88 - 99)  RR: 18 (07 Nov 2024 13:04) (16 - 18)  SpO2: 97% (07 Nov 2024 13:04) (95% - 100%)    Parameters below as of 07 Nov 2024 13:04  Patient On (Oxygen Delivery Method): room air          PHYSICAL EXAM:  GENERAL: NAD, well-developed  HEAD:  Atraumatic, Normocephalic  EYES: EOMI, PERRLA, conjunctiva and sclera clear  NECK: Supple, No JVD  CHEST/LUNG: Clear to auscultation bilaterally; No wheeze  HEART: S1, S2; No murmurs, rubs, or gallops  ABDOMEN: Soft, Nontender, Nondistended; Bowel sounds present  EXTREMITIES:  2+ Peripheral Pulses, No clubbing, cyanosis, or edema  PSYCH: Normal affect  NEUROLOGY: AAOX3; non-focal  SKIN: No rashes or lesions    Consultant(s) Notes Reviewed:  [x ] YES  [ ] NO  Care Discussed with Consultants/Other Providers [ x] YES  [ ] NO    MEDS:   MEDICATIONS  (STANDING):  acetaminophen     Tablet .. 975 milliGRAM(s) Oral every 8 hours  acetaminophen   IVPB .. 1000 milliGRAM(s) IV Intermittent once  cholecalciferol 2000 Unit(s) Oral daily  escitalopram 10 milliGRAM(s) Oral daily  losartan 50 milliGRAM(s) Oral daily  pantoprazole    Tablet 40 milliGRAM(s) Oral before breakfast  polyethylene glycol 3350 17 Gram(s) Oral at bedtime  senna 2 Tablet(s) Oral at bedtime  sodium chloride 0.9%. 1000 milliLiter(s) (100 mL/Hr) IV Continuous <Continuous>    MEDICATIONS  (PRN):  famotidine    Tablet 20 milliGRAM(s) Oral two times a day PRN for heartburn  HYDROmorphone  Injectable 0.25 milliGRAM(s) IV Push every 10 minutes PRN Moderate Pain (4 - 6)  magnesium hydroxide Suspension 30 milliLiter(s) Oral daily PRN Constipation  morphine  - Injectable 2 milliGRAM(s) IV Push every 4 hours PRN Severe Pain (7 - 10)  ondansetron Injectable 4 milliGRAM(s) IV Push every 8 hours PRN Nausea and/or Vomiting  ondansetron Injectable 4 milliGRAM(s) IV Push once PRN Nausea and/or Vomiting  ondansetron Injectable 4 milliGRAM(s) IV Push every 6 hours PRN Nausea and/or Vomiting  oxyCODONE    IR 2.5 milliGRAM(s) Oral every 4 hours PRN Moderate Pain (4 - 6)  oxyCODONE    IR 5 milliGRAM(s) Oral every 4 hours PRN Severe Pain (7 - 10)  traMADol 50 milliGRAM(s) Oral every 6 hours PRN Mild Pain (1 - 3)      ALLERGIES:  No Known Allergies  amlodipine (Swelling)      LABS:                                    7.9    8.71  )-----------( 99       ( 07 Nov 2024 06:07 )             23.3   11-07    131[L]  |  95[L]  |  28[H]  ----------------------------<  140[H]  3.9   |  26  |  1.29    Ca    7.5[L]      07 Nov 2024 06:07  Mg     2.1     11-07    TPro  7.3  /  Alb  4.3  /  TBili  0.4  /  DBili  x   /  AST  37  /  ALT  23  /  AlkPhos  84  11-05      TSH: Thyroid Stimulating Hormone, Serum: 9.04 uIU/mL (11-06 @ 06:02)     < from: CT Cervical Spine No Cont (11.05.24 @ 23:44) >    IMPRESSION:    CT HEAD:  No acute intracranial hemorrhage, mass effect, or midline shift.    CT CERVICAL SPINE:  No acute fracture or traumatic subluxation.  Multilevel degenerative changes .    --- End of Report ---    < end of copied text >  < from: Xray Femur 1 View, Left (11.05.24 @ 21:29) >  IMPRESSION:  There is an acute comminuted and displaced left intertrochanteric and   femoral neck fracture.    --- End of Report ---        < end of copied text >  
AMIRAH GREEN  84y Female  MRN:[id number]    Patient is a 84y old  Female who presents with a chief complaint of fall    HPI:  84 year-old female with a PMHx significant for HLD, HTN, thrombocytopenia, GERD, s/p R hip ORIF (9/2023) from home presents after a fall. Patient states that she was exercising her shoulders, then sat down for 20 minutes to rest.  She stood up and walked to the kitchen when she lost her balance and fell on to the left side of her body. She is not sure if she felt lightheaded prior to the fall but she did not have any loss of consciousness.  Patient c/o left hip and leg pain and could not bear weight on extremity.  She has been having chronic lightheadedness/dizziness with imbalance as well as chronic sinus bradycardia 40's for some times, resulting in a fall last year s/p right hip ORIF.  Diltiazem was switched to Hydralazine.  Patient wore loop recorder for 1 week but asymptomatic and recorder did not capture any event.  Patient was evaluated by neurologist and treated with Triamterene/HCTZ and recently completed MRI brain/neck -reported normal (Dr. Rosenstein).  She was treated with Lexapro for anxiety, which recently has been increased.  Son states patient's blood pressures has been labile and Hydralazine/Losartan have been reduced from twice daily to once daily.      While on telemetry, patient was noted to have infrequent (3 captured episodes at time of review) episodes of Mobitz Type 2 AV block (06 Nov 2024 02:23)      Patient seen and evaluated at bedside. No acute events overnight except as noted.    Interval HPI: no acute events o/n     PAST MEDICAL & SURGICAL HISTORY:  HTN (hypertension)      Hypercholesteremia      GERD (gastroesophageal reflux disease)      Arthritis      Thrombocytopenia      S/P ORIF (open reduction internal fixation) fracture          REVIEW OF SYSTEMS:  as per hpi     VITALS:   Vital Signs Last 24 Hrs  T(C): 36.6 (08 Nov 2024 11:05), Max: 37.2 (07 Nov 2024 16:20)  T(F): 97.9 (08 Nov 2024 11:05), Max: 98.9 (07 Nov 2024 16:20)  HR: 58 (08 Nov 2024 11:05) (58 - 72)  BP: 142/67 (08 Nov 2024 11:05) (123/63 - 165/62)  BP(mean): --  RR: 18 (08 Nov 2024 11:05) (17 - 18)  SpO2: 97% (08 Nov 2024 11:05) (96% - 98%)    Parameters below as of 08 Nov 2024 11:05  Patient On (Oxygen Delivery Method): room air          PHYSICAL EXAM:  GENERAL: NAD, well-developed  HEAD:  Atraumatic, Normocephalic  EYES: EOMI, PERRLA, conjunctiva and sclera clear  NECK: Supple, No JVD  CHEST/LUNG: Clear to auscultation bilaterally; No wheeze  HEART: S1, S2; No murmurs, rubs, or gallops  ABDOMEN: Soft, Nontender, Nondistended; Bowel sounds present  EXTREMITIES:  2+ Peripheral Pulses, No clubbing, cyanosis, or edema  PSYCH: Normal affect  NEUROLOGY: AAOX3; non-focal  SKIN: No rashes or lesions    Consultant(s) Notes Reviewed:  [x ] YES  [ ] NO  Care Discussed with Consultants/Other Providers [ x] YES  [ ] NO    MEDS:   MEDICATIONS  (STANDING):  acetaminophen     Tablet .. 975 milliGRAM(s) Oral every 8 hours  acetaminophen   IVPB .. 1000 milliGRAM(s) IV Intermittent once  cholecalciferol 2000 Unit(s) Oral daily  escitalopram 10 milliGRAM(s) Oral daily  losartan 50 milliGRAM(s) Oral daily  pantoprazole    Tablet 40 milliGRAM(s) Oral before breakfast  senna 2 Tablet(s) Oral at bedtime  sodium chloride 0.9%. 1000 milliLiter(s) (100 mL/Hr) IV Continuous <Continuous>    MEDICATIONS  (PRN):  benzocaine/menthol Lozenge 1 Lozenge Oral every 8 hours PRN Sore Throat  famotidine    Tablet 20 milliGRAM(s) Oral two times a day PRN for heartburn  HYDROmorphone  Injectable 0.25 milliGRAM(s) IV Push every 10 minutes PRN Moderate Pain (4 - 6)  magnesium hydroxide Suspension 30 milliLiter(s) Oral daily PRN Constipation  meclizine 12.5 milliGRAM(s) Oral two times a day PRN Dizziness  melatonin 3 milliGRAM(s) Oral at bedtime PRN Insomnia  morphine  - Injectable 2 milliGRAM(s) IV Push every 4 hours PRN Severe Pain (7 - 10)  ondansetron Injectable 4 milliGRAM(s) IV Push once PRN Nausea and/or Vomiting  ondansetron Injectable 4 milliGRAM(s) IV Push every 6 hours PRN Nausea and/or Vomiting  ondansetron Injectable 4 milliGRAM(s) IV Push every 8 hours PRN Nausea and/or Vomiting  oxyCODONE    IR 5 milliGRAM(s) Oral every 4 hours PRN Severe Pain (7 - 10)  oxyCODONE    IR 2.5 milliGRAM(s) Oral every 4 hours PRN Moderate Pain (4 - 6)  polyethylene glycol 3350 17 Gram(s) Oral daily PRN Constipation  traMADol 50 milliGRAM(s) Oral every 6 hours PRN Mild Pain (1 - 3)      ALLERGIES:  No Known Allergies  amlodipine (Swelling)      LABS:                                                      9.6    6.30  )-----------( 88       ( 08 Nov 2024 06:24 )             28.7   11-08    136  |  101  |  21  ----------------------------<  99  3.8   |  27  |  0.94    Ca    7.8[L]      08 Nov 2024 06:24  Mg     2.1     11-07        TSH: Thyroid Stimulating Hormone, Serum: 9.04 uIU/mL (11-06 @ 06:02)     < from: CT Cervical Spine No Cont (11.05.24 @ 23:44) >    IMPRESSION:    CT HEAD:  No acute intracranial hemorrhage, mass effect, or midline shift.    CT CERVICAL SPINE:  No acute fracture or traumatic subluxation.  Multilevel degenerative changes .    --- End of Report ---    < end of copied text >  < from: Xray Femur 1 View, Left (11.05.24 @ 21:29) >  IMPRESSION:  There is an acute comminuted and displaced left intertrochanteric and   femoral neck fracture.    --- End of Report ---        < end of copied text >  
CARDIOLOGY CONSULT PROGRESS NOTE  AMIRAH GREEN  MRN-[id number]    INTERVAL EVENTS:  - tele: sinus karen to sinus rhythm, one episode of 2:1 AV block  - S/p L hip IMN yesterday without complication  - Pt reports L hip pain but otherwise without CV complaints    ROS:  All other review of systems is negative unless indicated above.    MEDICATIONS  (STANDING):  acetaminophen     Tablet .. 975 milliGRAM(s) Oral every 8 hours  acetaminophen   IVPB .. 1000 milliGRAM(s) IV Intermittent once  cholecalciferol 2000 Unit(s) Oral daily  cholecalciferol 2000 Unit(s) Oral daily  enoxaparin Injectable 30 milliGRAM(s) SubCutaneous every 24 hours  escitalopram 10 milliGRAM(s) Oral daily  losartan 50 milliGRAM(s) Oral daily  pantoprazole    Tablet 40 milliGRAM(s) Oral before breakfast  polyethylene glycol 3350 17 Gram(s) Oral at bedtime  senna 2 Tablet(s) Oral at bedtime  sodium chloride 0.9%. 1000 milliLiter(s) (100 mL/Hr) IV Continuous <Continuous>    MEDICATIONS  (PRN):  famotidine    Tablet 20 milliGRAM(s) Oral two times a day PRN for heartburn  HYDROmorphone  Injectable 0.25 milliGRAM(s) IV Push every 10 minutes PRN Moderate Pain (4 - 6)  magnesium hydroxide Suspension 30 milliLiter(s) Oral daily PRN Constipation  morphine  - Injectable 2 milliGRAM(s) IV Push every 4 hours PRN Severe Pain (7 - 10)  ondansetron Injectable 4 milliGRAM(s) IV Push every 8 hours PRN Nausea and/or Vomiting  ondansetron Injectable 4 milliGRAM(s) IV Push every 6 hours PRN Nausea and/or Vomiting  ondansetron Injectable 4 milliGRAM(s) IV Push once PRN Nausea and/or Vomiting  oxyCODONE    IR 2.5 milliGRAM(s) Oral every 4 hours PRN Moderate Pain (4 - 6)  oxyCODONE    IR 5 milliGRAM(s) Oral every 4 hours PRN Severe Pain (7 - 10)  traMADol 50 milliGRAM(s) Oral every 6 hours PRN Mild Pain (1 - 3)    Allergies    No Known Allergies    Intolerances    amlodipine (Swelling)    P/E:  Vital Signs Last 24 Hrs  T(C): 36.7 (07 Nov 2024 04:55), Max: 38.5 (06 Nov 2024 12:35)  T(F): 98 (07 Nov 2024 04:55), Max: 101.3 (06 Nov 2024 12:35)  HR: 66 (07 Nov 2024 08:13) (59 - 83)  BP: 129/64 (07 Nov 2024 08:13) (123/58 - 168/68)  BP(mean): 91 (06 Nov 2024 15:45) (78 - 99)  RR: 18 (07 Nov 2024 08:13) (16 - 18)  SpO2: 96% (07 Nov 2024 08:13) (94% - 100%)    Parameters below as of 07 Nov 2024 08:13  Patient On (Oxygen Delivery Method): room air      Daily Height in cm: 167.6 (06 Nov 2024 12:35)    Daily   I&O's Detail    06 Nov 2024 07:01  -  07 Nov 2024 07:00  --------------------------------------------------------  IN:    sodium chloride 0.9%: 250 mL  Total IN: 250 mL    OUT:    Oral Fluid: 0 mL  Total OUT: 0 mL    Total NET: 250 mL        I&O's Summary    06 Nov 2024 07:01  -  07 Nov 2024 07:00  --------------------------------------------------------  IN: 250 mL / OUT: 0 mL / NET: 250 mL      - gen: well appearing, laying in hospital bed, NAD  - HEENT: MMM, NCAT  - neck: no JVD, supple  - heart: RRR, nml S1/S2, no RMG  - lungs: CTABL, nml WOB  - abd: soft, NTND, NABS  - ext: WWP, PPP, no PEGGY    RELEVANT RECENT LABS/IMAGING/STUDIES:                        7.9    8.71  )-----------( 99       ( 07 Nov 2024 06:07 )             23.3     11-07    131[L]  |  95[L]  |  28[H]  ----------------------------<  140[H]  3.9   |  26  |  1.29    Ca    7.5[L]      07 Nov 2024 06:07  Mg     2.1     11-07    TPro  7.3  /  Alb  4.3  /  TBili  0.4  /  DBili  x   /  AST  37  /  ALT  23  /  AlkPhos  84  11-05    LIVER FUNCTIONS - ( 05 Nov 2024 21:00 )  Alb: 4.3 g/dL / Pro: 7.3 g/dL / ALK PHOS: 84 U/L / ALT: 23 U/L / AST: 37 U/L / GGT: x           PT/INR - ( 06 Nov 2024 00:39 )   PT: 11.3 sec;   INR: 0.98 ratio         PTT - ( 06 Nov 2024 00:39 )  PTT:23.9 sec  --------------------------------------        --------------------------------------  
Patient seen and examined at bedside, resting comfortably. Pain is controlled. Denies CP, SOB, fever, chills, numbness/tingling or any other complaints.     LABS:                        10.6   9.53  )-----------( 128      ( 06 Nov 2024 06:01 )             30.3     11-06    131[L]  |  92[L]  |  26[H]  ----------------------------<  167[H]  3.7   |  26  |  0.96    Ca    9.0      06 Nov 2024 06:02    TPro  7.3  /  Alb  4.3  /  TBili  0.4  /  DBili  x   /  AST  37  /  ALT  23  /  AlkPhos  84  11-05    PT/INR - ( 06 Nov 2024 00:39 )   PT: 11.3 sec;   INR: 0.98 ratio         PTT - ( 06 Nov 2024 00:39 )  PTT:23.9 sec  Urinalysis Basic - ( 06 Nov 2024 06:02 )    Color: x / Appearance: x / SG: x / pH: x  Gluc: 167 mg/dL / Ketone: x  / Bili: x / Urobili: x   Blood: x / Protein: x / Nitrite: x   Leuk Esterase: x / RBC: x / WBC x   Sq Epi: x / Non Sq Epi: x / Bacteria: x        VITAL SIGNS:  T(C): 36.7 (11-07-24 @ 04:55), Max: 38.5 (11-06-24 @ 12:35)  HR: 67 (11-07-24 @ 04:55) (59 - 83)  BP: 126/58 (11-07-24 @ 04:55) (123/58 - 168/68)  RR: 18 (11-07-24 @ 04:55) (16 - 18)  SpO2: 96% (11-07-24 @ 04:55) (94% - 100%)    PE:   LLE:   Dressing c/d/i  + EHL/FHL/GS/TA  SILT TIb/SPN/DPN/Sural/Saph  2+ Dp  Compartments soft and compressible    AP: 84F s/p L Hip IMN POD1    WBAT, PT/OT  PAin control prn  DVT PPx: Lovenox  Dispo: Pending PT Eval  Discussed plan w/ Dr. Martinez who agrees. 
24H hour events:   Seen sitting in chair; feels comfortable without complaints, no acute changes overnight    MEDICATIONS:  losartan 50 milliGRAM(s) Oral daily  acetaminophen     Tablet .. 975 milliGRAM(s) Oral every 8 hours  acetaminophen   IVPB .. 1000 milliGRAM(s) IV Intermittent once  escitalopram 10 milliGRAM(s) Oral daily  HYDROmorphone  Injectable 0.25 milliGRAM(s) IV Push every 10 minutes PRN  melatonin 3 milliGRAM(s) Oral at bedtime PRN  morphine  - Injectable 2 milliGRAM(s) IV Push every 4 hours PRN  ondansetron Injectable 4 milliGRAM(s) IV Push once PRN  ondansetron Injectable 4 milliGRAM(s) IV Push every 6 hours PRN  ondansetron Injectable 4 milliGRAM(s) IV Push every 8 hours PRN  oxyCODONE    IR 5 milliGRAM(s) Oral every 4 hours PRN  oxyCODONE    IR 2.5 milliGRAM(s) Oral every 4 hours PRN  traMADol 50 milliGRAM(s) Oral every 6 hours PRN  famotidine    Tablet 20 milliGRAM(s) Oral two times a day PRN  magnesium hydroxide Suspension 30 milliLiter(s) Oral daily PRN  pantoprazole    Tablet 40 milliGRAM(s) Oral before breakfast  polyethylene glycol 3350 17 Gram(s) Oral daily PRN  senna 2 Tablet(s) Oral at bedtime  benzocaine/menthol Lozenge 1 Lozenge Oral every 8 hours PRN  cholecalciferol 2000 Unit(s) Oral daily  sodium chloride 0.9%. 1000 milliLiter(s) IV Continuous <Continuous>      REVIEW OF SYSTEMS:  Complete 12point ROS negative.    PHYSICAL EXAM:  T(C): 36.8 (11-08-24 @ 04:00), Max: 37.2 (11-07-24 @ 16:20)  HR: 67 (11-08-24 @ 04:00) (58 - 72)  BP: 165/62 (11-08-24 @ 04:00) (112/62 - 165/62)  RR: 17 (11-08-24 @ 04:00) (17 - 18)  SpO2: 97% (11-08-24 @ 04:00) (96% - 98%)  Wt(kg): --  I&O's Summary    07 Nov 2024 07:01  -  08 Nov 2024 07:00  --------------------------------------------------------  IN: 480 mL / OUT: 2450 mL / NET: -1970 mL        Appearance: Normal	  HEENT:   Normal oral mucosa, PERRL, EOMI	  Lymphatic: No lymphadenopathy  Cardiovascular: Normal S1 S2, No JVD, No murmurs, No edema  Respiratory: Lungs clear to auscultation	  Psychiatry: A & O x 3, Mood & affect appropriate  Gastrointestinal:  Soft, Non-tender, + BS	  Skin: No rashes, No ecchymoses, No cyanosis	  Neurologic: Non-focal  Extremities: Normal range of motion, No clubbing, cyanosis or edema  Vascular: Peripheral pulses palpable 2+ bilaterally        LABS:	 	    CBC Full  -  ( 08 Nov 2024 06:24 )  WBC Count : 6.30 K/uL  Hemoglobin : 9.6 g/dL  Hematocrit : 28.7 %  Platelet Count - Automated : 88 K/uL  Mean Cell Volume : 87.8 fl  Mean Cell Hemoglobin : 29.4 pg  Mean Cell Hemoglobin Concentration : 33.4 g/dL  Auto Neutrophil # : x  Auto Lymphocyte # : x  Auto Monocyte # : x  Auto Eosinophil # : x  Auto Basophil # : x  Auto Neutrophil % : x  Auto Lymphocyte % : x  Auto Monocyte % : x  Auto Eosinophil % : x  Auto Basophil % : x    11-08    136  |  101  |  21  ----------------------------<  99  3.8   |  27  |  0.94  11-07    131[L]  |  95[L]  |  28[H]  ----------------------------<  140[H]  3.9   |  26  |  1.29    Ca    7.8[L]      08 Nov 2024 06:24  Ca    7.5[L]      07 Nov 2024 06:07  Mg     2.1     11-07        proBNP:   Lipid Profile:   HgA1c:   TSH:       CARDIAC MARKERS:      TELEMETRY: NSR 60s  	    Echo 11/6/24:    CONCLUSIONS:      1. Left ventricular systolic function is normal with an ejection fraction of 73 % by Giron's method of disks. There are no regional wall motion abnormalities seen.   2. Mildly enlarged right ventricular cavity size and normal right ventricular systolic function. Tricuspid annular plane systolic excursion (TAPSE) is 2.6 cm (normal >=1.7 cm). Tricuspid annular tissue Doppler S' is 15.0 cm/s (normal >10 cm/s).   3. Estimated pulmonary artery systolic pressure is 36 mmHg.   4. The inferior vena cava is normal in size (normal <2.1cm) with normal inspiratory collapse (normal >50%) consistent with normal right atrial pressure (~3, range 0-5mmHg).   5. There isnormal LV mass and normal geometry.      
Subjective: Patient seen and examined. No new events except as noted.   s/p IM Nail   pain controlled     REVIEW OF SYSTEMS:    CONSTITUTIONAL: + weakness, fevers or chills  EYES/ENT: No visual changes;  No vertigo or throat pain   NECK: No pain or stiffness  RESPIRATORY: No cough, wheezing, hemoptysis; No shortness of breath  CARDIOVASCULAR: No chest pain or palpitations  GASTROINTESTINAL: No abdominal or epigastric pain. No nausea, vomiting, or hematemesis; No diarrhea or constipation. No melena or hematochezia.  GENITOURINARY: No dysuria, frequency or hematuria  NEUROLOGICAL: No numbness or weakness  SKIN: No itching, burning, rashes, or lesions   All other review of systems is negative unless indicated above.    MEDICATIONS:  MEDICATIONS  (STANDING):  acetaminophen     Tablet .. 975 milliGRAM(s) Oral every 8 hours  acetaminophen   IVPB .. 1000 milliGRAM(s) IV Intermittent once  cholecalciferol 2000 Unit(s) Oral daily  cholecalciferol 2000 Unit(s) Oral daily  enoxaparin Injectable 30 milliGRAM(s) SubCutaneous every 24 hours  escitalopram 10 milliGRAM(s) Oral daily  losartan 50 milliGRAM(s) Oral daily  pantoprazole    Tablet 40 milliGRAM(s) Oral before breakfast  polyethylene glycol 3350 17 Gram(s) Oral at bedtime  senna 2 Tablet(s) Oral at bedtime  sodium chloride 0.9%. 1000 milliLiter(s) (100 mL/Hr) IV Continuous <Continuous>      PHYSICAL EXAM:  T(C): 36.7 (11-07-24 @ 04:55), Max: 38.5 (11-06-24 @ 12:35)  HR: 66 (11-07-24 @ 08:13) (59 - 83)  BP: 129/64 (11-07-24 @ 08:13) (123/58 - 168/68)  RR: 18 (11-07-24 @ 08:13) (16 - 18)  SpO2: 96% (11-07-24 @ 08:13) (94% - 100%)  Wt(kg): --  I&O's Summary    06 Nov 2024 07:01  -  07 Nov 2024 07:00  --------------------------------------------------------  IN: 250 mL / OUT: 0 mL / NET: 250 mL      Height (cm): 167.6 (11-06 @ 12:35)  Weight (kg): 90.7 (11-06 @ 12:35)  BMI (kg/m2): 32.3 (11-06 @ 12:35)  BSA (m2): 2 (11-06 @ 12:35)    Appearance: NAD	  HEENT:   Dry oral mucosa, PERRL, EOMI	  Lymphatic: No lymphadenopathy , no edema  Cardiovascular: Normal S1 S2, No JVD, No murmurs , Peripheral pulses palpable 2+ bilaterally  Respiratory: Lungs clear to auscultation, normal effort 	  Gastrointestinal:  Soft, Non-tender, + BS	  Skin: No rashes, No ecchymoses, No cyanosis, warm to touch  Musculoskeletal: Normal range of motion, normal strength  Psychiatry:  Mood & affect appropriate  Ext: LLE:   Dressing c/d/i  + EHL/FHL/GS/TA  SILT TIb/SPN/DPN/Sural/Saph  2+ Dp  Compartments soft and compressible    LABS:    CARDIAC MARKERS:                                7.9    8.71  )-----------( 99       ( 07 Nov 2024 06:07 )             23.3     11-07    131[L]  |  95[L]  |  28[H]  ----------------------------<  140[H]  3.9   |  26  |  1.29    Ca    7.5[L]      07 Nov 2024 06:07  Mg     2.1     11-07    TPro  7.3  /  Alb  4.3  /  TBili  0.4  /  DBili  x   /  AST  37  /  ALT  23  /  AlkPhos  84  11-05        TELEMETRY: 	 sinus karen to sinus rhythm, one episode of 2:1 AV block     ECG:  	  RADIOLOGY:   DIAGNOSTIC TESTING:  [ ] Echocardiogram:  [ ]  Catheterization:  [ ] Stress Test:    OTHER: 	          
Subjective: Patient seen and examined. No new events except as noted.   resting comfortably   s/p 1 unit pRBCs  REVIEW OF SYSTEMS:    CONSTITUTIONAL: + weakness, fevers or chills  EYES/ENT: No visual changes;  No vertigo or throat pain   NECK: No pain or stiffness  RESPIRATORY: No cough, wheezing, hemoptysis; No shortness of breath  CARDIOVASCULAR: No chest pain or palpitations  GASTROINTESTINAL: No abdominal or epigastric pain. No nausea, vomiting, or hematemesis; No diarrhea or constipation. No melena or hematochezia.  GENITOURINARY: No dysuria, frequency or hematuria  NEUROLOGICAL: No numbness or weakness  SKIN: No itching, burning, rashes, or lesions   All other review of systems is negative unless indicated above.    MEDICATIONS:  MEDICATIONS  (STANDING):  acetaminophen     Tablet .. 975 milliGRAM(s) Oral every 8 hours  acetaminophen   IVPB .. 1000 milliGRAM(s) IV Intermittent once  cholecalciferol 2000 Unit(s) Oral daily  escitalopram 10 milliGRAM(s) Oral daily  losartan 50 milliGRAM(s) Oral daily  pantoprazole    Tablet 40 milliGRAM(s) Oral before breakfast  senna 2 Tablet(s) Oral at bedtime  sodium chloride 0.9%. 1000 milliLiter(s) (100 mL/Hr) IV Continuous <Continuous>      PHYSICAL EXAM:  T(C): 36.8 (11-08-24 @ 04:00), Max: 37.2 (11-07-24 @ 16:20)  HR: 67 (11-08-24 @ 04:00) (58 - 72)  BP: 165/62 (11-08-24 @ 04:00) (112/62 - 165/62)  RR: 17 (11-08-24 @ 04:00) (17 - 18)  SpO2: 97% (11-08-24 @ 04:00) (96% - 98%)  Wt(kg): --  I&O's Summary    07 Nov 2024 07:01  -  08 Nov 2024 07:00  --------------------------------------------------------  IN: 480 mL / OUT: 2450 mL / NET: -1970 mL        Appearance: NAD	  HEENT:   Dry oral mucosa, PERRL, EOMI	  Lymphatic: No lymphadenopathy , no edema  Cardiovascular: Normal S1 S2, No JVD, No murmurs , Peripheral pulses palpable 2+ bilaterally  Respiratory: Lungs clear to auscultation, normal effort 	  Gastrointestinal:  Soft, Non-tender, + BS	  Skin: No rashes, No ecchymoses, No cyanosis, warm to touch  Musculoskeletal: Normal range of motion, normal strength  Psychiatry:  Mood & affect appropriate  Ext: LLE:   Dressing c/d/i  + EHL/FHL/GS/TA  SILT TIb/SPN/DPN/Sural/Saph  2+ Dp  Compartments soft and compressible    LABS:    CARDIAC MARKERS:                                9.6    6.30  )-----------( 88       ( 08 Nov 2024 06:24 )             28.7     11-08    136  |  101  |  21  ----------------------------<  99  3.8   |  27  |  0.94    Ca    7.8[L]      08 Nov 2024 06:24  Mg     2.1     11-07        TELEMETRY: 	  SR  ECG:  	  RADIOLOGY:   DIAGNOSTIC TESTING:  [ ] Echocardiogram:  [ ]  Catheterization:  [ ] Stress Test:    OTHER:

## 2024-11-08 NOTE — DISCHARGE NOTE NURSING/CASE MANAGEMENT/SOCIAL WORK - FINANCIAL ASSISTANCE
[facility] provides services at a reduced cost to those who are determined to be eligible through [facility]’s financial assistance program. Information regarding [facility]’s financial assistance program can be found by going to [email or web address] or by calling [phone].

## 2024-11-08 NOTE — DISCHARGE NOTE PROVIDER - NPI NUMBER (FOR SYSADMIN USE ONLY) :
I have reviewed and confirmed nurses' notes for patient's medications, allergies, medical history, and surgical history.
[[id number]]

## 2024-11-08 NOTE — DISCHARGE NOTE PROVIDER - PROVIDER TOKENS
PROVIDER:[TOKEN:[[id number]],FOLLOWUP:[1 week]],PROVIDER:[TOKEN:[[id number]],FOLLOWUP:[1 week]],PROVIDER:[TOKEN:[[id number]],FOLLOWUP:[1 week]],PROVIDER:[TOKEN:[[id number]]]

## 2024-11-08 NOTE — PROGRESS NOTE ADULT - ASSESSMENT
83 yo F with a PMHx significant for HLD, HTN, GERD, R hip fx 2023 s/p ORIF who presented with a fall at home, found to have a L hip fx. EP consulted for bradycardia and episodes of 2nd deg AV block. On tele review, appears c/w Mobitz 1 AV block while sleeping, which does not require EP intervention.     1. Bradycardia, 2nd Degree AV Block  2. s/p Fall    - S/p L hip IMN 11/6 without complication  - Monitor on tele, no episode on 2nd degree AV Block since 11/6  - TSH elevated, add on FT4, T3, endo consult if hypothyroidism confirmed  - Hold AVN blockers  - Plan for ILR implant today, awaiting son    #[phone]

## 2024-11-08 NOTE — DISCHARGE NOTE PROVIDER - NSDCCPCAREPLAN_GEN_ALL_CORE_FT
PRINCIPAL DISCHARGE DIAGNOSIS  Diagnosis: Dizziness  Assessment and Plan of Treatment: Started Meclizine for dizziness  Follow up with primary care doctor and EP.  You had abnormal heart block on telemetry monitor requiring EP consult. You have a Loop recorder implanted 11/8.  Follow up with EP      SECONDARY DISCHARGE DIAGNOSES  Diagnosis: Fracture, intertrochanteric, left femur  Assessment and Plan of Treatment: You had an IM nail with orthopedics.  Recommended for rehab.  Please follow up with orthopedics upon discharge.     PRINCIPAL DISCHARGE DIAGNOSIS  Diagnosis: Dizziness  Assessment and Plan of Treatment: Started Meclizine for dizziness  Follow up with primary care doctor and EP/cardiology.  You had abnormal heart block on telemetry monitor requiring EP consult. You have a Loop recorder implanted 11/8.      SECONDARY DISCHARGE DIAGNOSES  Diagnosis: Fracture, intertrochanteric, left femur  Assessment and Plan of Treatment: You had an IM nail with orthopedics.  Recommended for rehab.  Please follow up with orthopedics upon discharge.    Diagnosis: Abnormal TSH  Assessment and Plan of Treatment: Your TSH level was high indicating your thyroid function may be slowed. Please continue to follow up with endocrinology for further managment of this.

## 2024-11-08 NOTE — DISCHARGE NOTE PROVIDER - CARE PROVIDER_API CALL
Joel Martinez  Orthopaedic Surgery  [address], [address]  [place] [address]  Phone: [phone]  Fax: [phone]  Follow Up Time: 1 week    Matthew Concepcion  Endocrinology/Metab/Diabetes  [address], [address]  [place] [address]  Phone: [phone]  Fax: [phone]  Follow Up Time: 1 week    Frandy Doe  Cardiovascular Disease  [address], [address]  [place] [address]  Phone: [phone]  Fax: [phone]  Follow Up Time: 1 week    Antoni Varner  Cardiac Electrophysiology  [address] [place] [address]  Phone: [phone]  Fax: [phone]  Follow Up Time:

## 2024-11-08 NOTE — DISCHARGE NOTE PROVIDER - CARE PROVIDERS DIRECT ADDRESSES
,[email or web address]

## 2024-11-08 NOTE — DISCHARGE NOTE PROVIDER - NSDCFUSCHEDAPPT_GEN_ALL_CORE_FT
Av Pina  [facility] Physician [facility]  OTOLARYNG [address]  Scheduled Appointment: 11/20/2024    [facility]  DENTAL  [facility]  Scheduled Appointment: 11/26/2024    Matthew Concepcion  [facility]  ENDOCRIN [address]  Scheduled Appointment: 12/04/2024     [facility] Physician [facility]  ELECTROPH [address]  Scheduled Appointment: 11/20/2024    Av Pina  [facility]  OTOLARYNG [address]  Scheduled Appointment: 11/20/2024    [facility]  DENTAL  [address]  Scheduled Appointment: 11/26/2024    Matthew Concepcion  [facility]  ENDOCRIN [address] [place] [address]  Scheduled Appointment: 12/04/2024

## 2024-11-11 ENCOUNTER — NON-APPOINTMENT (OUTPATIENT)
Age: 84
End: 2024-11-11

## 2024-11-13 NOTE — ED ADULT NURSE NOTE - MUSCLE PAIN OR WEAKNESS
[yes

## 2024-11-20 ENCOUNTER — APPOINTMENT (OUTPATIENT)
Dept: ELECTROPHYSIOLOGY | Facility: CLINIC | Age: 84
End: 2024-11-20

## 2024-11-20 ENCOUNTER — APPOINTMENT (OUTPATIENT)
Dept: OTOLARYNGOLOGY | Facility: CLINIC | Age: 84
End: 2024-11-20

## 2024-11-20 DIAGNOSIS — R42 DIZZINESS AND GIDDINESS: ICD-10-CM

## 2024-11-20 PROCEDURE — 99214 OFFICE O/P EST MOD 30 MIN: CPT

## 2024-11-25 DIAGNOSIS — I82.492 ACUTE EMBOLISM AND THROMBOSIS OF OTHER SPECIFIED DEEP VEIN OF LEFT LOWER EXTREMITY: ICD-10-CM

## 2024-11-25 RX ORDER — APIXABAN 2.5 MG/1
2.5 TABLET, FILM COATED ORAL
Qty: 180 | Refills: 3 | Status: ACTIVE | COMMUNITY
Start: 2024-11-25

## 2024-11-26 ENCOUNTER — APPOINTMENT (OUTPATIENT)
Age: 84
End: 2024-11-26
Payer: MEDICARE

## 2024-11-26 PROCEDURE — 84480 ASSAY TRIIODOTHYRONINE (T3): CPT

## 2024-11-26 PROCEDURE — 96375 TX/PRO/DX INJ NEW DRUG ADDON: CPT

## 2024-11-26 PROCEDURE — 97116 GAIT TRAINING THERAPY: CPT

## 2024-11-26 PROCEDURE — 36415 COLL VENOUS BLD VENIPUNCTURE: CPT

## 2024-11-26 PROCEDURE — 70450 CT HEAD/BRAIN W/O DYE: CPT | Mod: MC

## 2024-11-26 PROCEDURE — 73551 X-RAY EXAM OF FEMUR 1: CPT

## 2024-11-26 PROCEDURE — PLD5: CUSTOM

## 2024-11-26 PROCEDURE — 86900 BLOOD TYPING SEROLOGIC ABO: CPT

## 2024-11-26 PROCEDURE — 71045 X-RAY EXAM CHEST 1 VIEW: CPT

## 2024-11-26 PROCEDURE — 96374 THER/PROPH/DIAG INJ IV PUSH: CPT

## 2024-11-26 PROCEDURE — 84484 ASSAY OF TROPONIN QUANT: CPT

## 2024-11-26 PROCEDURE — C1764: CPT

## 2024-11-26 PROCEDURE — 93306 TTE W/DOPPLER COMPLETE: CPT

## 2024-11-26 PROCEDURE — 85025 COMPLETE CBC W/AUTO DIFF WBC: CPT

## 2024-11-26 PROCEDURE — C1713: CPT

## 2024-11-26 PROCEDURE — 36430 TRANSFUSION BLD/BLD COMPNT: CPT

## 2024-11-26 PROCEDURE — 76000 FLUOROSCOPY <1 HR PHYS/QHP: CPT

## 2024-11-26 PROCEDURE — 93005 ELECTROCARDIOGRAM TRACING: CPT

## 2024-11-26 PROCEDURE — 85730 THROMBOPLASTIN TIME PARTIAL: CPT

## 2024-11-26 PROCEDURE — 97166 OT EVAL MOD COMPLEX 45 MIN: CPT

## 2024-11-26 PROCEDURE — 80048 BASIC METABOLIC PNL TOTAL CA: CPT

## 2024-11-26 PROCEDURE — 85027 COMPLETE CBC AUTOMATED: CPT

## 2024-11-26 PROCEDURE — 73502 X-RAY EXAM HIP UNI 2-3 VIEWS: CPT

## 2024-11-26 PROCEDURE — 86901 BLOOD TYPING SEROLOGIC RH(D): CPT

## 2024-11-26 PROCEDURE — 85610 PROTHROMBIN TIME: CPT

## 2024-11-26 PROCEDURE — 33285 INSJ SUBQ CAR RHYTHM MNTR: CPT

## 2024-11-26 PROCEDURE — 86923 COMPATIBILITY TEST ELECTRIC: CPT

## 2024-11-26 PROCEDURE — 84436 ASSAY OF TOTAL THYROXINE: CPT

## 2024-11-26 PROCEDURE — 97162 PT EVAL MOD COMPLEX 30 MIN: CPT

## 2024-11-26 PROCEDURE — 87086 URINE CULTURE/COLONY COUNT: CPT

## 2024-11-26 PROCEDURE — 86850 RBC ANTIBODY SCREEN: CPT

## 2024-11-26 PROCEDURE — 99285 EMERGENCY DEPT VISIT HI MDM: CPT

## 2024-11-26 PROCEDURE — P9016: CPT

## 2024-11-26 PROCEDURE — 81001 URINALYSIS AUTO W/SCOPE: CPT

## 2024-11-26 PROCEDURE — 80053 COMPREHEN METABOLIC PANEL: CPT

## 2024-11-26 PROCEDURE — 84443 ASSAY THYROID STIM HORMONE: CPT

## 2024-11-26 PROCEDURE — 97110 THERAPEUTIC EXERCISES: CPT

## 2024-11-26 PROCEDURE — 72125 CT NECK SPINE W/O DYE: CPT | Mod: MC

## 2024-11-26 PROCEDURE — 83735 ASSAY OF MAGNESIUM: CPT

## 2024-12-04 ENCOUNTER — APPOINTMENT (OUTPATIENT)
Dept: ENDOCRINOLOGY | Facility: CLINIC | Age: 84
End: 2024-12-04
Payer: MEDICARE

## 2024-12-04 VITALS
HEART RATE: 56 BPM | WEIGHT: 112 LBS | BODY MASS INDEX: 25.19 KG/M2 | DIASTOLIC BLOOD PRESSURE: 70 MMHG | OXYGEN SATURATION: 95 % | SYSTOLIC BLOOD PRESSURE: 142 MMHG | HEIGHT: 56 IN

## 2024-12-04 PROCEDURE — 96372 THER/PROPH/DIAG INJ SC/IM: CPT

## 2024-12-04 PROCEDURE — 99214 OFFICE O/P EST MOD 30 MIN: CPT | Mod: 25

## 2024-12-04 RX ORDER — DENOSUMAB 60 MG/ML
60 INJECTION SUBCUTANEOUS
Qty: 1 | Refills: 0 | Status: COMPLETED | OUTPATIENT
Start: 2024-12-04

## 2024-12-04 RX ADMIN — DENOSUMAB 60 MG/ML: 60 INJECTION SUBCUTANEOUS at 00:00

## 2024-12-05 ENCOUNTER — APPOINTMENT (OUTPATIENT)
Age: 84
End: 2024-12-05
Payer: MEDICARE

## 2024-12-05 PROCEDURE — D0171: CPT | Mod: NC

## 2024-12-12 ENCOUNTER — APPOINTMENT (OUTPATIENT)
Age: 84
End: 2024-12-12
Payer: MEDICARE

## 2024-12-12 PROCEDURE — PIP: CUSTOM

## 2025-01-02 ENCOUNTER — APPOINTMENT (OUTPATIENT)
Age: 85
End: 2025-01-02

## 2025-01-02 PROCEDURE — ZZZZZ: CPT

## 2025-01-03 ENCOUNTER — APPOINTMENT (OUTPATIENT)
Dept: ELECTROPHYSIOLOGY | Facility: CLINIC | Age: 85
End: 2025-01-03
Payer: MEDICARE

## 2025-01-03 ENCOUNTER — NON-APPOINTMENT (OUTPATIENT)
Age: 85
End: 2025-01-03

## 2025-01-03 PROCEDURE — 93298 REM INTERROG DEV EVAL SCRMS: CPT

## 2025-01-09 PROBLEM — R06.02 SHORTNESS OF BREATH: Status: ACTIVE | Noted: 2025-01-09

## 2025-01-09 PROBLEM — R53.82 CHRONIC FATIGUE: Status: ACTIVE | Noted: 2025-01-09

## 2025-01-09 PROBLEM — S72.141A: Status: ACTIVE | Noted: 2024-09-17

## 2025-01-09 PROBLEM — S72.002S CLOSED FRACTURE OF LEFT HIP, SEQUELA: Status: ACTIVE | Noted: 2025-01-09

## (undated) DEVICE — MARKING PEN W RULER

## (undated) DEVICE — DRAIN JACKSON PRATT 3 SPRING RESERVOIR W 10FR PVC DRAIN

## (undated) DEVICE — DRAPE TOWEL BLUE 17" X 24"

## (undated) DEVICE — DRAPE MAYO STAND 30"

## (undated) DEVICE — SOL IRR POUR H2O 250ML

## (undated) DEVICE — POSITIONER FOAM EGG CRATE ULNAR 2PCS (PINK)

## (undated) DEVICE — DRSG TAPE MICROFOAM 3"

## (undated) DEVICE — GLV 8 PROTEXIS (WHITE)

## (undated) DEVICE — DRAPE INSTRUMENT POUCH 6.75" X 11"

## (undated) DEVICE — REAMER STRYKER ORTHO SHAFT MOD TRINKLE

## (undated) DEVICE — DRSG STERISTRIPS 0.5 X 4"

## (undated) DEVICE — SPECIMEN CONTAINER 100ML

## (undated) DEVICE — MEDICATION LABELS W MARKER

## (undated) DEVICE — VENODYNE/SCD SLEEVE CALF LARGE

## (undated) DEVICE — WARMING BLANKET UPPER ADULT

## (undated) DEVICE — BLADE SCALPEL SAFETYLOCK #15

## (undated) DEVICE — DRSG TEGADERM 4X4.75"

## (undated) DEVICE — GLV 8.5 PROTEXIS (WHITE)

## (undated) DEVICE — GLV 6.5 PROTEXIS (WHITE)

## (undated) DEVICE — TAPE SILK 3"

## (undated) DEVICE — DRSG ACE BANDAGE 6"

## (undated) DEVICE — SOL IRR POUR NS 0.9% 500ML

## (undated) DEVICE — GOWN TRIMAX LG

## (undated) DEVICE — ELCTR BOVIE PENCIL SMOKE EVACUATION

## (undated) DEVICE — DRILL BIT STRYKER ORTHO 4.2 X 340MM

## (undated) DEVICE — GLV 9 DURAPRENE

## (undated) DEVICE — DRSG WEBRIL 6"

## (undated) DEVICE — SUT POLYSORB 2-0 30" GS-21 UNDYED

## (undated) DEVICE — SYR ASEPTO

## (undated) DEVICE — SYNTHES REAMING ROD WITH BALL TIP 2.5MM 950MM

## (undated) DEVICE — GLV 7.5 PROTEXIS (WHITE)

## (undated) DEVICE — STAPLER SKIN VISI-STAT 35 WIDE

## (undated) DEVICE — DRILL BIT STRYKER ORTHO FREEHAND 4.2X185MM

## (undated) DEVICE — SUT POLYSORB 0 30" GS-21 UNDYED

## (undated) DEVICE — DRSG COBAN 6"

## (undated) DEVICE — POSITIONER FOAM HEADREST (PINK)

## (undated) DEVICE — BLADE SCALPEL SAFETYLOCK #10

## (undated) DEVICE — PACK HIP PINNING

## (undated) DEVICE — GLV 7 PROTEXIS (WHITE)

## (undated) DEVICE — DRAPE 3/4 SHEET W REINFORCEMENT 56X77"

## (undated) DEVICE — SUT POLYSORB 1 36" GS-21 UNDYED

## (undated) DEVICE — VENODYNE/SCD SLEEVE CALF MEDIUM

## (undated) DEVICE — PREP CHLORAPREP HI-LITE ORANGE 26ML

## (undated) DEVICE — DRSG TEGADERM 6"X8"

## (undated) DEVICE — SUCTION YANKAUER NO CONTROL VENT

## (undated) DEVICE — DRAPE SHOWER CURTAIN ISOLATION

## (undated) DEVICE — DRILL BIT STRYKER ORTHO LOKG 4.2X360MM

## (undated) DEVICE — FOLEY TRAY 16FR 5CC LTX UMETER CLOSED

## (undated) DEVICE — WOUND IRR SURGIPHOR